# Patient Record
Sex: MALE | Race: WHITE | NOT HISPANIC OR LATINO | Employment: OTHER | URBAN - METROPOLITAN AREA
[De-identification: names, ages, dates, MRNs, and addresses within clinical notes are randomized per-mention and may not be internally consistent; named-entity substitution may affect disease eponyms.]

---

## 2021-04-14 DIAGNOSIS — Z23 ENCOUNTER FOR IMMUNIZATION: ICD-10-CM

## 2022-11-02 ENCOUNTER — APPOINTMENT (EMERGENCY)
Dept: CT IMAGING | Facility: HOSPITAL | Age: 77
End: 2022-11-02

## 2022-11-02 ENCOUNTER — HOSPITAL ENCOUNTER (EMERGENCY)
Facility: HOSPITAL | Age: 77
End: 2022-11-02
Attending: INTERNAL MEDICINE

## 2022-11-02 ENCOUNTER — APPOINTMENT (EMERGENCY)
Dept: RADIOLOGY | Facility: HOSPITAL | Age: 77
End: 2022-11-02

## 2022-11-02 ENCOUNTER — HOSPITAL ENCOUNTER (INPATIENT)
Facility: HOSPITAL | Age: 77
LOS: 7 days | End: 2022-11-09
Attending: SURGERY | Admitting: SURGERY

## 2022-11-02 VITALS
SYSTOLIC BLOOD PRESSURE: 103 MMHG | TEMPERATURE: 97.5 F | HEART RATE: 80 BPM | HEIGHT: 73 IN | WEIGHT: 288 LBS | BODY MASS INDEX: 38.17 KG/M2 | OXYGEN SATURATION: 98 % | DIASTOLIC BLOOD PRESSURE: 55 MMHG | RESPIRATION RATE: 18 BRPM

## 2022-11-02 DIAGNOSIS — S72.353A: ICD-10-CM
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
## 2022-11-02 DIAGNOSIS — M97.9XXA PERIPROSTHETIC FRACTURE OF KNEE: Primary | ICD-10-CM

## 2022-11-02 DIAGNOSIS — R21 RASH: ICD-10-CM

## 2022-11-02 LAB
ABO GROUP BLD: NORMAL
ANION GAP SERPL CALCULATED.3IONS-SCNC: 7 MMOL/L (ref 4–13)
APTT PPP: 25 SECONDS (ref 23–37)
BASOPHILS # BLD AUTO: 0.03 THOUSANDS/ÂΜL (ref 0–0.1)
BASOPHILS NFR BLD AUTO: 0 % (ref 0–1)
BLD GP AB SCN SERPL QL: NEGATIVE
BLD GP AB SCN SERPL QL: NEGATIVE
BUN SERPL-MCNC: 17 MG/DL (ref 5–25)
CALCIUM SERPL-MCNC: 8.9 MG/DL (ref 8.4–10.2)
CHLORIDE SERPL-SCNC: 106 MMOL/L (ref 96–108)
CO2 SERPL-SCNC: 29 MMOL/L (ref 21–32)
CREAT SERPL-MCNC: 1.05 MG/DL (ref 0.6–1.3)
EOSINOPHIL # BLD AUTO: 0.07 THOUSAND/ÂΜL (ref 0–0.61)
EOSINOPHIL NFR BLD AUTO: 1 % (ref 0–6)
ERYTHROCYTE [DISTWIDTH] IN BLOOD BY AUTOMATED COUNT: 13 % (ref 11.6–15.1)
GFR SERPL CREATININE-BSD FRML MDRD: 69 ML/MIN/1.73SQ M
GLUCOSE SERPL-MCNC: 104 MG/DL (ref 65–140)
HCT VFR BLD AUTO: 37.9 % (ref 36.5–49.3)
HGB BLD-MCNC: 12.3 G/DL (ref 12–17)
IMM GRANULOCYTES # BLD AUTO: 0.04 THOUSAND/UL (ref 0–0.2)
IMM GRANULOCYTES NFR BLD AUTO: 0 % (ref 0–2)
INR PPP: 1.01 (ref 0.84–1.19)
LYMPHOCYTES # BLD AUTO: 0.95 THOUSANDS/ÂΜL (ref 0.6–4.47)
LYMPHOCYTES NFR BLD AUTO: 7 % (ref 14–44)
MCH RBC QN AUTO: 30.2 PG (ref 26.8–34.3)
MCHC RBC AUTO-ENTMCNC: 32.5 G/DL (ref 31.4–37.4)
MCV RBC AUTO: 93 FL (ref 82–98)
MONOCYTES # BLD AUTO: 0.65 THOUSAND/ÂΜL (ref 0.17–1.22)
MONOCYTES NFR BLD AUTO: 5 % (ref 4–12)
NEUTROPHILS # BLD AUTO: 11.46 THOUSANDS/ÂΜL (ref 1.85–7.62)
NEUTS SEG NFR BLD AUTO: 87 % (ref 43–75)
NRBC BLD AUTO-RTO: 0 /100 WBCS
PLATELET # BLD AUTO: 215 THOUSANDS/UL (ref 149–390)
PLATELET # BLD AUTO: 225 THOUSANDS/UL (ref 149–390)
PMV BLD AUTO: 8.9 FL (ref 8.9–12.7)
PMV BLD AUTO: 9 FL (ref 8.9–12.7)
POTASSIUM SERPL-SCNC: 4.1 MMOL/L (ref 3.5–5.3)
PROTHROMBIN TIME: 13.3 SECONDS (ref 11.6–14.5)
RBC # BLD AUTO: 4.07 MILLION/UL (ref 3.88–5.62)
RH BLD: POSITIVE
SODIUM SERPL-SCNC: 142 MMOL/L (ref 135–147)
SPECIMEN EXPIRATION DATE: NORMAL
SPECIMEN EXPIRATION DATE: NORMAL
WBC # BLD AUTO: 13.2 THOUSAND/UL (ref 4.31–10.16)

## 2022-11-02 RX ORDER — ONDANSETRON 2 MG/ML
4 INJECTION INTRAMUSCULAR; INTRAVENOUS EVERY 6 HOURS PRN
Status: DISCONTINUED | OUTPATIENT
Start: 2022-11-02 | End: 2022-11-09 | Stop reason: HOSPADM

## 2022-11-02 RX ORDER — ENOXAPARIN SODIUM 100 MG/ML
30 INJECTION SUBCUTANEOUS EVERY 12 HOURS
Status: DISCONTINUED | OUTPATIENT
Start: 2022-11-02 | End: 2022-11-02

## 2022-11-02 RX ORDER — ATORVASTATIN CALCIUM 40 MG/1
40 TABLET, FILM COATED ORAL
Status: DISCONTINUED | OUTPATIENT
Start: 2022-11-03 | End: 2022-11-09 | Stop reason: HOSPADM

## 2022-11-02 RX ORDER — OXYCODONE HYDROCHLORIDE 5 MG/1
2.5 TABLET ORAL EVERY 4 HOURS PRN
Status: DISCONTINUED | OUTPATIENT
Start: 2022-11-02 | End: 2022-11-09 | Stop reason: HOSPADM

## 2022-11-02 RX ORDER — SODIUM CHLORIDE, SODIUM LACTATE, POTASSIUM CHLORIDE, CALCIUM CHLORIDE 600; 310; 30; 20 MG/100ML; MG/100ML; MG/100ML; MG/100ML
75 INJECTION, SOLUTION INTRAVENOUS CONTINUOUS
Status: DISCONTINUED | OUTPATIENT
Start: 2022-11-02 | End: 2022-11-07

## 2022-11-02 RX ORDER — HYDROMORPHONE HCL/PF 1 MG/ML
0.5 SYRINGE (ML) INJECTION ONCE
Status: COMPLETED | OUTPATIENT
Start: 2022-11-02 | End: 2022-11-02

## 2022-11-02 RX ORDER — FENTANYL CITRATE 50 UG/ML
50 INJECTION, SOLUTION INTRAMUSCULAR; INTRAVENOUS ONCE
Status: COMPLETED | OUTPATIENT
Start: 2022-11-02 | End: 2022-11-02

## 2022-11-02 RX ORDER — DOCUSATE SODIUM 100 MG/1
100 CAPSULE, LIQUID FILLED ORAL 2 TIMES DAILY
Status: DISCONTINUED | OUTPATIENT
Start: 2022-11-02 | End: 2022-11-09 | Stop reason: HOSPADM

## 2022-11-02 RX ORDER — ROPINIROLE 1 MG/1
1 TABLET, FILM COATED ORAL
Status: DISCONTINUED | OUTPATIENT
Start: 2022-11-02 | End: 2022-11-09 | Stop reason: HOSPADM

## 2022-11-02 RX ORDER — ACETAMINOPHEN 325 MG/1
975 TABLET ORAL EVERY 8 HOURS SCHEDULED
Status: DISCONTINUED | OUTPATIENT
Start: 2022-11-02 | End: 2022-11-09 | Stop reason: HOSPADM

## 2022-11-02 RX ORDER — TAMSULOSIN HYDROCHLORIDE 0.4 MG/1
0.4 CAPSULE ORAL
Status: DISCONTINUED | OUTPATIENT
Start: 2022-11-03 | End: 2022-11-09 | Stop reason: HOSPADM

## 2022-11-02 RX ORDER — OXYCODONE HYDROCHLORIDE 5 MG/1
5 TABLET ORAL EVERY 4 HOURS PRN
Status: DISCONTINUED | OUTPATIENT
Start: 2022-11-02 | End: 2022-11-09 | Stop reason: HOSPADM

## 2022-11-02 RX ORDER — ASPIRIN 81 MG/1
81 TABLET ORAL DAILY
Status: DISCONTINUED | OUTPATIENT
Start: 2022-11-03 | End: 2022-11-09 | Stop reason: HOSPADM

## 2022-11-02 RX ORDER — EZETIMIBE 10 MG/1
10 TABLET ORAL
Status: DISCONTINUED | OUTPATIENT
Start: 2022-11-02 | End: 2022-11-09 | Stop reason: HOSPADM

## 2022-11-02 RX ORDER — ENOXAPARIN SODIUM 100 MG/ML
30 INJECTION SUBCUTANEOUS EVERY 12 HOURS SCHEDULED
Status: DISCONTINUED | OUTPATIENT
Start: 2022-11-02 | End: 2022-11-09 | Stop reason: HOSPADM

## 2022-11-02 RX ADMIN — IOHEXOL 100 ML: 350 INJECTION, SOLUTION INTRAVENOUS at 15:19

## 2022-11-02 RX ADMIN — ENOXAPARIN SODIUM 30 MG: 30 INJECTION SUBCUTANEOUS at 23:04

## 2022-11-02 RX ADMIN — HYDROMORPHONE HYDROCHLORIDE 0.5 MG: 1 INJECTION, SOLUTION INTRAMUSCULAR; INTRAVENOUS; SUBCUTANEOUS at 20:12

## 2022-11-02 RX ADMIN — EZETIMIBE 10 MG: 10 TABLET ORAL at 23:05

## 2022-11-02 RX ADMIN — HYDROMORPHONE HYDROCHLORIDE 0.5 MG: 1 INJECTION, SOLUTION INTRAMUSCULAR; INTRAVENOUS; SUBCUTANEOUS at 18:10

## 2022-11-02 RX ADMIN — FENTANYL CITRATE 50 MCG: 50 INJECTION, SOLUTION INTRAMUSCULAR; INTRAVENOUS at 14:41

## 2022-11-02 RX ADMIN — ACETAMINOPHEN 975 MG: 325 TABLET ORAL at 23:05

## 2022-11-02 RX ADMIN — Medication 12.5 MG: at 23:05

## 2022-11-02 RX ADMIN — ROPINIROLE HYDROCHLORIDE 1 MG: 1 TABLET, FILM COATED ORAL at 23:05

## 2022-11-02 RX ADMIN — FENTANYL CITRATE 50 MCG: 50 INJECTION, SOLUTION INTRAMUSCULAR; INTRAVENOUS at 16:32

## 2022-11-02 RX ADMIN — SODIUM CHLORIDE, SODIUM LACTATE, POTASSIUM CHLORIDE, AND CALCIUM CHLORIDE 75 ML/HR: .6; .31; .03; .02 INJECTION, SOLUTION INTRAVENOUS at 22:59

## 2022-11-02 NOTE — ED NOTES
Pt transported to [facility]0 [facility] lucian Carcamo RN  11/02/22 5213

## 2022-11-02 NOTE — EMTALA/ACUTE CARE TRANSFER
[address] [place] [id number]  Dept: [phone]      EMTALA TRANSFER CONSENT    NAME Ronan Fallon                                         1945                              MRN [id number]    I have been informed of my rights regarding examination, treatment, and transfer   by Dr Azael Krause DO    Benefits: Specialized equipment and/or services available at the receiving facility (Include comment)________________________ Asiya Gallegos)    Risks: Potential for delay in receiving treatment, Potential deterioration of medical condition, Loss of IV, Increased discomfort during transfer, Possible worsening of condition or death during transfer      Consent for Transfer:  I acknowledge that my medical condition has been evaluated and explained to me by the emergency department physician or other qualified medical person and/or my attending physician, who has recommended that I be transferred to the service of  Accepting Physician: Dr Andie Gan at [facility] Name, [address] : [facility]  The above potential benefits of such transfer, the potential risks associated with such transfer, and the probable risks of not being transferred have been explained to me, and I fully understand them  The doctor has explained that, in my case, the benefits of transfer outweigh the risks  I agree to be transferred  I authorize the performance of emergency medical procedures and treatments upon me in both transit and upon arrival at the receiving facility  Additionally, I authorize the release of any and all medical records to the receiving facility and request they be transported with me, if possible  I understand that the safest mode of transportation during a medical emergency is an ambulance and that the Hospital advocates the use of this mode of transport   Risks of traveling to the receiving facility by car, including absence of medical control, life sustaining equipment, such as oxygen, and medical personnel has been explained to me and I fully understand them  (RANDI CORRECT BOX BELOW)  [  ]  I consent to the stated transfer and to be transported by ambulance/helicopter  [  ]  I consent to the stated transfer, but refuse transportation by ambulance and accept full responsibility for my transportation by car  I understand the risks of non-ambulance transfers and I exonerate the Hospital and its staff from any deterioration in my condition that results from this refusal     X___________________________________________    DATE  22  TIME________  Signature of patient or legally responsible individual signing on patient behalf           RELATIONSHIP TO PATIENT_________________________          Provider Certification    NAME Fede Ritchie                                         1945                              MRN [id number]    A medical screening exam was performed on the above named patient  Based on the examination:    Condition Necessitating Transfer The primary encounter diagnosis was Fall, initial encounter  A diagnosis of Comminuted fracture of shaft of femur ([address] ) was also pertinent to this visit      Patient Condition: The patient has been stabilized such that within reasonable medical probability, no material deterioration of the patient condition or the condition of the unborn child(nirali) is likely to result from the transfer    Reason for Transfer: Level of Care needed not available at this facility    Transfer Requirements: Facility L[facility] of hospitals in the [place]   · Space available and qualified personnel available for treatment as acknowledged by    · Agreed to accept transfer and to provide appropriate medical treatment as acknowledged by       Dr Levorn Cooks  · Appropriate medical records of the examination and treatment of the patient are provided at the time of transfer   [address] _______  · Transfer will be performed by qualified personnel from    and appropriate transfer equipment as required, including the use of necessary and appropriate life support measures  Provider Certification: I have examined the patient and explained the following risks and benefits of being transferred/refusing transfer to the patient/family:  General risk, such as traffic hazards, adverse weather conditions, rough terrain or turbulence, possible failure of equipment (including vehicle or aircraft), or consequences of actions of persons outside the control of the transport personnel      Based on these reasonable risks and benefits to the patient and/or the unborn child(nirali), and based upon the information available at the time of the patient’s examination, I certify that the medical benefits reasonably to be expected from the provision of appropriate medical treatments at another medical facility outweigh the increasing risks, if any, to the individual’s medical condition, and in the case of labor to the unborn child, from effecting the transfer      X____________________________________________ DATE 11/02/22        TIME_______      ORIGINAL - SEND TO MEDICAL RECORDS   COPY - SEND WITH PATIENT DURING TRANSFER

## 2022-11-02 NOTE — ED PROVIDER NOTES
Emergency Department Trauma Note  Mary Youngblood 77 y o  male MRN: [id number]  Unit/Bed#: ED 12/ED 12 Encounter: [id number]      Trauma Alert: Trauma Acuity: Trauma Evaluation  Model of Arrival: Mode of Arrival: ALS via    Trauma Team: Current Providers  Attending Provider: Kristine Taylor DO  Physician Assistant: David Gale PA-C  Registered Nurse: Timothy Seth RN  Consultants:     None      History of Present Illness     Chief Complaint:   Chief Complaint   Patient presents with   • Fall     Patient was fishing on an embankment and fell down it injuring his right leg        HPI:  aMry Youngblood is a 77 y o  male who presents with R leg pain and deformity after fall  Mechanism:Details of Incident: pt was fishing and fell down an embankment backwards  Injury Date: 11/02/22 Injury Time: 1330 Injury [address]: carbon    78-year-old male presents to the emergency department via EMS with concern for a fall down a [place]  Patient was reportedly a fishing when he slipped and fell  Patient is reported to have had an obvious deformity of the right lower extremity  Prior history of knee replacement  Patient states he feels grinding in the leg  Concern for fracture in the area of the patient's knee  No gross deformity with splinting applied by EMS  Patient also complains of neck pain chest pain and back pain  No specific abdominal pain  No gross evidence of trauma on the patient other than splinting on right leg  He is awake alert oriented  No reported loss consciousness  He does take daily baby aspirin  Patient states that it strike his head during the fall  Allergies reviewed        Review of Systems   HENT: Negative for dental problem, facial swelling, hearing loss and tinnitus  Eyes: Negative for pain and visual disturbance  Respiratory: Positive for chest tightness  Negative for shortness of breath  Cardiovascular: Negative for chest pain  Gastrointestinal: Negative for abdominal pain  Musculoskeletal: Positive for arthralgias, joint swelling, neck pain and neck stiffness  Negative for back pain  Neurological: Negative for dizziness and headaches  All other systems reviewed and are negative  Historical Information     Immunizations: There is no immunization history on file for this patient  No past medical history on file  No family history on file  No past surgical history on file  No existing history information found  No existing history information found  Family History: non-contributory    Meds/Allergies   None       Not on File    PHYSICAL EXAM    PE limited by: none    Objective   Vitals:   First set: Temperature: 97 5 °F (36 4 °C) (11/02/22 1420)  Pulse: 103 (11/02/22 1420)  Respirations: 18 (11/02/22 1420)  Blood Pressure: 98/56 (11/02/22 1420)  SpO2: 94 % (11/02/22 1420)    Primary Survey:   (A) Airway: patent  (B) Breathing: cta b/l  (C) Circulation: Pulses:   normal  (D) Disabliity:  GCS Total:  15  (E) Expose:  Completed    Secondary Survey: (Click on Physical Exam tab above)  Physical Exam  Vitals and nursing note reviewed  Constitutional:       General: He is not in acute distress  Appearance: He is well-developed  He is not ill-appearing  HENT:      Head: Normocephalic and atraumatic  Right Ear: External ear normal       Left Ear: External ear normal       Nose: Nose normal    Eyes:      Pupils: Pupils are equal, round, and reactive to light  Neck:      Comments: Unable to clear with nexus criteria  Cardiovascular:      Rate and Rhythm: Normal rate and regular rhythm  Heart sounds: Normal heart sounds  No murmur heard  No friction rub  No gallop  Pulmonary:      Effort: Pulmonary effort is normal  No respiratory distress  Breath sounds: Normal breath sounds  No stridor  No wheezing or rales  Chest:      Comments: Nonspecific generalized chest tenderness    Abdominal: General: Bowel sounds are normal  There is no distension  Palpations: Abdomen is soft  Tenderness: There is no abdominal tenderness  There is no guarding  Comments: Fast negative   Musculoskeletal:         General: No tenderness  Normal range of motion  Cervical back: Neck supple  Comments: Deformity and swelling to the area the patient's right knee  Tenderness to left knee  No deformity   Skin:     General: Skin is warm  Capillary Refill: Capillary refill takes less than 2 seconds  Neurological:      Mental Status: He is alert and oriented to person, place, and time  Psychiatric:         Behavior: Behavior is cooperative  Cervical spine cleared by clinical criteria?  No (imaging required)      Invasive Devices  Report    Peripheral Intravenous Line  Duration           Peripheral IV 11/02/22 Distal;Right;Upper;Ventral (anterior) Arm <1 day                Lab Results:   Results Reviewed     Procedure Component Value Units Date/Time    Basic metabolic panel [[id number]] Collected: 11/02/22 1444    Lab Status: Final result Specimen: Blood from Arm, Right Updated: 11/02/22 1510     Sodium 142 mmol/L      Potassium 4 1 mmol/L      Chloride 106 mmol/L      CO2 29 mmol/L      ANION GAP 7 mmol/L      BUN 17 mg/dL      Creatinine 1 05 mg/dL      Glucose 104 mg/dL      Calcium 8 9 mg/dL      eGFR 69 ml/min/1 73sq m     Narrative:      [place] guidelines for Chronic Kidney Disease (CKD):   •  Stage 1 with normal or high GFR (GFR > 90 mL/min/1 73 square meters)  •  Stage 2 Mild CKD (GFR = 60-89 mL/min/1 73 square meters)  •  Stage 3A Moderate CKD (GFR = 45-59 mL/min/1 73 square meters)  •  Stage 3B Moderate CKD (GFR = 30-44 mL/min/1 73 square meters)  •  Stage 4 Severe CKD (GFR = 15-29 mL/min/1 73 square meters)  •  Stage 5 End Stage CKD (GFR <15 mL/min/1 73 square meters)  Note: GFR calculation is accurate only with a steady state creatinine    Protime-INR [[id number]]  (Normal) Collected: 11/02/22 1444    Lab Status: Final result Specimen: Blood from Arm, Right Updated: 11/02/22 1501     Protime 13 3 seconds      INR 1 01    APTT [[id number]]  (Normal) Collected: 11/02/22 1444    Lab Status: Final result Specimen: Blood from Arm, Right Updated: 11/02/22 1501     PTT 25 seconds     CBC and differential [[id number]]  (Abnormal) Collected: 11/02/22 1444    Lab Status: Final result Specimen: Blood from Arm, Right Updated: 11/02/22 1449     WBC 13 20 Thousand/uL      RBC 4 07 Million/uL      Hemoglobin 12 3 g/dL      Hematocrit 37 9 %      MCV 93 fL      MCH 30 2 pg      MCHC 32 5 g/dL      RDW 13 0 %      MPV 9 0 fL      Platelets 601 Thousands/uL      nRBC 0 /100 WBCs      Neutrophils Relative 87 %      Immat GRANS % 0 %      Lymphocytes Relative 7 %      Monocytes Relative 5 %      Eosinophils Relative 1 %      Basophils Relative 0 %      Neutrophils Absolute 11 46 Thousands/µL      Immature Grans Absolute 0 04 Thousand/uL      Lymphocytes Absolute 0 95 Thousands/µL      Monocytes Absolute 0 65 Thousand/µL      Eosinophils Absolute 0 07 Thousand/µL      Basophils Absolute 0 03 Thousands/µL                  Imaging Studies:   Direct to CT: No  CT lower extremity wo contrast right   Final Result by Candelario Samuel MD (11/02 1630)      Acute comminuted distal femoral periprosthetic fracture, as described  Workstation performed: [id number]         XR knee 4+ vw right injury   Final Result by Candelario Samuel MD (11/02 1636)      Acute comminuted distal femoral periprosthetic fracture  No acute osseous abnormality in the proximal femur, tibia-fibula, or ankle  Workstation performed: [id number]         XR femur 2 views RIGHT   Final Result by Candelario Samuel MD (11/02 1636)      Acute comminuted distal femoral periprosthetic fracture  No acute osseous abnormality in the proximal femur, tibia-fibula, or ankle        Workstation performed: [id number] XR tibia fibula 2 views RIGHT   Final Result by Rey Covington MD (11/02 1636)      Acute comminuted distal femoral periprosthetic fracture  No acute osseous abnormality in the proximal femur, tibia-fibula, or ankle  Workstation performed: [id number]         XR ankle 3+ views RIGHT   Final Result by Rey Covington MD (11/02 1636)      Acute comminuted distal femoral periprosthetic fracture  No acute osseous abnormality in the proximal femur, tibia-fibula, or ankle  Workstation performed: [id number]         TRAUMA - CT chest abdomen pelvis w contrast   Final Result by Rey Covington MD (11/02 1556)      No acute traumatic injury in the chest, abdomen, or pelvis  The study was marked in [facility] for immediate notification  Workstation performed: [id number]         TRAUMA - CT spine cervical wo contrast   Final Result by Rey Covington MD (11/02 1538)      No cervical spine fracture or traumatic malalignment  The study was marked in [facility] for immediate notification  Workstation performed: [id number]         TRAUMA - CT head wo contrast   Final Result by Rey Covington MD (11/02 1528)      No acute intracranial abnormality  The study was marked in [facility] for immediate notification  Workstation performed: [id number]         XR Trauma chest portable   Final Result by London Amato MD (11/02 1651)      No acute cardiopulmonary disease  Workstation performed: [id number]         XR Trauma pelvis ap only 1 or 2 vw   Final Result by London Amato MD (11/02 1652)      No acute osseous abnormality  Degenerative changes as described  Workstation performed: [id number]               Procedures  Procedures         ED Course  ED Course as of 11/02/22 1929 Wed Nov 02, 2022   1629 Patient with complicated distal femur periprosthetic fracture  Requesting transfer to [place]     12 Pt willing to stay in network to fix fracture  1732 Patient to be transferred to [facility] for management complex femur fracture  MDM  Number of Diagnoses or Management Options  Comminuted fracture of shaft of femur ([facility] [address] ): new and requires workup  Fall, initial encounter: new and requires workup  Diagnosis management comments: Patient has a periprosthetic right femoral fracture adjacent to the patient's prior right knee arthroplasty  Patient to require transfer to higher level of care for further management  Patient is agreeable this plan  Amount and/or Complexity of Data Reviewed  Clinical lab tests: ordered and reviewed  Tests in the radiology section of CPT®: ordered and reviewed    Risk of Complications, Morbidity, and/or Mortality  Presenting problems: moderate  Diagnostic procedures: low  Management options: low    Patient Progress  Patient progress: stable          Disposition  [facility] Transfer: No  Final diagnoses:   Fall, initial encounter   Comminuted fracture of shaft of femur ([facility] [address] )     Time reflects when diagnosis was documented in both MDM as applicable and the Disposition within this note     Time User Action Codes Description Comment    11/2/2022  5:33 PM Elvie Ambriz Add [Q52  FUYL] Fall, initial encounter     11/2/2022  5:33 PM Elvie Ambriz Add [S72 353A] Comminuted fracture of shaft of femur [facility])       ED Disposition     ED Disposition   Transfer to Another Facility-In Network    Condition   --    Date/Time   Wed Nov 2, 2022  5:33 PM    Comment   Fede  should be transferred out to B             MD Documentation    Dennis Wharton Most Recent Value   Patient Condition The patient has been stabilized such that within reasonable medical probability, no material deterioration of the patient condition or the condition of the unborn child(nirali) is likely to result from the transfer   Reason for Transfer Level of Care needed not available at this facility   Benefits of Transfer Specialized equipment and/or services available at the receiving facility (Include comment)________________________  [Trauma/Ortho]   Risks of Transfer Potential for delay in receiving treatment, Potential deterioration of medical condition, Loss of IV, Increased discomfort during transfer, Possible worsening of condition or death during transfer   Accepting Physician Dr Yeni Ayala Name, Ashli Castillo PA-C   Provider Certification General risk, such as traffic hazards, adverse weather conditions, rough terrain or turbulence, possible failure of equipment (including vehicle or aircraft), or consequences of actions of persons outside the control of the transport personnel      RN Documentation    [address] Name, [address]  [facility]   Transport Mode Ambulance      Follow-up Information    None       Patient's Medications    No medications on file     No discharge procedures on file      PDMP Review     None          ED Provider  Electronically Signed by         Citlali Gonzales PA-C  11/02/22 7044

## 2022-11-03 ENCOUNTER — APPOINTMENT (INPATIENT)
Dept: RADIOLOGY | Facility: HOSPITAL | Age: 77
End: 2022-11-03

## 2022-11-03 ENCOUNTER — ANESTHESIA (INPATIENT)
Dept: PERIOP | Facility: HOSPITAL | Age: 77
End: 2022-11-03

## 2022-11-03 ENCOUNTER — ANESTHESIA EVENT (INPATIENT)
Dept: PERIOP | Facility: HOSPITAL | Age: 77
End: 2022-11-03

## 2022-11-03 LAB
ATRIAL RATE: 101 BPM
ATRIAL RATE: 80 BPM
BASOPHILS # BLD MANUAL: 0 THOUSAND/UL (ref 0–0.1)
BASOPHILS NFR MAR MANUAL: 0 % (ref 0–1)
EOSINOPHIL # BLD MANUAL: 0 THOUSAND/UL (ref 0–0.4)
EOSINOPHIL NFR BLD MANUAL: 0 % (ref 0–6)
ERYTHROCYTE [DISTWIDTH] IN BLOOD BY AUTOMATED COUNT: 13.2 % (ref 11.6–15.1)
GIANT PLATELETS BLD QL SMEAR: PRESENT
HCT VFR BLD AUTO: 28.4 % (ref 36.5–49.3)
HGB BLD-MCNC: 9.1 G/DL (ref 12–17)
LYMPHOCYTES # BLD AUTO: 0.08 THOUSAND/UL (ref 0.6–4.47)
LYMPHOCYTES # BLD AUTO: 1 % (ref 14–44)
MCH RBC QN AUTO: 30.1 PG (ref 26.8–34.3)
MCHC RBC AUTO-ENTMCNC: 32 G/DL (ref 31.4–37.4)
MCV RBC AUTO: 94 FL (ref 82–98)
MONOCYTES # BLD AUTO: 0.08 THOUSAND/UL (ref 0–1.22)
MONOCYTES NFR BLD: 1 % (ref 4–12)
NEUTROPHILS # BLD MANUAL: 8.26 THOUSAND/UL (ref 1.85–7.62)
NEUTS BAND NFR BLD MANUAL: 7 % (ref 0–8)
NEUTS SEG NFR BLD AUTO: 91 % (ref 43–75)
P AXIS: 37 DEGREES
P AXIS: 43 DEGREES
PLATELET # BLD AUTO: 164 THOUSANDS/UL (ref 149–390)
PLATELET BLD QL SMEAR: ADEQUATE
PMV BLD AUTO: 9 FL (ref 8.9–12.7)
POLYCHROMASIA BLD QL SMEAR: PRESENT
PR INTERVAL: 164 MS
PR INTERVAL: 166 MS
QRS AXIS: 45 DEGREES
QRS AXIS: 56 DEGREES
QRSD INTERVAL: 92 MS
QRSD INTERVAL: 92 MS
QT INTERVAL: 348 MS
QT INTERVAL: 386 MS
QTC INTERVAL: 445 MS
QTC INTERVAL: 451 MS
RBC # BLD AUTO: 3.02 MILLION/UL (ref 3.88–5.62)
RBC MORPH BLD: PRESENT
T WAVE AXIS: 73 DEGREES
T WAVE AXIS: 74 DEGREES
VENTRICULAR RATE: 101 BPM
VENTRICULAR RATE: 80 BPM
WBC # BLD AUTO: 8.43 THOUSAND/UL (ref 4.31–10.16)

## 2022-11-03 PROCEDURE — 0QSB04Z REPOSITION RIGHT LOWER FEMUR WITH INTERNAL FIXATION DEVICE, OPEN APPROACH: ICD-10-PCS | Performed by: ORTHOPAEDIC SURGERY

## 2022-11-03 DEVICE — 5.0MM CANNULATED CONICAL SCREW 95MM: Type: IMPLANTABLE DEVICE | Site: KNEE | Status: FUNCTIONAL

## 2022-11-03 DEVICE — 5.0MM CANNULATED LOCKING SCREW 35MM: Type: IMPLANTABLE DEVICE | Site: KNEE | Status: FUNCTIONAL

## 2022-11-03 DEVICE — 4.5MM CORTEX SCREW SELF-TAPPING 48MM: Type: IMPLANTABLE DEVICE | Site: KNEE | Status: FUNCTIONAL

## 2022-11-03 DEVICE — 4.5MM CORTEX SCREW SELF-TAPPING 60MM: Type: IMPLANTABLE DEVICE | Site: KNEE | Status: FUNCTIONAL

## 2022-11-03 DEVICE — 4.5MM VA-LCP CURVED CONDYLAR PLATE/10 HOLE/230MM/RIGHT
Type: IMPLANTABLE DEVICE | Site: KNEE | Status: FUNCTIONAL
Brand: VA-LCP

## 2022-11-03 DEVICE — 5.0MM CANNULATED LOCKING SCREW 95MM: Type: IMPLANTABLE DEVICE | Site: KNEE | Status: FUNCTIONAL

## 2022-11-03 DEVICE — 5.0MM CANNULATED LOCKING SCREW 80MM: Type: IMPLANTABLE DEVICE | Site: KNEE | Status: FUNCTIONAL

## 2022-11-03 DEVICE — 4.5MM CORTEX SCREW SELF-TAPPING 80MM: Type: IMPLANTABLE DEVICE | Site: KNEE | Status: FUNCTIONAL

## 2022-11-03 RX ORDER — PROPOFOL 10 MG/ML
INJECTION, EMULSION INTRAVENOUS AS NEEDED
Status: DISCONTINUED | OUTPATIENT
Start: 2022-11-03 | End: 2022-11-03

## 2022-11-03 RX ORDER — DEXAMETHASONE SODIUM PHOSPHATE 4 MG/ML
INJECTION, SOLUTION INTRA-ARTICULAR; INTRALESIONAL; INTRAMUSCULAR; INTRAVENOUS; SOFT TISSUE AS NEEDED
Status: DISCONTINUED | OUTPATIENT
Start: 2022-11-03 | End: 2022-11-03

## 2022-11-03 RX ORDER — EPHEDRINE SULFATE 50 MG/ML
INJECTION INTRAVENOUS AS NEEDED
Status: DISCONTINUED | OUTPATIENT
Start: 2022-11-03 | End: 2022-11-03

## 2022-11-03 RX ORDER — ONDANSETRON 2 MG/ML
INJECTION INTRAMUSCULAR; INTRAVENOUS AS NEEDED
Status: DISCONTINUED | OUTPATIENT
Start: 2022-11-03 | End: 2022-11-03

## 2022-11-03 RX ORDER — FENTANYL CITRATE 50 UG/ML
INJECTION, SOLUTION INTRAMUSCULAR; INTRAVENOUS AS NEEDED
Status: DISCONTINUED | OUTPATIENT
Start: 2022-11-03 | End: 2022-11-03

## 2022-11-03 RX ORDER — DEXAMETHASONE SODIUM PHOSPHATE 10 MG/ML
INJECTION, SOLUTION INTRAMUSCULAR; INTRAVENOUS AS NEEDED
Status: DISCONTINUED | OUTPATIENT
Start: 2022-11-03 | End: 2022-11-03

## 2022-11-03 RX ORDER — ROCURONIUM BROMIDE 10 MG/ML
INJECTION, SOLUTION INTRAVENOUS AS NEEDED
Status: DISCONTINUED | OUTPATIENT
Start: 2022-11-03 | End: 2022-11-03

## 2022-11-03 RX ORDER — ENOXAPARIN SODIUM 100 MG/ML
40 INJECTION SUBCUTANEOUS
Qty: 11.2 ML | Refills: 0 | Status: SHIPPED | OUTPATIENT
Start: 2022-11-03 | End: 2022-12-01

## 2022-11-03 RX ORDER — SODIUM CHLORIDE, SODIUM LACTATE, POTASSIUM CHLORIDE, CALCIUM CHLORIDE 600; 310; 30; 20 MG/100ML; MG/100ML; MG/100ML; MG/100ML
INJECTION, SOLUTION INTRAVENOUS CONTINUOUS PRN
Status: DISCONTINUED | OUTPATIENT
Start: 2022-11-03 | End: 2022-11-03

## 2022-11-03 RX ORDER — LIDOCAINE HYDROCHLORIDE 10 MG/ML
INJECTION, SOLUTION EPIDURAL; INFILTRATION; INTRACAUDAL; PERINEURAL AS NEEDED
Status: DISCONTINUED | OUTPATIENT
Start: 2022-11-03 | End: 2022-11-03

## 2022-11-03 RX ORDER — CEFAZOLIN SODIUM 2 G/50ML
2000 SOLUTION INTRAVENOUS
Status: COMPLETED | OUTPATIENT
Start: 2022-11-03 | End: 2022-11-03

## 2022-11-03 RX ORDER — BUPIVACAINE HYDROCHLORIDE 5 MG/ML
INJECTION, SOLUTION EPIDURAL; INTRACAUDAL AS NEEDED
Status: DISCONTINUED | OUTPATIENT
Start: 2022-11-03 | End: 2022-11-03

## 2022-11-03 RX ORDER — ONDANSETRON 2 MG/ML
4 INJECTION INTRAMUSCULAR; INTRAVENOUS ONCE AS NEEDED
Status: DISCONTINUED | OUTPATIENT
Start: 2022-11-03 | End: 2022-11-03 | Stop reason: HOSPADM

## 2022-11-03 RX ORDER — OXYCODONE HYDROCHLORIDE 5 MG/1
TABLET ORAL
Qty: 30 TABLET | Refills: 0 | Status: SHIPPED | OUTPATIENT
Start: 2022-11-03 | End: 2022-11-08

## 2022-11-03 RX ORDER — CEFAZOLIN SODIUM 2 G/50ML
2000 SOLUTION INTRAVENOUS EVERY 8 HOURS
Status: COMPLETED | OUTPATIENT
Start: 2022-11-03 | End: 2022-11-04

## 2022-11-03 RX ORDER — TRANEXAMIC ACID 100 MG/ML
INJECTION, SOLUTION INTRAVENOUS AS NEEDED
Status: DISCONTINUED | OUTPATIENT
Start: 2022-11-03 | End: 2022-11-03

## 2022-11-03 RX ORDER — SUCCINYLCHOLINE/SOD CL,ISO/PF 100 MG/5ML
SYRINGE (ML) INTRAVENOUS AS NEEDED
Status: DISCONTINUED | OUTPATIENT
Start: 2022-11-03 | End: 2022-11-03

## 2022-11-03 RX ORDER — MAGNESIUM HYDROXIDE 1200 MG/15ML
LIQUID ORAL AS NEEDED
Status: DISCONTINUED | OUTPATIENT
Start: 2022-11-03 | End: 2022-11-03 | Stop reason: HOSPADM

## 2022-11-03 RX ORDER — HYDROMORPHONE HCL/PF 1 MG/ML
0.5 SYRINGE (ML) INJECTION
Status: DISCONTINUED | OUTPATIENT
Start: 2022-11-03 | End: 2022-11-03 | Stop reason: HOSPADM

## 2022-11-03 RX ORDER — SODIUM CHLORIDE 9 MG/ML
INJECTION, SOLUTION INTRAVENOUS CONTINUOUS PRN
Status: DISCONTINUED | OUTPATIENT
Start: 2022-11-03 | End: 2022-11-03

## 2022-11-03 RX ADMIN — Medication 1 TABLET: at 08:54

## 2022-11-03 RX ADMIN — ENOXAPARIN SODIUM 30 MG: 30 INJECTION SUBCUTANEOUS at 21:43

## 2022-11-03 RX ADMIN — DEXAMETHASONE SODIUM PHOSPHATE 10 MG: 10 INJECTION, SOLUTION INTRAMUSCULAR; INTRAVENOUS at 13:25

## 2022-11-03 RX ADMIN — Medication 100 MG: at 12:04

## 2022-11-03 RX ADMIN — CEFAZOLIN SODIUM 2000 MG: 2 SOLUTION INTRAVENOUS at 12:01

## 2022-11-03 RX ADMIN — HYDROMORPHONE HYDROCHLORIDE 0.5 MG: 1 INJECTION, SOLUTION INTRAMUSCULAR; INTRAVENOUS; SUBCUTANEOUS at 15:09

## 2022-11-03 RX ADMIN — SODIUM CHLORIDE, SODIUM LACTATE, POTASSIUM CHLORIDE, AND CALCIUM CHLORIDE: .6; .31; .03; .02 INJECTION, SOLUTION INTRAVENOUS at 11:59

## 2022-11-03 RX ADMIN — ACETAMINOPHEN 975 MG: 325 TABLET ORAL at 21:44

## 2022-11-03 RX ADMIN — ONDANSETRON 4 MG: 2 INJECTION INTRAMUSCULAR; INTRAVENOUS at 13:25

## 2022-11-03 RX ADMIN — ROCURONIUM BROMIDE 40 MG: 50 INJECTION INTRAVENOUS at 12:14

## 2022-11-03 RX ADMIN — SODIUM CHLORIDE: 9 INJECTION, SOLUTION INTRAVENOUS at 12:13

## 2022-11-03 RX ADMIN — ATORVASTATIN CALCIUM 40 MG: 40 TABLET, FILM COATED ORAL at 17:09

## 2022-11-03 RX ADMIN — ROCURONIUM BROMIDE 10 MG: 50 INJECTION INTRAVENOUS at 12:04

## 2022-11-03 RX ADMIN — TAMSULOSIN HYDROCHLORIDE 0.4 MG: 0.4 CAPSULE ORAL at 17:09

## 2022-11-03 RX ADMIN — EZETIMIBE 10 MG: 10 TABLET ORAL at 21:44

## 2022-11-03 RX ADMIN — SODIUM CHLORIDE, SODIUM LACTATE, POTASSIUM CHLORIDE, AND CALCIUM CHLORIDE 75 ML/HR: .6; .31; .03; .02 INJECTION, SOLUTION INTRAVENOUS at 15:15

## 2022-11-03 RX ADMIN — DOCUSATE SODIUM 100 MG: 100 CAPSULE, LIQUID FILLED ORAL at 08:55

## 2022-11-03 RX ADMIN — LIDOCAINE HYDROCHLORIDE 50 MG: 10 INJECTION, SOLUTION EPIDURAL; INFILTRATION; INTRACAUDAL; PERINEURAL at 12:04

## 2022-11-03 RX ADMIN — ROPINIROLE HYDROCHLORIDE 1 MG: 1 TABLET, FILM COATED ORAL at 21:44

## 2022-11-03 RX ADMIN — PROPOFOL 200 MG: 10 INJECTION, EMULSION INTRAVENOUS at 12:04

## 2022-11-03 RX ADMIN — BUPIVACAINE HYDROCHLORIDE 30 ML: 5 INJECTION, SOLUTION EPIDURAL; INTRACAUDAL; PERINEURAL at 11:40

## 2022-11-03 RX ADMIN — DEXAMETHASONE SODIUM PHOSPHATE 4 MG: 4 INJECTION INTRA-ARTICULAR; INTRALESIONAL; INTRAMUSCULAR; INTRAVENOUS; SOFT TISSUE at 11:40

## 2022-11-03 RX ADMIN — EPHEDRINE SULFATE 10 MG: 50 INJECTION INTRAVENOUS at 12:12

## 2022-11-03 RX ADMIN — HYDROMORPHONE HYDROCHLORIDE 0.5 MG: 1 INJECTION, SOLUTION INTRAMUSCULAR; INTRAVENOUS; SUBCUTANEOUS at 14:34

## 2022-11-03 RX ADMIN — OXYCODONE HYDROCHLORIDE 5 MG: 5 TABLET ORAL at 09:04

## 2022-11-03 RX ADMIN — FENTANYL CITRATE 50 MCG: 50 INJECTION INTRAMUSCULAR; INTRAVENOUS at 11:54

## 2022-11-03 RX ADMIN — ACETAMINOPHEN 975 MG: 325 TABLET ORAL at 05:07

## 2022-11-03 RX ADMIN — HYDROMORPHONE HYDROCHLORIDE 0.5 MG: 1 INJECTION, SOLUTION INTRAMUSCULAR; INTRAVENOUS; SUBCUTANEOUS at 14:19

## 2022-11-03 RX ADMIN — PHENYLEPHRINE HYDROCHLORIDE 50 MCG/MIN: 10 INJECTION INTRAVENOUS at 12:36

## 2022-11-03 RX ADMIN — FENTANYL CITRATE 50 MCG: 50 INJECTION INTRAMUSCULAR; INTRAVENOUS at 12:04

## 2022-11-03 RX ADMIN — SUGAMMADEX 200 MG: 100 INJECTION, SOLUTION INTRAVENOUS at 13:13

## 2022-11-03 RX ADMIN — TRANEXAMIC ACID 1000 G: 100 INJECTION, SOLUTION INTRAVENOUS at 12:15

## 2022-11-03 RX ADMIN — EPHEDRINE SULFATE 10 MG: 50 INJECTION INTRAVENOUS at 12:24

## 2022-11-03 RX ADMIN — CEFAZOLIN SODIUM 2000 MG: 2 SOLUTION INTRAVENOUS at 21:43

## 2022-11-03 RX ADMIN — DOCUSATE SODIUM 100 MG: 100 CAPSULE, LIQUID FILLED ORAL at 17:09

## 2022-11-03 NOTE — OCCUPATIONAL THERAPY NOTE
Occupational Therapy         Patient Name: Hasmukh Barger  KSHHP'E Date: 11/3/2022         11/03/22 1100   OT Last Visit   OT Visit Date 11/03/22   Note Type   Note type Evaluation; Cancelled Session   Cancel Reasons Patient to operating room     [facility]

## 2022-11-03 NOTE — ANESTHESIA PREPROCEDURE EVALUATION
Procedure:  OPEN REDUCTION W/ INTERNAL FIXATION (ORIF) PERIPROSTHETIC KNEE (Right Knee)    Relevant Problems   No relevant active problems   CABGx2  Years ago, doing well per patient from cardiac standpoint   Obese    History of carotid endarterectomy; Physical Exam    Airway    Mallampati score: II  TM Distance: >3 FB  Neck ROM: full     Dental       Cardiovascular      Pulmonary      Other Findings        Anesthesia Plan  ASA Score- 2     Anesthesia Type- general with ASA Monitors  Additional Monitors:   Airway Plan: ETT  Comment: Femoral Block for post op pain per surgeon request          Plan Factors-    Chart reviewed  Induction- intravenous  Postoperative Plan-     Informed Consent- Anesthetic plan and risks discussed with patient  I personally reviewed this patient with the CRNA  Discussed and agreed on the Anesthesia Plan with the CRNA  Karo Leyva

## 2022-11-03 NOTE — ASSESSMENT & PLAN NOTE
- Consulted Orthopedics:   - Non weight bearing right lower extremity in knee immobilizer   - To OR for ORIF of right distal femur fracture vs revision arthroplasty  - Pain control PRN  - NPO for OR

## 2022-11-03 NOTE — TELEPHONE ENCOUNTER
Caller: Patient's wife Debbi     Doctor: Dagoberto Gibson    Reason for call: Patient's wife calling to speak to the Dr about the surgery (R distal femur periprosthetic fracture) , and the care for the patient after he is released  She is in the hospital now and is looking for a call back to review his surgery   Thank you    Call back#: [phone]

## 2022-11-03 NOTE — ASSESSMENT & PLAN NOTE
Consult orthopaedics - appreciate recs  Admission to trauma service  Pain control   NPO in case going to OR

## 2022-11-03 NOTE — H&P
[facility]  H&P- Dannie Rod 1945, 77 y o  male MRN: [id number]  Unit/Bed#: ED 32 Encounter: [id number]  Primary Care Provider: No primary care provider on file  Date and time admitted to hospital: 11/2/2022  8:59 PM    Periprosthetic fracture of knee  Assessment & Plan  Consult orthopaedics - appreciate recs  Pain control       Trauma Alert: Evaluation; trauma team arrived at 9:10 PM   Model of Arrival: Transfer from [address] [place] [address] Team: Attending Keerthi Ramsey and Residents Marquita Jaquez  Consultants:     Orthopedics: routine consult; Epic consult order placed; History of Present Illness     Chief Complaint: R leg pain  Mechanism:Fall     HPI:    Dannie Rod is a 77 y o  male who presents s/p slip and fall off embankment while fishing with R leg pain  Pain located to R distal femur and knee  +headstrike, -LOC  Trauma workup at [facility] [place] was negative aside from R knee periprosthetic fx  Pt is a community ambulator and baseline, takes ASA 81 mg QD  Is retired  B/l TKA performed by Aern To at [facility], R knee 2 yrs ago, L knee 3 yrs ago  Review of Systems   Constitutional: Negative for fatigue and fever  Respiratory: Negative for cough and shortness of breath  Cardiovascular: Negative for chest pain and palpitations  Gastrointestinal: Negative for abdominal pain  Musculoskeletal: Positive for back pain  Chronic back pain  R Leg pain   Skin: Positive for color change, rash and wound  Neurological: Negative for dizziness, syncope, weakness, light-headedness, numbness and headaches  Psychiatric/Behavioral: Negative for agitation and confusion  The patient is not nervous/anxious  12-point, complete review of systems was reviewed and negative except as stated above  Historical Information     History reviewed  No pertinent past medical history  History reviewed  No pertinent surgical history       Social History     Tobacco Use   • Smoking status: Former Smoker   • Smokeless tobacco: Former User   Substance Use Topics   • Alcohol use: Not Currently       There is no immunization history on file for this patient  Last Tetanus: Unknown  Family History: Non-contributory    1  Before the illness or injury that brought you to the Emergency, did you need someone to help you on a regular basis? 0=No   2  Since the illness or injury that brought you to the Emergency, have you needed more help than usual to take care of yourself? 0=No   3  Have you been hospitalized for one or more nights during the past 6 months (excluding a stay in the Emergency Department)? 0=No   4  In general, do you see well? 0=Yes   5  In general, do you have serious problems with your memory? 0=No   6  Do you take more than three different medications everyday?  1=Yes   TOTAL   1     Did you order a geriatric consult if the score was 2 or greater?: n/a     Meds/Allergies   all current active meds have been reviewed and current meds:   Current Facility-Administered Medications   Medication Dose Route Frequency   • acetaminophen (TYLENOL) tablet 975 mg  975 mg Oral Q8H [address]   • docusate sodium (COLACE) capsule 100 mg  100 mg Oral BID   • enoxaparin (LOVENOX) subcutaneous injection 30 mg  30 mg Subcutaneous Q12H [address]   • lactated ringers infusion  75 mL/hr Intravenous Continuous   • naloxone (NARCAN) 0 04 mg/mL syringe 0 04 mg  0 04 mg Intravenous Q1MIN PRN   • ondansetron (ZOFRAN) injection 4 mg  4 mg Intravenous Q6H PRN   • oxyCODONE (ROXICODONE) IR tablet 2 5 mg  2 5 mg Oral Q4H PRN   • oxyCODONE (ROXICODONE) IR tablet 5 mg  5 mg Oral Q4H PRN      No Known Allergies    Objective   Initial Vitals:   Temperature: 100 °F (37 8 °C) (11/02/22 2108)  Pulse: 95 (11/02/22 2108)  Respirations: 18 (11/02/22 2108)  Blood Pressure: 99/60 (11/02/22 2108)    Primary Survey:   Airway:        Status: patent;        Pre-hospital Interventions: none        Hospital Interventions: none  Breathing:        Pre-hospital Interventions: none       Effort: normal       Right breath sounds: normal       Left breath sounds: normal  Circulation:        Rhythm: regular       Rate: regular   Right Pulses Left Pulses    R radial: 2+                    Disability:        GCS: Eye: 4; Verbal: 5 Motor: 6 Total: 15       Right Pupil: round;  reactive         Left Pupil:  round;  reactive      R Motor Strength L Motor Strength             Sensory:  No sensory deficit  Exposure:       Completed: Yes      Secondary Survey:  Physical Exam  Constitutional:       Appearance: Normal appearance  Eyes:      Pupils: Pupils are equal, round, and reactive to light  Cardiovascular:      Rate and Rhythm: Normal rate and regular rhythm  Pulses: Normal pulses  Pulmonary:      Effort: Pulmonary effort is normal       Breath sounds: Normal breath sounds  Abdominal:      General: Abdomen is flat  Palpations: Abdomen is soft  Musculoskeletal:         General: Swelling and tenderness present  Cervical back: Normal range of motion  Comments: RLE TTP over distal femur and knee  Moderate swelling about R knee  Sensation intact  Able to flex EHL/FHL   Skin:     General: Skin is warm and dry  Neurological:      General: No focal deficit present  Mental Status: He is alert and oriented to person, place, and time  Psychiatric:         Mood and Affect: Mood normal          Behavior: Behavior normal          Invasive Devices  Report    Peripheral Intravenous Line  Duration           Peripheral IV 11/02/22 Distal;Right;Upper;Ventral (anterior) Arm <1 day              Lab Results:   BMP/CMP:   Lab Results   Component Value Date    SODIUM 142 11/02/2022    K 4 1 11/02/2022     11/02/2022    CO2 29 11/02/2022    BUN 17 11/02/2022    CREATININE 1 05 11/02/2022    CALCIUM 8 9 11/02/2022    EGFR 69 11/02/2022       Imaging Results: I have personally reviewed pertinent reports      Chest Xray(s): see below   FAST exam(s): see below   CT Scan(s): see below   Additional Xray(s): see below     Other Studies: n/a    R Knee XR: Displaced R knee periprosthetic fx    Code Status: No Order  Advance Directive and Living Will:      Power of :    POLST:    I have spent 30 minutes with Patient  today in which greater than 50% of this time was spent in counseling/coordination of care regarding Diagnostic results, Prognosis and Impressions

## 2022-11-03 NOTE — PROGRESS NOTES
[facility]  Progress Note - Le Mao 1945, 77 y o  male MRN: [id number]  Unit/Bed#: [facility] 628-01 Encounter: [id number]  Primary Care Provider: Olena Lennox, MD   Date and time admitted to hospital: 11/2/2022  8:59 PM    Periprosthetic fracture of knee  Assessment & Plan  - Consulted Orthopedics:   - Non weight bearing  right lower extremity in knee immobilizer   - To OR for ORIF of  right distal femur fracture vs revision arthroplasty  - Pain control PRN  - NPO for OR        TRAUMA TERTIARY SURVEY NOTE    VTE Prophylaxis:Sequential compression device (Venodyne)  and Enoxaparin (Lovenox)     Disposition: to OR with Ortho today, pending PT/OT evaluations post-op    Code status:  Level 1 - Full Code    Consultants: IP CONSULT TO ORTHOPEDIC SURGERY  IP CONSULT TO CASE MANAGEMENT    Subjective   Transfer from: [facility]    Mechanism of Injury:Fall     Chief Complaint: right knee/thigh pain    HPI/Last 24 hour events: Trauma transfer from [facility]  Per H&P, slipped and fell off of an embankment while fishing and had immediate onset of right leg pain  Positive for head strike, negative for loss of consciousness  Trauma workup at [address] [place] [address] was negative except for femur fracture of the right periprosthetic knee  He is a retired community ambulator at baseline, and has had bilateral total knee arthroplasty, right knee 2 years ago, left knee 3 years ago  He takes ASA 81 mg daily  This morning he reports throbbing in the right knee and thigh, inability to flex his right lower extremity  He does have a knee immobilizer in place  He has no other pain or complaints this morning  He does note chronic back pain, exacerbated by his fall, particularly in the lower back area  He also has chronic left hip pain to arthritis       Objective   Vitals:   Temp:  [97 5 °F (36 4 °C)-100 °F (37 8 °C)] 97 7 °F (36 5 °C)  HR:  [] 78  Resp:  [18-19] 19  BP: ()/(55-91) 98/61    I/O       11/01 0701 11/02 0700 11/02 0701 11/03 0700 11/03 0701 11/04 0700    Urine (mL/kg/hr)  250     Total Output  250     Net  -250                   Physical Exam:   GENERAL APPEARANCE:  Lying in bed appears comfortable, knee immobilizer in place  NEURO:  Alert and oriented x3, distal right lower extremity motor and sensation Intact   HEENT:  normocephalic, atraumatic  CV:  Regular rate and rhythm  LUNGS:  Clear to auscultation bilaterally, normal work of breathing on room air  GI:  Soft, nondistended  :  No Wade in place  MSK:  Right knee immobilizer in place, significant swelling circumferentially a of right thigh and knee, distal right lower extremity sensation, motor intact  Swelling of right lower leg and foot  Bilateral 2+ DP and PT pulses  SKIN:  Warm and dry    Invasive Devices  Report    Peripheral Intravenous Line  Duration           Peripheral IV 11/02/22 Distal;Right;Upper;Ventral (anterior) Arm <1 day                   1  Before the illness or injury that brought you to the Emergency, did you need someone to help you on a regular basis? 0=No   2  Since the illness or injury that brought you to the Emergency, have you needed more help than usual to take care of yourself? 1=Yes   3  Have you been hospitalized for one or more nights during the past 6 months (excluding a stay in the Emergency Department)? 0=No   4  In general, do you see well? 0=Yes   5  In general, do you have serious problems with your memory? 0=No   6  Do you take more than three different medications everyday? 0=No   TOTAL   1     Did you order a geriatric consult if the score was 2 or greater?: n/a         Lab Results: Results: I have personally reviewed all pertinent laboratory/tests results    Imaging Results: I have personally reviewed pertinent reports     and I have personally reviewed pertinent films in PACS  Chest Xray(s): negative for acute findings   FAST exam(s): negative for acute findings   CT Scan(s): CT CAP, Head, C-spine all negative for acute findings   Additional Xray(s): XR R Ankle, Tibia/Fibula, Femur, Knee  - Acute comminuted distal femoral periprosthetic fracture  - No acute osseous abnormality in the proximal femur, tibia-fibula, or ankle       Other Studies: N/A

## 2022-11-03 NOTE — ANESTHESIA POSTPROCEDURE EVALUATION
Post-Op Assessment Note    CV Status:  Stable  Pain Score: 0    Pain management: adequate     Mental Status:  Alert and awake   Hydration Status:  Euvolemic   PONV Controlled:  Controlled   Airway Patency:  Patent      Post Op Vitals Reviewed: Yes      Staff: CRNA         No complications documented      BP   91/53   Temp 97   Pulse 84   Resp 16   SpO2 100

## 2022-11-03 NOTE — PROGRESS NOTES
Progress Note - Orthopedics   Earl Molina 77 y o  male MRN: [id number]  Unit[facility]      Subjective:  77 y  o male w PMH s/f RUSSELL w R distal femur periprosthetic fracture s/p mech fall while fishing  No acute events, no new complaints  Patient doing well  Pain well controlled  Denies fevers, chills, CP, SOB, N/V, numbness or tingling  Patient reports no issues with urination or bowel movements  Patient states that he is ready to go to the OR today      Labs:  0   Lab Value Date/Time    HCT 37 9 11/02/2022 1444    HGB 12 3 11/02/2022 1444    INR 1 01 11/02/2022 1444    WBC 13 20 (H) 11/02/2022 1444       Meds:    Current Facility-Administered Medications:   •  acetaminophen (TYLENOL) tablet 975 mg, 975 mg, Oral, Q8H [facility] [place], Rena Hong MD, 975 mg at 11/03/22 0507  •  aspirin (ECOTRIN LOW STRENGTH) EC tablet 81 mg, 81 mg, Oral, Daily, Rena Hong MD  •  atorvastatin (LIPITOR) tablet 40 mg, 40 mg, Oral, Daily With Dinner, Rena Hong MD  •  docusate sodium (COLACE) capsule 100 mg, 100 mg, Oral, BID, Rena Hong MD, 100 mg at 11/03/22 0855  •  enoxaparin (LOVENOX) subcutaneous injection 30 mg, 30 mg, Subcutaneous, Q12H [facility] [place], Rena Hong MD, 30 mg at 11/02/22 2304  •  ezetimibe (ZETIA) tablet 10 mg, 10 mg, Oral, HS, Rena Hong MD, 10 mg at 11/02/22 2305  •  lactated ringers infusion, 75 mL/hr, Intravenous, Continuous, Rena Hong MD, Last Rate: 75 mL/hr at 11/02/22 2259, 75 mL/hr at 11/02/22 2259  •  metoprolol tartrate (LOPRESSOR) partial tablet 12 5 mg, 12 5 mg, Oral, Q12H [facility] [place], Rena Hong MD, 12 5 mg at 11/02/22 2305  •  multivitamin-minerals (CENTRUM) tablet 1 tablet, 1 tablet, Oral, Daily, Rena Hong MD, 1 tablet at 11/03/22 0854  •  naloxone (NARCAN) 0 04 mg/mL syringe 0 04 mg, 0 04 mg, Intravenous, Q1MIN PRN, Rena Hong MD  •  ondansetron [facility]) injection 4 mg, 4 mg, Intravenous, Q6H PRN, Rena Hong MD  •  oxyCODONE (ROXICODONE) IR tablet 2 5 mg, 2 5 mg, Oral, Q4H PRN, Shaji Dumont MD  •  oxyCODONE (ROXICODONE) IR tablet 5 mg, 5 mg, Oral, Q4H PRN, Shaji Dumont MD, 5 mg at 11/03/22 3893  •  povidone-iodine (BETADINE) 10 % 20 application in sodium chloride 0 9 % 500 mL irrigation bottle, , Irrigation, Once, Ebonie Billingsley MD  •  rOPINIRole (REQUIP) tablet 1 mg, 1 mg, Oral, HS, Shaji Dumont MD, 1 mg at 11/02/22 2305  •  tamsulosin (FLOMAX) capsule 0 4 mg, 0 4 mg, Oral, Daily With Cari Harris MD    Blood Culture:   No results found for: BLOODCX    Wound Culture:   No results found for: WOUNDCULT    Ins and Outs:  I/O last 24 hours: In: 757 5 [I V :757 5]  Out: 375 [Urine:375]          Physical:  Vitals:    11/03/22 1032   BP: 107/52   Pulse: 74   Resp: 18   Temp: 98 1 °F (36 7 °C)   SpO2: 98%     Musculoskeletal: right Lower Extremity  · Skin intact  No erythema or ecchymosis  Moderate swelling about R knee  In knee immobilizer  · TTP distal femur and knee  · Sensation intact to saphenous, sural, tibial, superficial peroneal nerve, and deep peroneal  · Motor intact to +FHL/EHL, +ankle dorsi/plantar flexion  · 2+ DP pulse, symmetric bilaterally  · Digits warm and well perfused  · Capillary refill < 2 seconds    Assessment:    76 y  o male w r periprosthetic distal femur fracture  Patient doing well       Plan:  · NWB RLE in KI  · To OR today for ORIF R femur  · Greater than 2 gram drop which qualifies for diagnosis of acute blood loss anemia, will monitor and administer IVF/prbc as indicated   · PT/OT  · Pain control  · DVT ppx  · Medical co-morbidities include RUSSELL, which are being managed per primary team  · Dispo: Ortho will follow    Daren Armendariz MD

## 2022-11-03 NOTE — CASE MANAGEMENT
Case Management Assessment & Discharge Planning Note    Patient name Juanjose Rosado  Location [facility] 628/[facility] 437-66 MRN [id number]  : 1945 Date 11/3/2022       Current Admission Date: 2022  Current Admission Diagnosis:Periprosthetic fracture of knee   Patient Active Problem List    Diagnosis Date Noted   • Periprosthetic fracture of knee 2022      LOS (days): 1  Geometric Mean LOS (GMLOS) (days): 2 70  Days to GMLOS:1 9     OBJECTIVE:    Risk of Unplanned Readmission Score: 9 59         Current admission status: Inpatient       Preferred Pharmacy:   [facility] [address] [place] [address] [facility] [address] [place] [address]  Phone: [phone] Fax: [phone]    Primary Care Provider: Rosaline Rahman MD    Primary Insurance: MEDICARE  Secondary Insurance: AET[employer]    ASSESSMENT:  Active Health Care Proxies    There are no active Health Care Proxies on file  Advance Directives  Does patient have a Health Care POA?: Yes  Does patient have Advance Directives?: Yes  Advance Directives: Power of  for health care  Primary Contact: Keyonna Plummer-Spouse         Readmission Root Cause  30 Day Readmission: No    Patient Information  Admitted from[de-identified] Facility ([facility] ED)  Mental Status: Alert  During Assessment patient was accompanied by: Spouse  Assessment information provided by[de-identified] Patient, Spouse  Primary Caregiver: Self  Support Systems: Spouse/significant other  [place] of Residence:  [facility] do you live in?: [address] entry access options  Select all that apply : Stairs (Enters home in the rear of house)  Number of steps to enter home  : 1  Do the steps have railings?: Yes  Type of Current Residence:  ([facility] Level)  In the last 12 months, was there a time when you were not able to pay the mortgage or rent on time?: No  In the last 12 months, how many places have you lived?: 1  In the last 12 months, was there a time when you did not have a steady place to sleep or slept in a shelter (including now)?: No  Homeless/housing insecurity resource given?: N/A  Living Arrangements: Lives w/ Spouse/significant other  Is patient a ?: No    Activities of Daily Living Prior to Admission  Functional Status: Independent  Completes ADLs independently?: Yes  Ambulates independently?: Yes  Does patient use assisted devices?: Yes  Assisted Devices (DME) used: [employer] [place] (when needed at times)  Does patient currently own DME?: Yes  What DME does the patient currently own?: Meera Adam  Does patient have a history of Outpatient Therapy (PT/OT)?: Yes ([address] [facility])  Does the patient have a history of Short-Term Rehab?: Yes ([facility])  Does patient have a history of HHC?: Yes ([facility])  Does patient currently have [address]?: No         Patient Information Continued  Income Source: Pension/longterm  Does patient have prescription coverage?: No  Within the past 12 months, you worried that your food would run out before you got the money to buy more : Never true  Within the past 12 months, the food you bought just didn't last and you didn't have money to get more : Never true  Food insecurity resource given?: N/A  Does patient receive dialysis treatments?: No  Does patient have a history of substance abuse?: No  Does patient have a history of Mental Health Diagnosis?: No         Means of Transportation  Means of Transport to Appts[de-identified] Drives Self  In the past 12 months, has lack of transportation kept you from medical appointments or from getting medications?: No  In the past 12 months, has lack of transportation kept you from meetings, work, or from getting things needed for daily living?: No  Was application for public transport provided?: N/A    DISCHARGE DETAILS:    Discharge planning discussed with[de-identified] patient and spouse at bedside  Freedom of Choice: Yes  Comments - Freedom of Choice: pending recs  CM contacted family/caregiver?: Yes  Were Treatment Team discharge recommendations reviewed with patient/caregiver?: Yes  Did patient/caregiver verbalize understanding of patient care needs?: Yes  Were patient/caregiver advised of the risks associated with not following Treatment Team discharge recommendations?: Yes    Contacts  Patient Contacts: Keyonna Plummer-Spouse  Relationship to Patient[de-identified] Family  Contact Method:  In Person  Phone Number: [phone] / [phone]  Reason/Outcome: Continuity of Care, Emergency Contact, Discharge Planning  Other Referral/Resources/Interventions Provided:  Referral Comments: pending recs    Would you like to participate in our [address] service program?  : Patient would like more information (if we can do a price check for any new medications needed)

## 2022-11-03 NOTE — PHYSICAL THERAPY NOTE
Physical Therapy Cancellation Note      PT orders received and chart reviewed  Per ortho, pt pending OR for ORIF of right distal femur fracture vs revision arthroplasty  Will defer PT eval at this time and continue to follow and evaluate as appropriate post op       Kate Morrison, PT, DPT

## 2022-11-03 NOTE — CONSULTS
Orthopedics   Henry Gomez 77 y o  male MRN: [id number]  Unit/Bed#: ED 26      Chief Complaint:   right knee pain    HPI:   77 y  o male community ambulator without assist status post fall [place] with twisting injury complaining of right knee pain and inability to bear weight  Positive Headstrike, No LOC, Takes ASA, last dose this AM  Pain is sharp in character, Located medially to knee, acute in onset, constant in duration, severe in intensity  Exacerbating factors motion and palpation, remitting factors rest  Nonradiating, no numbness, no tingling, no open wounds noted  No other complaints at this time  Patient has a hx of right TKA done by Dr Sixto Shaikh at [facility] in 2020  PMH significant for [employer]  Occupation retired  Review Of Systems:   · Skin: Normal, post surgical scar over anterior knee s/p TKA  · Neuro: See HPI  · Musculoskeletal: See HPI  · 14 point review of systems negative except as stated above     Past Medical History:   History reviewed  No pertinent past medical history  Past Surgical History:   History reviewed  No pertinent surgical history  Family History:  Family history reviewed and non-contributory  History reviewed  No pertinent family history      Social History:  Social History     Socioeconomic History   • Marital status: /Civil Union     Spouse name: None   • Number of children: None   • Years of education: None   • Highest education level: None   Occupational History   • None   Tobacco Use   • Smoking status: Former Smoker   • Smokeless tobacco: Former User   Substance and Sexual Activity   • Alcohol use: Not Currently   • Drug use: None   • Sexual activity: None   Other Topics Concern   • None   Social History Narrative   • None     Social Determinants of Health     Financial Resource Strain: Not on file   Food Insecurity: Not on file   Transportation Needs: Not on file   Physical Activity: Not on file   Stress: Not on file   Social Connections: Not on file   Intimate Partner Violence: Not on file   Housing Stability: Not on file       Allergies:   No Known Allergies        Labs:  0   Lab Value Date/Time    HCT 37 9 11/02/2022 1444    HGB 12 3 11/02/2022 1444    INR 1 01 11/02/2022 1444    WBC 13 20 (H) 11/02/2022 1444       Meds:    Current Facility-Administered Medications:   •  acetaminophen (TYLENOL) tablet 975 mg, 975 mg, Oral, Q8H [facility], Alec Burden MD  •  [START ON 11/3/2022] atorvastatin (LIPITOR) tablet 40 mg, 40 mg, Oral, Daily With Dinner, Alec Burden MD  •  docusate sodium (COLACE) capsule 100 mg, 100 mg, Oral, BID, Alec Burden MD  •  enoxaparin (LOVENOX) subcutaneous injection 30 mg, 30 mg, Subcutaneous, Q12H [facility], Alec Burden MD  •  ezetimibe (ZETIA) tablet 10 mg, 10 mg, Oral, HS, Alec Burden MD  •  lactated ringers infusion, 75 mL/hr, Intravenous, Continuous, Alec Burden MD  •  metoprolol tartrate (LOPRESSOR) partial tablet 12 5 mg, 12 5 mg, Oral, Q12H [facility], Alec Burden MD  •  [START ON 11/3/2022] multivitamin-minerals (CENTRUM) tablet 1 tablet, 1 tablet, Oral, Daily, Alec Burden MD  •  naloxone (NARCAN) 0 04 mg/mL syringe 0 04 mg, 0 04 mg, Intravenous, Q1MIN PRN, Alec Burden MD  •  ondansetron [facility]) injection 4 mg, 4 mg, Intravenous, Q6H PRN, Alec Burden MD  •  oxyCODONE (ROXICODONE) IR tablet 2 5 mg, 2 5 mg, Oral, Q4H PRN, Alce Burden MD  •  oxyCODONE (ROXICODONE) IR tablet 5 mg, 5 mg, Oral, Q4H PRN, Alec Burden MD  •  rOPINIRole (REQUIP) tablet 1 mg, 1 mg, Oral, HS, Alec Burden MD  •  [START ON 11/3/2022] tamsulosin (FLOMAX) capsule 0 4 mg, 0 4 mg, Oral, Daily With Lex Branch MD  No current outpatient medications on file  Blood Culture:   No results found for: BLOODCX    Wound Culture:   No results found for: WOUNDCULT    Ins and Outs:  No intake/output data recorded            Physical Exam:   /63   Pulse 92   Temp 100 °F (37 8 °C) (Oral)   Resp 18   SpO2 93% Gen: No acute distress, resting comfortably in bed  HEENT: Eyes clear, moist mucus membranes, hearing intact  Respiratory: No audible wheezing or stridor  Cardiovascular: Well Perfused peripherally, 2+ distal pulse  Abdomen: nondistended, no peritoneal signs  Musculoskeletal: right lower extremity  · Skin intact, well healed surgical incision over anterior aspect of knee  No lacerations, minor abrasions, no ecchymosis  · Tender to palpation over entire knee and proximal tibia  · ROM not assessed 2/2 known fracture  · SILT sural, saph, SPN, DPN, and tibial nerve  · Positive ankle dorsi/plantar flexion, EHL/FHL  · 1+ DP pulse, WWP, brisk cap refill  · Musculature is soft and compressible, no pain with passive stretch  · Leg lengths equal    Radiology:   I personally reviewed the films  X-rays AP/Lateral and 4 views right knee shows comminuted distal femur periprosthetic fracture  Assessment:  77 y  o male status post twisting fall with right distal femur periprosthetic fracture  Plan:   · Non weight bearing right lower extremity in knee immobilizer  · To OR for ORIF of right distal femur fracture vs revision arthroplasty  · Analgesics for pain  · Informed consent obtained    · Pre op labs in ED  · NPO at midnight  · Trauma team for clearance to OR  · Dispo: Ortho will follow    Na Waggoner MD

## 2022-11-03 NOTE — ANESTHESIA PROCEDURE NOTES
Peripheral Block    Patient location during procedure: holding area  Start time: 11/3/2022 11:40 AM  Reason for block: at surgeon's request and post-op pain management  Staffing  Performed: Anesthesiologist   Anesthesiologist: Missy Hammond MD  Preanesthetic Checklist  Completed: patient identified, IV checked, site marked, risks and benefits discussed, surgical consent, monitors and equipment checked, pre-op evaluation and timeout performed  Peripheral Block  Patient position: supine  Prep: ChloraPrep  Patient monitoring: continuous pulse ox and frequent blood pressure checks  Block type: femoral  Laterality: right  Injection technique: single-shot  Procedures: ultrasound guided, Ultrasound guidance required for the procedure to increase accuracy and safety of medication placement and decrease risk of complications    Ultrasound permanent image saved  Needle  Needle gauge: 22 G  Needle length: 10 cm  Needle localization: ultrasound guidance  Test dose: negative  Assessment  Injection assessment: incremental injection, local visualized surrounding nerve on ultrasound, negative aspiration for heme and no paresthesia on injection  Paresthesia pain: none  Heart rate change: no  Slow fractionated injection: yes  Post-procedure:  site cleaned  patient tolerated the procedure well with no immediate complications

## 2022-11-03 NOTE — QUICK NOTE
Attempted to call spouse, Elma Else, without answer  Message was unable to be left   Will try again later today or tomorrow

## 2022-11-03 NOTE — PLAN OF CARE
Problem: Potential for Falls  Goal: Patient will remain free of falls  Description: INTERVENTIONS:  - Educate patient/family on patient safety including physical limitations  - Instruct patient to call for assistance with activity   - Consult OT/PT to assist with strengthening/mobility   - Keep Call bell within reach  - Keep bed low and locked with side rails adjusted as appropriate  - Keep care items and personal belongings within reach  - Initiate and maintain comfort rounds  - Make Fall Risk Sign visible to staff  - Apply yellow socks and bracelet for high fall risk patients  - Consider moving patient to room near nurses station  Outcome: Progressing     Problem: PAIN - ADULT  Goal: Verbalizes/displays adequate comfort level or baseline comfort level  Description: Interventions:  - Encourage patient to monitor pain and request assistance  - Assess pain using appropriate pain scale  - Administer analgesics based on type and severity of pain and evaluate response  - Implement non-pharmacological measures as appropriate and evaluate response  - Consider cultural and social influences on pain and pain management  - Notify physician/advanced practitioner if interventions unsuccessful or patient reports new pain  Outcome: Progressing     Problem: SAFETY ADULT  Goal: Patient will remain free of falls  Description: INTERVENTIONS:  - Educate patient/family on patient safety including physical limitations  - Instruct patient to call for assistance with activity   - Consult OT/PT to assist with strengthening/mobility   - Keep Call bell within reach  - Keep bed low and locked with side rails adjusted as appropriate  - Keep care items and personal belongings within reach  - Initiate and maintain comfort rounds  - Make Fall Risk Sign visible to staff  - Apply yellow socks and bracelet for high fall risk patients  - Consider moving patient to room near nurses station  Outcome: Progressing  Goal: Maintain or return to baseline ADL function  Description: INTERVENTIONS:  -  Assess patient's ability to carry out ADLs; assess patient's baseline for ADL function and identify physical deficits which impact ability to perform ADLs (bathing, care of mouth/teeth, toileting, grooming, dressing, etc )  - Assess/evaluate cause of self-care deficits   - Assess range of motion  - Assess patient's mobility; develop plan if impaired  - Assess patient's need for assistive devices and provide as appropriate  - Encourage maximum independence but intervene and supervise when necessary  - Involve family in performance of ADLs  - Assess for home care needs following discharge   - Consider OT consult to assist with ADL evaluation and planning for discharge  - Provide patient education as appropriate  Outcome: Progressing  Goal: Maintains/Returns to pre admission functional level  Description: INTERVENTIONS:  - Perform BMAT or MOVE assessment daily    - Set and communicate daily mobility goal to care team and patient/family/caregiver     - Collaborate with rehabilitation services on mobility goals if consulted  - Out of bed for toileting  - Record patient progress and toleration of activity level   Outcome: Progressing     Problem: DISCHARGE PLANNING  Goal: Discharge to home or other facility with appropriate resources  Description: INTERVENTIONS:  - Identify barriers to discharge w/patient and caregiver  - Arrange for needed discharge resources and transportation as appropriate  - Identify discharge learning needs (meds, wound care, etc )  - Arrange for interpretive services to assist at discharge as needed  - Refer to Case Management Department for coordinating discharge planning if the patient needs post-hospital services based on physician/advanced practitioner order or complex needs related to functional status, cognitive ability, or social support system  Outcome: Progressing     Problem: Knowledge Deficit  Goal: Patient/family/caregiver demonstrates understanding of disease process, treatment plan, medications, and discharge instructions  Description: Complete learning assessment and assess knowledge base    Interventions:  - Provide teaching at level of understanding  - Provide teaching via preferred learning methods  Outcome: Progressing     Problem: Prexisting or High Potential for Compromised Skin Integrity  Goal: Skin integrity is maintained or improved  Description: INTERVENTIONS:  - Identify patients at risk for skin breakdown  - Assess and monitor skin integrity  - Assess and monitor nutrition and hydration status  - Monitor labs   - Assess for incontinence   - Turn and reposition patient  - Assist with mobility/ambulation  - Relieve pressure over bony prominences  - Avoid friction and shearing  - Provide appropriate hygiene as needed including keeping skin clean and dry  - Evaluate need for skin moisturizer/barrier cream  - Collaborate with interdisciplinary team   - Patient/family teaching  - Consider wound care consult   Outcome: Progressing     Problem: MOBILITY - ADULT  Goal: Maintain or return to baseline ADL function  Description: INTERVENTIONS:  -  Assess patient's ability to carry out ADLs; assess patient's baseline for ADL function and identify physical deficits which impact ability to perform ADLs (bathing, care of mouth/teeth, toileting, grooming, dressing, etc )  - Assess/evaluate cause of self-care deficits   - Assess range of motion  - Assess patient's mobility; develop plan if impaired  - Assess patient's need for assistive devices and provide as appropriate  - Encourage maximum independence but intervene and supervise when necessary  - Involve family in performance of ADLs  - Assess for home care needs following discharge   - Consider OT consult to assist with ADL evaluation and planning for discharge  - Provide patient education as appropriate  Outcome: Progressing  Goal: Maintains/Returns to pre admission functional level  Description: INTERVENTIONS:  - Perform BMAT or MOVE assessment daily    - Set and communicate daily mobility goal to care team and patient/family/caregiver     - Collaborate with rehabilitation services on mobility goals if consulted  - Record patient progress and toleration of activity level   Outcome: Progressing     Problem: MUSCULOSKELETAL - ADULT  Goal: Maintain or return mobility to safest level of function  Description: INTERVENTIONS:  - Assess patient's ability to carry out ADLs; assess patient's baseline for ADL function and identify physical deficits which impact ability to perform ADLs (bathing, care of mouth/teeth, toileting, grooming, dressing, etc )  - Assess/evaluate cause of self-care deficits   - Assess range of motion  - Assess patient's mobility  - Assess patient's need for assistive devices and provide as appropriate  - Encourage maximum independence but intervene and supervise when necessary  - Involve family in performance of ADLs  - Assess for home care needs following discharge   - Consider OT consult to assist with ADL evaluation and planning for discharge  - Provide patient education as appropriate  Outcome: Progressing  Goal: Maintain proper alignment of affected body part  Description: INTERVENTIONS:  - Support, maintain and protect limb and body alignment  - Provide patient/ family with appropriate education  Outcome: Progressing

## 2022-11-03 NOTE — PLAN OF CARE
Problem: Potential for Falls  Goal: Patient will remain free of falls  Description: INTERVENTIONS:  - Educate patient/family on patient safety including physical limitations  - Instruct patient to call for assistance with activity   - Consult OT/PT to assist with strengthening/mobility   - Keep Call bell within reach  - Keep bed low and locked with side rails adjusted as appropriate  - Keep care items and personal belongings within reach  - Initiate and maintain comfort rounds  - Make Fall Risk Sign visible to staff  - Offer Toileting every 2 Hours, in advance of need  - Initiate/Maintain bedalarm  - Obtain necessary fall risk management equipment:   - Apply yellow socks and bracelet for high fall risk patients  - Consider moving patient to room near nurses station  Outcome: Progressing     Problem: PAIN - ADULT  Goal: Verbalizes/displays adequate comfort level or baseline comfort level  Description: Interventions:  - Encourage patient to monitor pain and request assistance  - Assess pain using appropriate pain scale  - Administer analgesics based on type and severity of pain and evaluate response  - Implement non-pharmacological measures as appropriate and evaluate response  - Consider cultural and social influences on pain and pain management  - Notify physician/advanced practitioner if interventions unsuccessful or patient reports new pain  Outcome: Progressing     Problem: SAFETY ADULT  Goal: Patient will remain free of falls  Description: INTERVENTIONS:  - Educate patient/family on patient safety including physical limitations  - Instruct patient to call for assistance with activity   - Consult OT/PT to assist with strengthening/mobility   - Keep Call bell within reach  - Keep bed low and locked with side rails adjusted as appropriate  - Keep care items and personal belongings within reach  - Initiate and maintain comfort rounds  - Make Fall Risk Sign visible to staff  - Offer Toileting every 2 Hours, in advance of need  - Initiate/Maintain bedalarm  - Obtain necessary fall risk management equipment:   - Apply yellow socks and bracelet for high fall risk patients  - Consider moving patient to room near nurses station  Outcome: Progressing  Goal: Maintain or return to baseline ADL function  Description: INTERVENTIONS:  -  Assess patient's ability to carry out ADLs; assess patient's baseline for ADL function and identify physical deficits which impact ability to perform ADLs (bathing, care of mouth/teeth, toileting, grooming, dressing, etc )  - Assess/evaluate cause of self-care deficits   - Assess range of motion  - Assess patient's mobility; develop plan if impaired  - Assess patient's need for assistive devices and provide as appropriate  - Encourage maximum independence but intervene and supervise when necessary  - Involve family in performance of ADLs  - Assess for home care needs following discharge   - Consider OT consult to assist with ADL evaluation and planning for discharge  - Provide patient education as appropriate  Outcome: Progressing  Goal: Maintains/Returns to pre admission functional level  Description: INTERVENTIONS:  - Perform BMAT or MOVE assessment daily    - Set and communicate daily mobility goal to care team and patient/family/caregiver  - Collaborate with rehabilitation services on mobility goals if consulted  - Perform Range of Motion 3 times a day  - Reposition patient every 2 hours    - Dangle patient 3 times a day  - Stand patient 3 times a day  - Ambulate patient 3 times a day  - Out of bed to chair 3 times a day   - Out of bed for meals 3 times a day  - Out of bed for toileting  - Record patient progress and toleration of activity level   Outcome: Progressing     Problem: DISCHARGE PLANNING  Goal: Discharge to home or other facility with appropriate resources  Description: INTERVENTIONS:  - Identify barriers to discharge w/patient and caregiver  - Arrange for needed discharge resources and transportation as appropriate  - Identify discharge learning needs (meds, wound care, etc )  - Arrange for interpretive services to assist at discharge as needed  - Refer to Case Management Department for coordinating discharge planning if the patient needs post-hospital services based on physician/advanced practitioner order or complex needs related to functional status, cognitive ability, or social support system  Outcome: Progressing     Problem: Knowledge Deficit  Goal: Patient/family/caregiver demonstrates understanding of disease process, treatment plan, medications, and discharge instructions  Description: Complete learning assessment and assess knowledge base    Interventions:  - Provide teaching at level of understanding  - Provide teaching via preferred learning methods  Outcome: Progressing

## 2022-11-03 NOTE — OP NOTE
OPERATIVE REPORT  PATIENT NAME: Earl Molina    :  1945  MRN: [id number]  Pt Location: [facility] OR ROOM 18    SURGERY DATE: 11/3/2022    Surgeon(s) and Role:     * Zoraida Allen MD - Primary     * Hermes Ballard PA-C - Assisting  Callum Weiss MD-resident assist  Preop Diagnosis:  Periprosthetic fracture of knee [D13  9XXA]  Periprosthetic fracture right distal femur  Post-Op Diagnosis Codes:     * Periprosthetic fracture of knee [H16  9XXA]  Periprosthetic fracture right distal femur  Procedure(s) (LRB):  OPEN REDUCTION W/ INTERNAL FIXATION (ORIF) PERIPROSTHETIC KNEE (Right)    Specimen(s):  * No specimens in log *    Estimated Blood Loss:   200 mL    Drains:  Closed/Suction Drain Anterior;Right Knee Accordion 10 Fr  (Active)   Site Description Unable to view 22 1515   Dressing Status Clean;Dry; Intact 22 1515   Drainage Appearance Bloody 22 1515   Status Accordion suction 22 1515   Number of days: 0       Urethral Catheter Latex;Straight-tip 16 Fr  (Active)   Reasons to continue Urinary Catheter  Post-operative urological requirements 22 1352   Site Assessment Clean;Skin intact 22 1352   Collection Container Standard drainage bag 22 1352   Output (mL) 100 mL 22 1515   Number of days: 0       Anesthesia Type:   Choice    Operative Indications:  Periprosthetic fracture of knee [Q58  9XXA]  Periprosthetic fracture right distal femur    Operative Findings:  ORIF utilizing 10 hole lateral locking plate  X-ray at end of case negative for foreign body    Complications:   None    Procedure and Technique: Following induction of adequate level of general anesthesia, Wade catheter sterilely introduced patient's bladder  Antibiotics administered  A bump was placed under right buttock in order to internally rotate the leg  The right lower extremity then underwent sterile prep and drape  His pre-existing midline knee scar was opened up and extended proximally  Full-thickness flaps raised get the extensor mechanism  A lateral arthrotomy open up the knee joint and allowed exposure of the fracture  The knee was flexed, the fracture was identified, it was noted to have medial and lateral comminution more medially than laterally  The fracture then brought out to length, rotation was corrected, and at this point time a 10 hole lateral locking plate was applied in standard fashion  Multiple screws which were locked cannulated and fully threaded were placed in the distal fracture fragment, multiple bicortical screws were placed within the proximal fracture fragment  Final fluoroscopic films document a well-aligned fracture, good hardware position  Satisfied with the extent of surgery, the wounds then flushed with saline closed  A drain was placed deep brought out via separate anteromedial stab incision  The arthrotomy was closed number Vicryl suture  The subcu tissue closed 2 Vicryl suture  Skin was closed staples  Sterile dressings were applied  A full-length x-ray of the right thigh was taken in the operating room which was negative for foreign body  Dressings were applied  He was then awakened from general anesthesia taken recovery room stable condition plans to include physical therapy nonweightbearing    He will require DVT prophylaxis with Lovenox   I was present for the entire procedure    Patient Disposition:  PACU         SIGNATURE: Vicenta Conteh MD  DATE: November 3, 2022  TIME: 4:57 PM

## 2022-11-04 LAB
ANION GAP SERPL CALCULATED.3IONS-SCNC: 3 MMOL/L (ref 4–13)
BUN SERPL-MCNC: 20 MG/DL (ref 5–25)
CALCIUM SERPL-MCNC: 8.1 MG/DL (ref 8.3–10.1)
CHLORIDE SERPL-SCNC: 107 MMOL/L (ref 96–108)
CO2 SERPL-SCNC: 30 MMOL/L (ref 21–32)
CREAT SERPL-MCNC: 0.86 MG/DL (ref 0.6–1.3)
ERYTHROCYTE [DISTWIDTH] IN BLOOD BY AUTOMATED COUNT: 13.1 % (ref 11.6–15.1)
GFR SERPL CREATININE-BSD FRML MDRD: 84 ML/MIN/1.73SQ M
GLUCOSE SERPL-MCNC: 188 MG/DL (ref 65–140)
HCT VFR BLD AUTO: 25.6 % (ref 36.5–49.3)
HGB BLD-MCNC: 8.2 G/DL (ref 12–17)
MCH RBC QN AUTO: 29.8 PG (ref 26.8–34.3)
MCHC RBC AUTO-ENTMCNC: 32 G/DL (ref 31.4–37.4)
MCV RBC AUTO: 93 FL (ref 82–98)
PLATELET # BLD AUTO: 176 THOUSANDS/UL (ref 149–390)
PMV BLD AUTO: 9.4 FL (ref 8.9–12.7)
POTASSIUM SERPL-SCNC: 4.7 MMOL/L (ref 3.5–5.3)
RBC # BLD AUTO: 2.75 MILLION/UL (ref 3.88–5.62)
SODIUM SERPL-SCNC: 140 MMOL/L (ref 135–147)
WBC # BLD AUTO: 11.79 THOUSAND/UL (ref 4.31–10.16)

## 2022-11-04 RX ORDER — METHOCARBAMOL 500 MG/1
500 TABLET, FILM COATED ORAL EVERY 8 HOURS SCHEDULED
Status: DISCONTINUED | OUTPATIENT
Start: 2022-11-04 | End: 2022-11-09 | Stop reason: HOSPADM

## 2022-11-04 RX ADMIN — DOCUSATE SODIUM 100 MG: 100 CAPSULE, LIQUID FILLED ORAL at 10:14

## 2022-11-04 RX ADMIN — ENOXAPARIN SODIUM 30 MG: 30 INJECTION SUBCUTANEOUS at 21:15

## 2022-11-04 RX ADMIN — ACETAMINOPHEN 975 MG: 325 TABLET ORAL at 12:46

## 2022-11-04 RX ADMIN — SODIUM CHLORIDE, SODIUM LACTATE, POTASSIUM CHLORIDE, AND CALCIUM CHLORIDE 75 ML/HR: .6; .31; .03; .02 INJECTION, SOLUTION INTRAVENOUS at 06:28

## 2022-11-04 RX ADMIN — METHOCARBAMOL 500 MG: 500 TABLET ORAL at 12:46

## 2022-11-04 RX ADMIN — METHOCARBAMOL 500 MG: 500 TABLET ORAL at 21:13

## 2022-11-04 RX ADMIN — ENOXAPARIN SODIUM 30 MG: 30 INJECTION SUBCUTANEOUS at 10:14

## 2022-11-04 RX ADMIN — OXYCODONE HYDROCHLORIDE 5 MG: 5 TABLET ORAL at 19:34

## 2022-11-04 RX ADMIN — Medication 1 TABLET: at 10:14

## 2022-11-04 RX ADMIN — Medication 12.5 MG: at 21:13

## 2022-11-04 RX ADMIN — EZETIMIBE 10 MG: 10 TABLET ORAL at 21:13

## 2022-11-04 RX ADMIN — ATORVASTATIN CALCIUM 40 MG: 40 TABLET, FILM COATED ORAL at 17:06

## 2022-11-04 RX ADMIN — TAMSULOSIN HYDROCHLORIDE 0.4 MG: 0.4 CAPSULE ORAL at 17:06

## 2022-11-04 RX ADMIN — ACETAMINOPHEN 975 MG: 325 TABLET ORAL at 21:14

## 2022-11-04 RX ADMIN — ROPINIROLE HYDROCHLORIDE 1 MG: 1 TABLET, FILM COATED ORAL at 21:13

## 2022-11-04 RX ADMIN — ASPIRIN 81 MG: 81 TABLET, COATED ORAL at 10:13

## 2022-11-04 RX ADMIN — DOCUSATE SODIUM 100 MG: 100 CAPSULE, LIQUID FILLED ORAL at 17:06

## 2022-11-04 RX ADMIN — OXYCODONE HYDROCHLORIDE 2.5 MG: 5 TABLET ORAL at 10:16

## 2022-11-04 RX ADMIN — CEFAZOLIN SODIUM 2000 MG: 2 SOLUTION INTRAVENOUS at 04:12

## 2022-11-04 RX ADMIN — ACETAMINOPHEN 975 MG: 325 TABLET ORAL at 06:01

## 2022-11-04 NOTE — ASSESSMENT & PLAN NOTE
- orthopedic recommendations s/p Right ORIF:   - Non weight bearing right lower extremity   - clear for discharge, will follow up in 2 weeks with Ortho  - APS recommendations:   - Right-sided single shot femoral nerve block with local anesthetic is functioning appropriately, patient does report some mild sensory deficit the medial aspect of his right knee as well as some mild motor deficit which has been resolving  Pain continues to be well controlled     - Tylenol 975 mg every 8 hours scheduled   - Robaxin 500 mg every 8 hours, for muscle spasms   - Oxycodone 2 5 mg every 4 hours as needed, for moderate pain   - Oxycodone 5 mg every 4 hours as needed for severe pain    - Colace 100 mg twice daily  - PT recommended Acute Rehab, placement pending  - medically appropriate for discharge once accepted

## 2022-11-04 NOTE — PLAN OF CARE
Problem: Potential for Falls  Goal: Patient will remain free of falls  Description: INTERVENTIONS:  - Educate patient/family on patient safety including physical limitations  - Instruct patient to call for assistance with activity   - Consult OT/PT to assist with strengthening/mobility   - Keep Call bell within reach  - Keep bed low and locked with side rails adjusted as appropriate  - Keep care items and personal belongings within reach  - Initiate and maintain comfort rounds  - Make Fall Risk Sign visible to staff  - Offer Toileting every 2 Hours, in advance of need  - Initiate/Maintain bedalarm  - Obtain necessary fall risk management equipment:   - Apply yellow socks and bracelet for high fall risk patients  - Consider moving patient to room near nurses station  Outcome: Progressing     Problem: PAIN - ADULT  Goal: Verbalizes/displays adequate comfort level or baseline comfort level  Description: Interventions:  - Encourage patient to monitor pain and request assistance  - Assess pain using appropriate pain scale  - Administer analgesics based on type and severity of pain and evaluate response  - Implement non-pharmacological measures as appropriate and evaluate response  - Consider cultural and social influences on pain and pain management  - Notify physician/advanced practitioner if interventions unsuccessful or patient reports new pain  Outcome: Progressing     Problem: SAFETY ADULT  Goal: Patient will remain free of falls  Description: INTERVENTIONS:  - Educate patient/family on patient safety including physical limitations  - Instruct patient to call for assistance with activity   - Consult OT/PT to assist with strengthening/mobility   - Keep Call bell within reach  - Keep bed low and locked with side rails adjusted as appropriate  - Keep care items and personal belongings within reach  - Initiate and maintain comfort rounds  - Make Fall Risk Sign visible to staff  - Offer Toileting every 2 Hours, in advance of need  - Initiate/Maintain bedalarm  - Obtain necessary fall risk management equipment:   - Apply yellow socks and bracelet for high fall risk patients  - Consider moving patient to room near nurses station  Outcome: Progressing  Goal: Maintain or return to baseline ADL function  Description: INTERVENTIONS:  -  Assess patient's ability to carry out ADLs; assess patient's baseline for ADL function and identify physical deficits which impact ability to perform ADLs (bathing, care of mouth/teeth, toileting, grooming, dressing, etc )  - Assess/evaluate cause of self-care deficits   - Assess range of motion  - Assess patient's mobility; develop plan if impaired  - Assess patient's need for assistive devices and provide as appropriate  - Encourage maximum independence but intervene and supervise when necessary  - Involve family in performance of ADLs  - Assess for home care needs following discharge   - Consider OT consult to assist with ADL evaluation and planning for discharge  - Provide patient education as appropriate  Outcome: Progressing  Goal: Maintains/Returns to pre admission functional level  Description: INTERVENTIONS:  - Perform BMAT or MOVE assessment daily    - Set and communicate daily mobility goal to care team and patient/family/caregiver  - Collaborate with rehabilitation services on mobility goals if consulted  - Perform Range of Motion 3 times a day  - Reposition patient every 2 hours    - Dangle patient 3 times a day  - Stand patient 3 times a day  - Ambulate patient 3 times a day  - Out of bed to chair 3 times a day   - Out of bed for meals 3 times a day  - Out of bed for toileting  - Record patient progress and toleration of activity level   Outcome: Progressing     Problem: DISCHARGE PLANNING  Goal: Discharge to home or other facility with appropriate resources  Description: INTERVENTIONS:  - Identify barriers to discharge w/patient and caregiver  - Arrange for needed discharge resources and transportation as appropriate  - Identify discharge learning needs (meds, wound care, etc )  - Arrange for interpretive services to assist at discharge as needed  - Refer to Case Management Department for coordinating discharge planning if the patient needs post-hospital services based on physician/advanced practitioner order or complex needs related to functional status, cognitive ability, or social support system  Outcome: Progressing     Problem: Knowledge Deficit  Goal: Patient/family/caregiver demonstrates understanding of disease process, treatment plan, medications, and discharge instructions  Description: Complete learning assessment and assess knowledge base    Interventions:  - Provide teaching at level of understanding  - Provide teaching via preferred learning methods  Outcome: Progressing     Problem: Prexisting or High Potential for Compromised Skin Integrity  Goal: Skin integrity is maintained or improved  Description: INTERVENTIONS:  - Identify patients at risk for skin breakdown  - Assess and monitor skin integrity  - Assess and monitor nutrition and hydration status  - Monitor labs   - Assess for incontinence   - Turn and reposition patient  - Assist with mobility/ambulation  - Relieve pressure over bony prominences  - Avoid friction and shearing  - Provide appropriate hygiene as needed including keeping skin clean and dry  - Evaluate need for skin moisturizer/barrier cream  - Collaborate with interdisciplinary team   - Patient/family teaching  - Consider wound care consult   Outcome: Progressing     Problem: MOBILITY - ADULT  Goal: Maintain or return to baseline ADL function  Description: INTERVENTIONS:  -  Assess patient's ability to carry out ADLs; assess patient's baseline for ADL function and identify physical deficits which impact ability to perform ADLs (bathing, care of mouth/teeth, toileting, grooming, dressing, etc )  - Assess/evaluate cause of self-care deficits   - Assess range of motion  - Assess patient's mobility; develop plan if impaired  - Assess patient's need for assistive devices and provide as appropriate  - Encourage maximum independence but intervene and supervise when necessary  - Involve family in performance of ADLs  - Assess for home care needs following discharge   - Consider OT consult to assist with ADL evaluation and planning for discharge  - Provide patient education as appropriate  Outcome: Progressing  Goal: Maintains/Returns to pre admission functional level  Description: INTERVENTIONS:  - Perform BMAT or MOVE assessment daily    - Set and communicate daily mobility goal to care team and patient/family/caregiver  - Collaborate with rehabilitation services on mobility goals if consulted  - Perform Range of Motion 3 times a day  - Reposition patient every 2 hours    - Dangle patient 3 times a day  - Stand patient 3 times a day  - Ambulate patient 3 times a day  - Out of bed to chair 3 times a day   - Out of bed for meals 3 times a day  - Out of bed for toileting  - Record patient progress and toleration of activity level   Outcome: Progressing     Problem: MUSCULOSKELETAL - ADULT  Goal: Maintain or return mobility to safest level of function  Description: INTERVENTIONS:  - Assess patient's ability to carry out ADLs; assess patient's baseline for ADL function and identify physical deficits which impact ability to perform ADLs (bathing, care of mouth/teeth, toileting, grooming, dressing, etc )  - Assess/evaluate cause of self-care deficits   - Assess range of motion  - Assess patient's mobility  - Assess patient's need for assistive devices and provide as appropriate  - Encourage maximum independence but intervene and supervise when necessary  - Involve family in performance of ADLs  - Assess for home care needs following discharge   - Consider OT consult to assist with ADL evaluation and planning for discharge  - Provide patient education as appropriate  Outcome: Progressing  Goal: Maintain proper alignment of affected body part  Description: INTERVENTIONS:  - Support, maintain and protect limb and body alignment  - Provide patient/ family with appropriate education  Outcome: Progressing

## 2022-11-04 NOTE — PLAN OF CARE
Problem: Potential for Falls  Goal: Patient will remain free of falls  Description: INTERVENTIONS:  - Educate patient/family on patient safety including physical limitations  - Instruct patient to call for assistance with activity   - Consult OT/PT to assist with strengthening/mobility   - Keep Call bell within reach  - Keep bed low and locked with side rails adjusted as appropriate  - Keep care items and personal belongings within reach  - Initiate and maintain comfort rounds  - Make Fall Risk Sign visible to staff  - Offer Toileting every 2 Hours, in advance of need  - Initiate/Maintain alarm  - Obtain necessary fall risk management equipment:   - Apply yellow socks and bracelet for high fall risk patients  - Consider moving patient to room near nurses station  Outcome: Progressing     Problem: PAIN - ADULT  Goal: Verbalizes/displays adequate comfort level or baseline comfort level  Description: Interventions:  - Encourage patient to monitor pain and request assistance  - Assess pain using appropriate pain scale  - Administer analgesics based on type and severity of pain and evaluate response  - Implement non-pharmacological measures as appropriate and evaluate response  - Consider cultural and social influences on pain and pain management  - Notify physician/advanced practitioner if interventions unsuccessful or patient reports new pain  Outcome: Progressing     Problem: SAFETY ADULT  Goal: Patient will remain free of falls  Description: INTERVENTIONS:  - Educate patient/family on patient safety including physical limitations  - Instruct patient to call for assistance with activity   - Consult OT/PT to assist with strengthening/mobility   - Keep Call bell within reach  - Keep bed low and locked with side rails adjusted as appropriate  - Keep care items and personal belongings within reach  - Initiate and maintain comfort rounds  - Make Fall Risk Sign visible to staff  - Offer Toileting every 2 Hours, in advance of need  - Initiate/Maintain alarm  - Obtain necessary fall risk management equipment:   - Apply yellow socks and bracelet for high fall risk patients  - Consider moving patient to room near nurses station  Outcome: Progressing  Goal: Maintain or return to baseline ADL function  Description: INTERVENTIONS:  - Educate patient/family on patient safety including physical limitations  - Instruct patient to call for assistance with activity   - Consult OT/PT to assist with strengthening/mobility   - Keep Call bell within reach  - Keep bed low and locked with side rails adjusted as appropriate  - Keep care items and personal belongings within reach  - Initiate and maintain comfort rounds  - Make Fall Risk Sign visible to staff  - Offer Toileting every 2 Hours, in advance of need  - Initiate/Maintain alarm  - Obtain necessary fall risk management equipment:   - Apply yellow socks and bracelet for high fall risk patients  - Consider moving patient to room near nurses station  Outcome: Progressing  Goal: Maintains/Returns to pre admission functional level  Description: INTERVENTIONS:  -  Assess patient's ability to carry out ADLs; assess patient's baseline for ADL function and identify physical deficits which impact ability to perform ADLs (bathing, care of mouth/teeth, toileting, grooming, dressing, etc )  - Assess/evaluate cause of self-care deficits   - Assess range of motion  - Assess patient's mobility; develop plan if impaired  - Assess patient's need for assistive devices and provide as appropriate  - Encourage maximum independence but intervene and supervise when necessary  - Involve family in performance of ADLs  - Assess for home care needs following discharge   - Consider OT consult to assist with ADL evaluation and planning for discharge  - Provide patient education as appropriate  Outcome: Progressing     Problem: DISCHARGE PLANNING  Goal: Discharge to home or other facility with appropriate resources  Description: INTERVENTIONS:  - Identify barriers to discharge w/patient and caregiver  - Arrange for needed discharge resources and transportation as appropriate  - Identify discharge learning needs (meds, wound care, etc )  - Arrange for interpretive services to assist at discharge as needed  - Refer to Case Management Department for coordinating discharge planning if the patient needs post-hospital services based on physician/advanced practitioner order or complex needs related to functional status, cognitive ability, or social support system  Outcome: Progressing     Problem: Knowledge Deficit  Goal: Patient/family/caregiver demonstrates understanding of disease process, treatment plan, medications, and discharge instructions  Description: Complete learning assessment and assess knowledge base    Interventions:  - Provide teaching at level of understanding  - Provide teaching via preferred learning methods  Outcome: Progressing     Problem: MOBILITY - ADULT  Goal: Maintain or return to baseline ADL function  Description: INTERVENTIONS:  - Educate patient/family on patient safety including physical limitations  - Instruct patient to call for assistance with activity   - Consult OT/PT to assist with strengthening/mobility   - Keep Call bell within reach  - Keep bed low and locked with side rails adjusted as appropriate  - Keep care items and personal belongings within reach  - Initiate and maintain comfort rounds  - Make Fall Risk Sign visible to staff  - Offer Toileting every 2 Hours, in advance of need  - Initiate/Maintain alarm  - Obtain necessary fall risk management equipment:   - Apply yellow socks and bracelet for high fall risk patients  - Consider moving patient to room near nurses station  Outcome: Progressing  Goal: Maintains/Returns to pre admission functional level  Description: INTERVENTIONS:  -  Assess patient's ability to carry out ADLs; assess patient's baseline for ADL function and identify physical deficits which impact ability to perform ADLs (bathing, care of mouth/teeth, toileting, grooming, dressing, etc )  - Assess/evaluate cause of self-care deficits   - Assess range of motion  - Assess patient's mobility; develop plan if impaired  - Assess patient's need for assistive devices and provide as appropriate  - Encourage maximum independence but intervene and supervise when necessary  - Involve family in performance of ADLs  - Assess for home care needs following discharge   - Consider OT consult to assist with ADL evaluation and planning for discharge  - Provide patient education as appropriate  Outcome: Progressing     Problem: MUSCULOSKELETAL - ADULT  Goal: Maintain or return mobility to safest level of function  Description: INTERVENTIONS:  - Assess patient's ability to carry out ADLs; assess patient's baseline for ADL function and identify physical deficits which impact ability to perform ADLs (bathing, care of mouth/teeth, toileting, grooming, dressing, etc )  - Assess/evaluate cause of self-care deficits   - Assess range of motion  - Assess patient's mobility  - Assess patient's need for assistive devices and provide as appropriate  - Encourage maximum independence but intervene and supervise when necessary  - Involve family in performance of ADLs  - Assess for home care needs following discharge   - Consider OT consult to assist with ADL evaluation and planning for discharge  - Provide patient education as appropriate  Outcome: Progressing  Goal: Maintain proper alignment of affected body part  Description: INTERVENTIONS:  - Support, maintain and protect limb and body alignment  - Provide patient/ family with appropriate education  Outcome: Progressing

## 2022-11-04 NOTE — OCCUPATIONAL THERAPY NOTE
Occupational Therapy Evaluation     Patient Name: Ewa Winchester  FSYDZ'Y Date: 11/4/2022  Problem List  Principal Problem:    Periprosthetic fracture of knee    Past Medical History  History reviewed  No pertinent past medical history  Past Surgical History  Past Surgical History:   Procedure Laterality Date    ORIF PERIPROSTHETIC KNEE Right 11/3/2022    Procedure: OPEN REDUCTION W/ INTERNAL FIXATION (ORIF) PERIPROSTHETIC KNEE;  Surgeon: Suzette Martinez MD;  Location: [facility];  Service: Orthopedics           11/04/22 0922   OT Last Visit   OT Visit Date 11/04/22   Note Type   Note type Evaluation   Pain Assessment   Pain Assessment Tool 0-10   Pain Score 5   Pain Location/Orientation Orientation: Right;Location: Leg   Hospital Pain Intervention(s) Repositioned; Ambulation/increased activity   Restrictions/Precautions   Weight Bearing Precautions Per Order Yes   RLE Weight Bearing Per Order (S)  NWB   Other Precautions WBS; Fall Risk;Pain  (hemovac drain)   Home Living   Type of [address] [place] [address] Multi-level;Bed/bath upstairs; Able to live on main level with bedroom/bathroom  (split level; 1STE)   Bathroom Shower/Tub Walk-in shower   Bathroom Toilet Standard   Bathroom Equipment Shower chair   [address] Walker;Cane   Additional Comments pt reports PTA he slept in recliner downstairs and plans to sleep there once discharged home   Prior Function   Level of [place] Independent with ADLs; Independent with functional mobility; Independent with IADLS   Lives With Spouse  (pt reports wife would not be able to assist pt much due to wifes physical condition)   Receives Help From Family   IADLs Independent with driving; Independent with meal prep; Independent with medication management   Falls in the last 6 months 1 to 4   Vocational Retired   Comments pt reports being independent in ADLs/IADLs and functional mobility with occassional use of SPC for long distances; +driving   Lifestyle   Autonomy pt reports being independent in ADLs/IADLs and functional mobility with occassional use of SPC for long distances; +driving   Reciprocal Relationships lives with supportive spouse who is retired and able to be home with pt   Service to Others retired: construction   Intrinsic Gratification enjoys fishing   ADL   Eating Assistance 7  3 [facility] 7  [address] [facility] 5  [address] [place] [address] Dr 3  Moderate Assistance   [address] 5  Supervision/Setup    [facility] [facility] [address] 3  Moderate Assistance   [address]  3  [address] [facility] 3  Moderate Assistance   Bed Mobility   Supine to Sit 3  Moderate assistance   Additional items Assist x 1; Increased time required;LE management   Additional Comments pt was left in bedside chair with all needs within reach   Transfers   Sit to Stand 3  Moderate assistance   Additional items Assist x 1; Increased time required   Stand to Sit 3  Moderate assistance   Additional items Assist x 1; Increased time required   Functional Mobility   Functional Mobility 3  Moderate assistance   Additional Comments x1   Additional items Rolling walker   Balance   Static Sitting Fair -   Static Standing Poor   Ambulatory Poor -   Activity Tolerance   Activity Tolerance Patient limited by pain   Medical Staff Made Aware PT co treatment due to medical complexity and overall limited activity tolerance   Nurse Made Aware per nursing pt was appropriate to be seen   RUE Assessment   RUE Assessment WFL   LUE Assessment   LUE Assessment WFL   Psychosocial   Psychosocial (WDL) WDL   Cognition   Overall Cognitive Status WFL   Arousal/Participation Alert; Responsive; Cooperative   Attention Within functional limits   Orientation Level Oriented X4   Memory Within functional limits   Following Commands Follows all commands and directions without difficulty   Comments pt was overall pleasant and cooperative   Assessment   Limitation Decreased ADL status; Decreased Safe judgement during ADL;Decreased endurance;Decreased self-care trans;Decreased high-level ADLs   Prognosis Good   Assessment Pt is a 77 y o  male being seen for an initial Occupational Therapy Evaluation following admission to [facility] after slipping and falling off embankment while fishing presenting with R leg pain  Pt dx includes periprosthetic fracture of knee  Pt s/p OPEN REDUCTION W/ INTERNAL FIXATION (ORIF) PERIPROSTHETIC KNEE (Right) on 11/03/2022  Per orthopedics [facility] in RLE  Pt  has no past medical history on file  Pt lives in a split level home with his supportive wife  PTA pt reports being independent in ADLs/IADLs and functional mobility with occasional use of SPC for long distances  Pt requires overall Mod A for bed mobility, transfers and functional mobility with RW  Pt also requires supervision for UB dressing/bathing and Mod A for LB dressing/bathing  Pt is presenting with the following limitations pain, fall risk, decreased endurance/activity tolerance, increased assistance with ADLs at this time, WBS in RLE and decreased balance  Pt was educated on current WBS and compensatory strategies for LB dressing for carryover once discharged  At the end of the session pt was left in bedside chair with all needs within reach and denied any further questions or concerns at this time  The patient's raw score on the AM-PAC Daily Activity inpatient short form is 17, standardized score is 37 26, less than 39 4  Patients at this level are likely to benefit from discharge to post-acute rehabilitation services  Please refer to the recommendation of the Occupational Therapist for safe discharge planning  From an OT perspective pt would benefit from additional OT services in an inpatient rehab setting  Will continue to follow and treat pt while in the hospital to achieve goals     Goals   Patient Goals to get better   LTG Time Frame 10-14   Long Term Goal see goals below   Plan   Treatment Interventions ADL retraining;Functional transfer training; Endurance training;Patient/family training;Equipment evaluation/education; Compensatory technique education;Continued evaluation; Energy conservation; Activityengagement   Goal Expiration Date 11/18/22   OT Frequency 2-3x/wk   Recommendation   OT Discharge Recommendation Post acute rehabilitation services   AM-PAC Daily Activity Inpatient   Lower Body Dressing 2   Bathing 2   Toileting 2   Upper Body Dressing 3   Grooming 4   Eating 4   Daily Activity Raw Score 17   Daily Activity Standardized Score (Calc for Raw Score >=11) 37 26   AM-PAC Applied Cognition Inpatient   Following a Speech/Presentation 4   Understanding Ordinary Conversation 4   Taking Medications 4   Remembering Where Things Are Placed or Put Away 4   Remembering List of 4-5 Errands 4   Taking Care of Complicated Tasks 4   Applied Cognition Raw Score 24   Applied Cognition Standardized Score 62 21     Pt will complete bed mobility with Mod I  Pt will complete functional transfers including toilet transfer with Mod I using DME as needed within 2 weeks  Pt will be Mod I with functional mobility for household distances using DME as needed within 2 weeks  Pt will complete UB and LB bathing with Mod I using compensatory/adaptive strategies within 2 weeks  Pt will complete UB and LB dressing with Mod I using compensatory/adaptive strategies within 2 weeks  Pt will complete an IADL task with Mod I while standing for 10 min to improve activity tolerance using DME as needed within 2 weeks  Pt will recall and demonstrate good carryover of WBS to assist with transfer once discharged home       Electa Due, OTS

## 2022-11-04 NOTE — QUICK NOTE
S: Tony Hutchison is a 77 y o  male who returns to the floor s/p ORIF periprostehtic knee  EBL 200cc  Offers no complaints  States that pain is well controlled  Wants Silvia to win the game       O:    Vitals:    11/03/22 1912   BP:    Pulse: 97   Resp:    Temp: 98 9 °F (37 2 °C)   SpO2: 92%       General: Resting comfortably, nad, non-toxic appearing  HEENT: normocephalic, atraumatic, perrla, neck supple, no tracheal deviation, mmm  Cardiac: rrr, no m/r/g, 2+ radial and DP pulses b/l  Pulm: lungs cta, no w/r/r  Abd: soft, nontender, nondistended  : Deferred  MSK: Extremities well perfused  Neuro: gcs 15   Skin: lines c/d/i    A:  Periprostehtic fracture of knee and distal femur s/p ORIF    P:  Pain control  PT/OT  Ortho consulted, appreciate recs  See progress note for further discussion

## 2022-11-04 NOTE — PROGRESS NOTES
Progress Note - Orthopedics   Kathleen Flattery 77 y o  male MRN: [id number]  Unit/Bed#: [facility] 628-01      Subjective:    77 y  o male  No acute events, no complaints  Pt doing well  Pain controlled   Denies fevers chills, CP, SOB    Labs:  0   Lab Value Date/Time    HCT 28 4 (L) 11/03/2022 2130    HCT 37 9 11/02/2022 1444    HGB 9 1 (L) 11/03/2022 2130    HGB 12 3 11/02/2022 1444    INR 1 01 11/02/2022 1444    WBC 8 43 11/03/2022 2130    WBC 13 20 (H) 11/02/2022 1444       Meds:    Current Facility-Administered Medications:   •  acetaminophen (TYLENOL) tablet 975 mg, 975 mg, Oral, Q8H [address], Lavell Davis MD, 975 mg at 11/04/22 0601  •  aspirin (ECOTRIN LOW STRENGTH) EC tablet 81 mg, 81 mg, Oral, Daily, Lavell Davis MD  •  atorvastatin (LIPITOR) tablet 40 mg, 40 mg, Oral, Daily With Viky Cisneros MD, 40 mg at 11/03/22 1709  •  docusate sodium (COLACE) capsule 100 mg, 100 mg, Oral, BID, Lavell Davis MD, 100 mg at 11/03/22 1709  •  enoxaparin (LOVENOX) subcutaneous injection 30 mg, 30 mg, Subcutaneous, Q12H [address], Lavell Davis MD, 30 mg at 11/03/22 2143  •  ezetimibe (ZETIA) tablet 10 mg, 10 mg, Oral, HS, Lavell Davis MD, 10 mg at 11/03/22 2144  •  lactated ringers bolus 1,000 mL, 1,000 mL, Intravenous, Once PRN **AND** lactated ringers bolus 1,000 mL, 1,000 mL, Intravenous, Once PRN, Lavell Davis MD  •  lactated ringers infusion, 75 mL/hr, Intravenous, Continuous, Lavell Davis MD, Last Rate: 75 mL/hr at 11/04/22 0628, 75 mL/hr at 11/04/22 8390  •  metoprolol tartrate (LOPRESSOR) partial tablet 12 5 mg, 12 5 mg, Oral, Q12H [address], Lavell Davis MD, 12 5 mg at 11/02/22 2305  •  multivitamin-minerals (CENTRUM) tablet 1 tablet, 1 tablet, Oral, Daily, Lavell Davis MD, 1 tablet at 11/03/22 0854  •  naloxone (NARCAN) 0 04 mg/mL syringe 0 04 mg, 0 04 mg, Intravenous, Q1MIN PRN, Lavell Davis MD  •  ondansetron [facility]) injection 4 mg, 4 mg, Intravenous, Q6H PRN, Derick Cannon MD  •  oxyCODONE (ROXICODONE) IR tablet 2 5 mg, 2 5 mg, Oral, Q4H PRN, Derick Cannon MD  •  oxyCODONE (ROXICODONE) IR tablet 5 mg, 5 mg, Oral, Q4H PRN, Derick Cannon MD, 5 mg at 11/03/22 0463  •  rOPINIRole (REQUIP) tablet 1 mg, 1 mg, Oral, HS, Derick Cannon MD, 1 mg at 11/03/22 2144  •  sodium chloride 0 9 % bolus 1,000 mL, 1,000 mL, Intravenous, Once PRN **AND** sodium chloride 0 9 % bolus 1,000 mL, 1,000 mL, Intravenous, Once PRN, Derick Cannon MD  •  tamsulosin [facility]) capsule 0 4 mg, 0 4 mg, Oral, Daily With Mike Ann MD, 0 4 mg at 11/03/22 1709    Blood Culture:   No results found for: BLOODCX    Wound Culture:   No results found for: WOUNDCULT    Ins and Outs:  I/O last 24 hours: In: 2432 [P O :480; I V :2040; IV Piggyback:50]  Out: 2001 [Urine:1765; Drains:36; Blood:200]          Physical:  Vitals:    11/04/22 0715   BP: 103/63   Pulse: 93   Resp: 17   Temp: 97 9 °F (36 6 °C)   SpO2: 95%     Musculoskeletal: right Lower Extremity  · Skin intact around dressing   · Dressing clean dry and intact   · TTP mildly about knee  · Drain in place   · SILT s/s/sp/dp/t  +fhl/ehl, +ankle dorsi/plantar flexion  · 2+ DP pulse    Assessment:    77 y  o male POD 1 ORIF of right distal femur periprosthetic fracture doing well      Plan:  · NWB RLE     · Knee range of motion approved and encouraged   · Greater than 2 gram drop which qualifies for diagnosis of acute blood loss anemia, will monitor and administer IVF/prbc as indicated   · PT/OT  · Pain control  · DVT ppx 28 days post op per primary   · Dispo: Ortho will follow     Derick Cannon MD

## 2022-11-04 NOTE — PHYSICAL THERAPY NOTE
PHYSICAL THERAPY EVALUATION  NAME:  Sebastian Feng  DATE: 11/04/22    AGE:   77 y o  Mrn:   [id number]  ADMIT DX:  Unspecified multiple injuries, initial encounter [T07  XXXA]  Periprosthetic fracture of knee [id number]  [id number]]    History reviewed  No pertinent past medical history  History reviewed  No pertinent surgical history  Length Of Stay: 2    PHYSICAL THERAPY EVALUATION:        11/04/22 5266   Note Type   Note type Evaluation   Pain Assessment   Pain Assessment Tool 0-10   Pain Score 5   Pain Location/Orientation Orientation: Right;Location: Leg   Pain Onset/Description Onset: Ongoing;Frequency: Constant/Continuous; Descriptor: Aching   Effect of Pain on Daily Activities increased pain with activity   Patient's Stated Pain Goal No pain   Hospital Pain Intervention(s) Ambulation/increased activity;Repositioned   Restrictions/Precautions   Weight Bearing Precautions Per Order Yes   RLE Weight Bearing Per Order (S)  NWB   Other Precautions WBS; Multiple lines; Fall Risk;Pain   Home Living   Type of Home House  (split level home)   Home Layout Multi-level; Able to live on main level with bedroom/bathroom;Stairs to enter with rails  ([address])   Home Equipment Walker;Cane   Additional Comments Patient reports living with spouse who is able to provide some assistance to him if needed   Pt states she would not be able to physically assist him much   Prior Function   Level of [place] Independent with functional mobility   Lives With Spouse   Receives Help From [facility] in the last 6 months 1 to 4  (1- reason for this admission)   Comments Patient denies use of an assistive device for ambulation prior to admission   General   Family/Caregiver Present No   Cognition   Overall Cognitive Status WFL   Arousal/Participation Alert   Orientation Level Oriented X4   Memory Within functional limits   Following Commands Follows all commands and directions without difficulty   RUE Assessment   RUE Assessment WFL   LUE Assessment   LUE Assessment WFL   RLE Assessment   RLE Assessment X  (AROM limited by pain and weakness)   Strength RLE   RLE Overall Strength 2+/5   LLE Assessment   LLE Assessment WFL   Strength LLE   LLE Overall Strength 4/5   Bed Mobility   Supine to Sit 3  Moderate assistance   Additional items Assist x 1; Increased time required;Verbal cues   Transfers   Sit to Stand 3  Moderate assistance   Additional items Assist x 1; Increased time required;Verbal cues   Stand to Sit 3  Moderate assistance   Additional items Assist x 1; Increased time required;Verbal cues   Additional Comments VC and TC needed for hand placement during transfers   Ambulation/Elevation   Gait pattern Short stride; Foward flexed  (hop to gait pattern)   Gait Assistance 3  Moderate assist   Additional items Assist x 1   Assistive Device Bariatric Rolling walker   Distance 3ft   Balance   Static Sitting Fair -   Static Standing Poor +   Ambulatory Poor   Endurance Deficit   Endurance Deficit Yes   Endurance Deficit Description fatigue, pain   Activity Tolerance   Activity Tolerance Patient limited by fatigue;Patient limited by pain   Medical Staff Made Aware Phuong OT; Genna Mandel OT student; OT present for co evaluation due to pts current medical presentation   Nurse Made Aware Pt appropriate to be seen and mobilize per nsg   Assessment   Prognosis Good   Problem List Decreased strength;Decreased range of motion;Decreased endurance; Impaired balance;Decreased mobility;Pain;Orthopedic restrictions   Assessment Pt is 77 y o  male seen for PT evaluation s/p admit to [facility] on 11/2/2022  Two pt identifiers were used to confirm  Pt presented s/p slip and fall down and embankment resulting in RLE pain  Patient originally presented at [facility] and was transferred to [facility] for further medical management/ evaluation  Pt was admitted with a primary dx of:  Right distal femur periprosthetic fracture    Patient underwent OPEN REDUCTION W/ INTERNAL FIXATION (ORIF) PERIPROSTHETIC KNEE (Right) which was performed on 11/3/2022  PT now consulted for assessment of mobility and d/c needs  Pt with Up as tolerated orders  Pts current co morbidities affecting treatment include:  No past medical history on file, and personal factors including steps to manage at home  Pts current clinical presentation is Unstable/ Unpredictable (high complexity) due to Ongoing medical management for primary dx, Increased reliance on more restrictive AD compared to baseline, Decreased activity tolerance compared to baseline, Fall risk, Increased assistance needed from caregiver at current time, Current WBS, Continuous pulse oximetry monitoring , s/p surgical intervention    Upon evaluation, pt currently is requiring Mod Ax1 for bed mobility; Mod Ax1 for transfers and Mod Ax1 for ambulation w/ RW  Pt presents at PT eval functioning below baseline and currently w/ overall mobility deficits 2* to: BLE weakness, decreased ROM, impaired balance, decreased endurance, gait deviations, pain, decreased activity tolerance compared to baseline, fall risk, orthopedic restrictions  Pt currently at a fall risk 2* to impairments listed above  Based on the aforementioned PT evaluation, pt will continue to benefit from skilled Acute PT interventions to address stated impairments; to maximize functional mobility; for ongoing pt/ family training; and DME needs  At conclusion of PT session pt returned back in chair with phone and call bell within reach  Pt denies any further questions at this time  PT is currently recommending Rehab  PT will continue to follow during hospital stay  Goals   Patient Goals " to get better"   STG Expiration Date 11/14/22   Short Term Goal #1 In 10 days pt will complete: 1) Bed mobility skills with S to increase safety and independence as well as decrease caregiver burden  2) Functional transfers with S to promote increased independence, safety, and QOL   3) Ambulate 22' using least restrictive AD with S without LOB and stable vitals so that pt can negotiate previous living environment safely and promote independence with functional mobility and return to PLOF  4) Improve balance grades by 1/2 grade to increase safety with all mobility and decrease fall risk  5) Improve BLE strength by 1/2 grade to help increase overall functional mobility and decrease fall risk  Plan   Treatment/Interventions Functional transfer training;LE strengthening/ROM; Therapeutic exercise; Endurance training;Patient/family training;Equipment eval/education; Bed mobility;Gait training;Spoke to nursing;OT   PT Frequency 3-5x/wk   Recommendation   PT Discharge Recommendation Post acute rehabilitation services   Equipment Recommended Walker; Wheelchair   Wheelchair Package Recommended Heavy Duty (pt wt 250 lbs+)   Walker Package Recommended HD Bariatric wheeled walker   [address] Mobility Inpatient   Turning in Bed Without Bedrails 2   Lying on Back to Sitting on Edge of Flat Bed 2   Moving Bed to Chair 2   Standing Up From Chair 2   Walk in Room 1   Climb 3-5 Stairs 1   Basic Mobility Inpatient Raw Score 10   Turning Head Towards Sound 4   Follow Simple Instructions 4   Low Function Basic Mobility Raw Score 18   Low Function Basic Mobility Standardized Score 29 25   Highest Level Of Mobility   -HLM Goal 4: Move to chair/commode   -HLM Achieved 4: Move to chair/commode   Modified Madeline Scale   Modified Mohan Scale 4   Barthel Index   Feeding 10   Bathing 0   Grooming Score 0   Dressing Score 5   Bladder Score 10   Bowels Score 10   Toilet Use Score 5   Transfers (Bed/Chair) Score 5   Mobility (Level Surface) Score 0   Stairs Score 0   Barthel Index Score 45   Portions of the documentation may have been created using voice recognition software  Occasional wrong word or sound alike substitutions may have occurred due to the inherent limitations of the voice recognition software   Read the chart carefully and recognize, using context, where substitutions have occurred      Timmy Paz DPT

## 2022-11-04 NOTE — PLAN OF CARE
Problem: PHYSICAL THERAPY ADULT  Goal: Performs mobility at highest level of function for planned discharge setting  See evaluation for individualized goals  Description: Treatment/Interventions: Functional transfer training, LE strengthening/ROM, Therapeutic exercise, Endurance training, Patient/family training, Equipment eval/education, Bed mobility, Gait training, Spoke to nursing, OT  Equipment Recommended: Divine Jimenez, Wheelchair       See flowsheet documentation for full assessment, interventions and recommendations  Note: Prognosis: Good  Problem List: Decreased strength, Decreased range of motion, Decreased endurance, Impaired balance, Decreased mobility, Pain, Orthopedic restrictions  Assessment: Pt is 77 y o  male seen for PT evaluation s/p admit to [facility] on 11/2/2022  Two pt identifiers were used to confirm  Pt presented s/p slip and fall down and embankment resulting in RLE pain  Patient originally presented at [facility] and was transferred to [facility] for further medical management/ evaluation  Pt was admitted with a primary dx of:  Right distal femur periprosthetic fracture  Patient underwent OPEN REDUCTION W/ INTERNAL FIXATION (ORIF) PERIPROSTHETIC KNEE (Right) which was performed on 11/3/2022  PT now consulted for assessment of mobility and d/c needs  Pt with Up as tolerated orders  Pts current co morbidities affecting treatment include:  No past medical history on file, and personal factors including steps to manage at home  Pts current clinical presentation is Unstable/ Unpredictable (high complexity) due to Ongoing medical management for primary dx, Increased reliance on more restrictive AD compared to baseline, Decreased activity tolerance compared to baseline, Fall risk, Increased assistance needed from caregiver at current time, Current WBS, Continuous pulse oximetry monitoring , s/p surgical intervention      Upon evaluation, pt currently is requiring Mod Ax1 for bed mobility; Mod Ax1 for transfers and Mod Ax1 for ambulation w/ RW  Pt presents at PT eval functioning below baseline and currently w/ overall mobility deficits 2* to: BLE weakness, decreased ROM, impaired balance, decreased endurance, gait deviations, pain, decreased activity tolerance compared to baseline, fall risk, orthopedic restrictions  Pt currently at a fall risk 2* to impairments listed above  Based on the aforementioned PT evaluation, pt will continue to benefit from skilled Acute PT interventions to address stated impairments; to maximize functional mobility; for ongoing pt/ family training; and DME needs  At conclusion of PT session pt returned back in chair with phone and call bell within reach  Pt denies any further questions at this time  PT is currently recommending Rehab  PT will continue to follow during hospital stay  PT Discharge Recommendation: Post acute rehabilitation services    See flowsheet documentation for full assessment

## 2022-11-04 NOTE — PROGRESS NOTES
Progress Note - Acute Pain Service    Ewa Winchester 77 y o  male MRN: [id number]  Unit/Bed#: [facility] 628-01 Encounter: [id number]      Assessment:   Principal Problem:    Periprosthetic fracture of knee    Ewa Winchester is a 77 y o  male who originally presented as a transfer from [facility] on 11/2 if there is slip and fall off an embankment while fishing  Patient was found to have right knee periprosthetic fracture  He is now s/p ORIF of right-sided knee periprosthetic fracture with Orthopedics on 11/03  Preoperatively he received right-sided single shot femoral nerve block with local anesthetic for postoperative pain  Acute pain service was consulted for postoperative block follow up  Patient seen and examined this morning, sitting up in chair after working with physical therapy  Pain has been well controlled to his RLE since surgery yesterday, however he does report some increased pain since working with physical therapy this morning  Opioid medications have been used sparing, patient has just received his first dose of Oxycodone 2 5 mg  He does also report some an increase in his chronic low back pain since his fall  He dose report some weakness to his RLE secondary to his block however it has been resolving  Patient is cleared for discharge from a pain management standpoint  Plan:   Right-sided single shot femoral nerve block with local anesthetic is functioning appropriately, patient does report some mild sensory deficit the medial aspect of his right knee as well as some mild motor deficit which has been resolving  Pain continues to be well controlled      · Oxycodone 2 5 mg every 4 hours as needed, for moderate  · Oxycodone 5 mg every 4 hours as needed for severe pain    Multimodal analgesia:  · Tylenol 975 mg every 8 hours scheduled  · Recommend Robaxin 500 mg every 8 hours, for muscle spasms    Bowel Regimen:  Colace 100 mg twice daily    Treatment plan discussed with bedside nursing as well as primary care team   APS will sign off at this time  Thank you for the consult  All opioids and other analgesics to be written at discretion of primary team  Please contact Acute Pain Service - [facility] via commerc[employer] from [phone] with additional questions or concerns  See Livia or Mary for additional contacts and after hours information  Pain History  Current pain location(s):  Right knee  Pain Scale:   5  Quality:  Aching  24 hour history:  Acute events overnight  Patient continues to report adequate pain control to right lower extremity, however does report some increased pain levels after working with physical therapy this morning  Has used minimal opioid pain medication  Tolerating current medication regimen no complaints of shortness of breath, nausea/vomiting, constipation/diarrhea  Patient remains hemodynamically stable      Opioid requirement previous 24 hours:   Oxycodone 2 5 mg  1 5 mg IV Dilaudid  100 mcg IV Fentanyl    Meds/Allergies   all current active meds have been reviewed, current meds:   Current Facility-Administered Medications   Medication Dose Route Frequency   • acetaminophen (TYLENOL) tablet 975 mg  975 mg Oral Q8H [facility]   • aspirin (ECOTRIN LOW STRENGTH) EC tablet 81 mg  81 mg Oral Daily   • atorvastatin (LIPITOR) tablet 40 mg  40 mg Oral Daily With Dinner   • docusate sodium (COLACE) capsule 100 mg  100 mg Oral BID   • enoxaparin (LOVENOX) subcutaneous injection 30 mg  30 mg Subcutaneous Q12H [facility]   • ezetimibe (ZETIA) tablet 10 mg  10 mg Oral HS   • lactated ringers bolus 1,000 mL  1,000 mL Intravenous Once PRN    And   • lactated ringers bolus 1,000 mL  1,000 mL Intravenous Once PRN   • lactated ringers infusion  75 mL/hr Intravenous Continuous   • metoprolol tartrate (LOPRESSOR) partial tablet 12 5 mg  12 5 mg Oral Q12H [facility]   • multivitamin-minerals (CENTRUM) tablet 1 tablet  1 tablet Oral Daily   • naloxone (NARCAN) 0 04 mg/mL syringe 0 04 mg  0 04 mg Intravenous Q1MIN PRN • ondansetron (ZOFRAN) injection 4 mg  4 mg Intravenous Q6H PRN   • oxyCODONE (ROXICODONE) IR tablet 2 5 mg  2 5 mg Oral Q4H PRN   • oxyCODONE (ROXICODONE) IR tablet 5 mg  5 mg Oral Q4H PRN   • rOPINIRole (REQUIP) tablet 1 mg  1 mg Oral HS   • sodium chloride 0 9 % bolus 1,000 mL  1,000 mL Intravenous Once PRN    And   • sodium chloride 0 9 % bolus 1,000 mL  1,000 mL Intravenous Once PRN   • tamsulosin (FLOMAX) capsule 0 4 mg  0 4 mg Oral Daily With Dinner    and PTA meds:   None       No Known Allergies    Objective     Temp:  [97 3 °F (36 3 °C)-98 9 °F (37 2 °C)] 97 9 °F (36 6 °C)  HR:  [] 93  Resp:  [17-22] 17  BP: ()/() 103/63    Physical Exam  Vitals reviewed  Constitutional:       General: He is not in acute distress  Appearance: He is not ill-appearing or toxic-appearing  HENT:      Head: Normocephalic and atraumatic  Nose: Nose normal  No congestion or rhinorrhea  Eyes:      Extraocular Movements: Extraocular movements intact  Cardiovascular:      Rate and Rhythm: Normal rate  Pulmonary:      Effort: Pulmonary effort is normal  No tachypnea, bradypnea or respiratory distress  Abdominal:      General: Abdomen is flat  There is no distension  Palpations: Abdomen is soft  Tenderness: There is no abdominal tenderness  Musculoskeletal:         General: Swelling (R knee) and tenderness (R knee) present  Normal range of motion  Cervical back: Normal range of motion  Right lower leg: No edema  Left lower leg: No edema  Comments: Ace wrap to RLE   Skin:     General: Skin is warm and dry  Coloration: Skin is not pale  Findings: No rash  Neurological:      Mental Status: He is alert and oriented to person, place, and time  Mental status is at baseline  Sensory: No sensory deficit  Motor: Weakness (RLE; resolving) present     Psychiatric:         Attention and Perception: Attention normal          Mood and Affect: Mood normal  Speech: Speech normal          Behavior: Behavior normal  Behavior is cooperative  Lab Results:   Results from last 7 days   Lab Units 11/04/22  0559   WBC Thousand/uL 11 79*   HEMOGLOBIN g/dL 8 2*   HEMATOCRIT % 25 6*   PLATELETS Thousands/uL 176      Results from last 7 days   Lab Units 11/04/22  0528   POTASSIUM mmol/L 4 7   CHLORIDE mmol/L 107   CO2 mmol/L 30   BUN mg/dL 20   CREATININE mg/dL 0 86   CALCIUM mg/dL 8 1*       Imaging Studies: I have personally reviewed pertinent reports  Please note that the APS provides consultative services regarding pain management only  With the exception of ketamine and epidural infusions and except when indicated, final decisions regarding starting or changing doses of analgesic medications are at the discretion of the consulting service  Off hours consultation and/or medication management is generally not available      Bina Acosta  Acute Pain Service

## 2022-11-04 NOTE — PLAN OF CARE
Problem: OCCUPATIONAL THERAPY ADULT  Goal: Performs self-care activities at highest level of function for planned discharge setting  See evaluation for individualized goals  Description: Treatment Interventions: ADL retraining, Functional transfer training, Endurance training, Patient/family training, Equipment evaluation/education, Compensatory technique education, Continued evaluation, Energy conservation, Activityengagement          See flowsheet documentation for full assessment, interventions and recommendations  Note: Limitation: Decreased ADL status, Decreased Safe judgement during ADL, Decreased endurance, Decreased self-care trans, Decreased high-level ADLs  Prognosis: Good  Assessment: Pt is a 77 y o  male being seen for an initial Occupational Therapy Evaluation following admission to [facility] after slipping and falling off embankment while fishing presenting with R leg pain  Pt dx includes periprosthetic fracture of knee  Pt s/p OPEN REDUCTION W/ INTERNAL FIXATION (ORIF) PERIPROSTHETIC KNEE (Right) on 11/03/2022  Per orthopedics NWB in RLE  Pt  has no past medical history on file  Pt lives in a split level home with his supportive wife  PTA pt reports being independent in ADLs/IADLs and functional mobility with occasional use of SPC for long distances  Pt requires overall Mod A for bed mobility, transfers and functional mobility with RW  Pt also requires supervision for UB dressing/bathing and Mod A for LB dressing/bathing  Pt is presenting with the following limitations pain, fall risk, decreased endurance/activity tolerance, increased assistance with ADLs at this time, WBS in RLE and decreased balance  Pt was educated on current WBS and compensatory strategies for LB dressing for carryover once discharged  At the end of the session pt was left in bedside chair with all needs within reach and denied any further questions or concerns at this time   The patient's raw score on the AM-PAC Daily Activity inpatient short form is 17, standardized score is 37 26, less than 39 4  Patients at this level are likely to benefit from discharge to post-acute rehabilitation services  Please refer to the recommendation of the Occupational Therapist for safe discharge planning  From an OT perspective pt would benefit from additional OT services in an inpatient rehab setting  Will continue to follow and treat pt while in the hospital to achieve goals       OT Discharge Recommendation: Post acute rehabilitation services     Marlene Gallegos OTS

## 2022-11-04 NOTE — PROGRESS NOTES
[facility]  Progress Note - Tony Hutchison 1945, 77 y o  male MRN: [id number]  Unit/Bed#: [facility] 628-01 Encounter: [id number]  Primary Care Provider: Melissa Ngo MD   Date and time admitted to hospital: 11/2/2022  8:59 PM    * Periprosthetic fracture of knee  Assessment & Plan  - orthopedic recommendations s/p Right ORIF:   - Non weight bearing  right lower extremity   - clear for discharge, will follow up in 2 weeks with Ortho  - APS recommendations:   - Right-sided single shot femoral nerve block with local anesthetic is functioning appropriately, patient does report some mild sensory deficit the medial aspect of his right knee as well as some mild motor deficit which has been resolving  Pain continues to be well controlled  - Tylenol 975 mg every 8 hours scheduled   - Robaxin 500 mg every 8 hours, for muscle spasms   - Oxycodone 2 5 mg every 4 hours as needed, for moderate pain   - Oxycodone 5 mg every 4 hours as needed for severe pain    - Colace 100 mg twice daily  - PT recommended Acute Rehab, placement pending  - medically appropriate for discharge once accepted        Bowel Regimen: Yes  VTE Prophylaxis:Sequential compression device (Venodyne)  and Enoxaparin (Lovenox)     Disposition: acute rehab, placement pending    Subjective   Chief Complaint: right knee pain and swelling    Subjective: NAEO  Tolerating regular diet  No new complaints this morning  Objective   Vitals:   Temp:  [97 6 °F (36 4 °C)-98 9 °F (37 2 °C)] 97 6 °F (36 4 °C)  HR:  [] 87  Resp:  [17-22] 17  BP: ()/() 109/62    I/O       11/02 0701  11/03 0700 11/03 0701  11/04 0700 11/04 0701  11/05 0700    P  O   480     I V  (mL/kg)  2040 (15 6) 1138 8 (8 7)    IV Piggyback  50     Total Intake(mL/kg)  2570 (19 6) 1138 8 (8 7)    Urine (mL/kg/hr) 250 1765 (0 6) 900 (1)    Drains  36     Blood  200     Total Output 250 2001 900    Net -250 +569 +238 8                  Physical Exam:   GENERAL APPEARANCE:  Sitting up in bed, appears comfortable, no acute distress  NEURO:  Alert and orient x3  HEENT:  Normocephalic, atraumatic  CV:  Regular rate and rhythm  LUNGS:  Breathing comfortably on room air, clear to auscultation bilaterally  GI:  Soft, nondistended  :  Wade in place, clear yellow urine  MSK:  Right lower extremity wrapped, drain in place with 36 cc serosanguineous output, 2+ DP pulses bilaterally, right lower extremity distal sensation and motor intact  SKIN:  Warm and dry    Invasive Devices  Report    Peripheral Intravenous Line  Duration           Peripheral IV 11/02/22 Distal;Right;Upper;Ventral (anterior) Arm 1 day    Peripheral IV 11/03/22 Left Hand 1 day                      Lab Results:   BMP/CMP:   Lab Results   Component Value Date    SODIUM 140 11/04/2022    K 4 7 11/04/2022     11/04/2022    CO2 30 11/04/2022    BUN 20 11/04/2022    CREATININE 0 86 11/04/2022    CALCIUM 8 1 (L) 11/04/2022    EGFR 84 11/04/2022    and CBC:   Lab Results   Component Value Date    WBC 11 79 (H) 11/04/2022    HGB 8 2 (L) 11/04/2022    HCT 25 6 (L) 11/04/2022    MCV 93 11/04/2022     11/04/2022    MCH 29 8 11/04/2022    MCHC 32 0 11/04/2022    RDW 13 1 11/04/2022    MPV 9 4 11/04/2022     Imaging: I have personally reviewed pertinent reports     and I have personally reviewed pertinent films in PACS   Other Studies: N/A

## 2022-11-04 NOTE — CASE MANAGEMENT
Case Management Discharge Planning Note    Patient name Ewa Winchester  Location [facility] 6[facility] 540-15 MRN [id number]  : 1945 Date 2022       Current Admission Date: 2022  Current Admission Diagnosis:Periprosthetic fracture of knee   Patient Active Problem List    Diagnosis Date Noted   • Periprosthetic fracture of knee 2022      LOS (days): 2  Geometric Mean LOS (GMLOS) (days): 2 70  Days to GMLOS:1     OBJECTIVE:  Risk of Unplanned Readmission Score: 12 42         Current admission status: Inpatient   Preferred Pharmacy:   [facility] [address] [address], [address] [place] [id number] [address]  Phone: [phone] Fax: [phone]    Primary Care Provider: Memo Conteh MD    Primary Insurance: MEDICARE  Secondary Insurance: [facility]    DISCHARGE DETAILS:    Discharge planning discussed with[de-identified] Patient and spouse at bedside  Freedom of Choice: Yes   Comments - Freedom of Choice: Therapy recommendations for STR discussed with patient and spouse at bedside  Pt and spouse spoke interest in blanket referral's to local SNF's within pts zipcode area  CM contacted family/caregiver?: Yes  Were Treatment Team discharge recommendations reviewed with patient/caregiver?: Yes  Did patient/caregiver verbalize understanding of patient care needs?: Yes  Were patient/caregiver advised of the risks associated with not following Treatment Team discharge recommendations?: Yes    Contacts  Patient Contacts: Suzanna Plummer-Spouse  Relationship to Patient[de-identified] Family  Contact Method: In Person    Other Referral/Resources/Interventions Provided:  Interventions: Short Term Rehab  Referral Comments: [facility] referral's placed via [facility] for STR per pt/spouse request  CM team will follow for final determination      Treatment Team Recommendation: Short Term Rehab  Discharge Destination Plan[de-identified] Short Term Rehab  Transport at Discharge : [facility]LS Ambulance

## 2022-11-05 LAB
ANION GAP SERPL CALCULATED.3IONS-SCNC: 2 MMOL/L (ref 4–13)
BASOPHILS # BLD AUTO: 0.01 THOUSANDS/ÂΜL (ref 0–0.1)
BASOPHILS NFR BLD AUTO: 0 % (ref 0–1)
BUN SERPL-MCNC: 14 MG/DL (ref 5–25)
CALCIUM SERPL-MCNC: 7.9 MG/DL (ref 8.3–10.1)
CHLORIDE SERPL-SCNC: 108 MMOL/L (ref 96–108)
CO2 SERPL-SCNC: 32 MMOL/L (ref 21–32)
CREAT SERPL-MCNC: 0.66 MG/DL (ref 0.6–1.3)
EOSINOPHIL # BLD AUTO: 0.04 THOUSAND/ÂΜL (ref 0–0.61)
EOSINOPHIL NFR BLD AUTO: 1 % (ref 0–6)
ERYTHROCYTE [DISTWIDTH] IN BLOOD BY AUTOMATED COUNT: 13.2 % (ref 11.6–15.1)
GFR SERPL CREATININE-BSD FRML MDRD: 94 ML/MIN/1.73SQ M
GLUCOSE SERPL-MCNC: 109 MG/DL (ref 65–140)
HCT VFR BLD AUTO: 23.4 % (ref 36.5–49.3)
HGB BLD-MCNC: 7.2 G/DL (ref 12–17)
IMM GRANULOCYTES # BLD AUTO: 0.03 THOUSAND/UL (ref 0–0.2)
IMM GRANULOCYTES NFR BLD AUTO: 0 % (ref 0–2)
LYMPHOCYTES # BLD AUTO: 1.54 THOUSANDS/ÂΜL (ref 0.6–4.47)
LYMPHOCYTES NFR BLD AUTO: 18 % (ref 14–44)
MCH RBC QN AUTO: 29 PG (ref 26.8–34.3)
MCHC RBC AUTO-ENTMCNC: 30.8 G/DL (ref 31.4–37.4)
MCV RBC AUTO: 94 FL (ref 82–98)
MONOCYTES # BLD AUTO: 0.74 THOUSAND/ÂΜL (ref 0.17–1.22)
MONOCYTES NFR BLD AUTO: 9 % (ref 4–12)
NEUTROPHILS # BLD AUTO: 6.34 THOUSANDS/ÂΜL (ref 1.85–7.62)
NEUTS SEG NFR BLD AUTO: 72 % (ref 43–75)
NRBC BLD AUTO-RTO: 0 /100 WBCS
PLATELET # BLD AUTO: 165 THOUSANDS/UL (ref 149–390)
PMV BLD AUTO: 9.3 FL (ref 8.9–12.7)
POTASSIUM SERPL-SCNC: 4.1 MMOL/L (ref 3.5–5.3)
RBC # BLD AUTO: 2.48 MILLION/UL (ref 3.88–5.62)
SODIUM SERPL-SCNC: 142 MMOL/L (ref 135–147)
WBC # BLD AUTO: 8.7 THOUSAND/UL (ref 4.31–10.16)

## 2022-11-05 RX ORDER — ROPINIROLE 1 MG/1
1 TABLET, FILM COATED ORAL
COMMUNITY
End: 2022-11-09

## 2022-11-05 RX ORDER — FLUTICASONE PROPIONATE 50 MCG
2 SPRAY, SUSPENSION (ML) NASAL DAILY
COMMUNITY

## 2022-11-05 RX ORDER — FLUTICASONE PROPIONATE 50 MCG
1 SPRAY, SUSPENSION (ML) NASAL DAILY
Status: DISCONTINUED | OUTPATIENT
Start: 2022-11-05 | End: 2022-11-09 | Stop reason: HOSPADM

## 2022-11-05 RX ORDER — METOPROLOL SUCCINATE 25 MG/1
25 TABLET, EXTENDED RELEASE ORAL
COMMUNITY

## 2022-11-05 RX ORDER — ASPIRIN 81 MG/1
81 TABLET, CHEWABLE ORAL DAILY
COMMUNITY

## 2022-11-05 RX ORDER — ATORVASTATIN CALCIUM 40 MG/1
40 TABLET, FILM COATED ORAL DAILY
COMMUNITY

## 2022-11-05 RX ORDER — POLYVINYL ALCOHOL 14 MG/ML
1 SOLUTION/ DROPS OPHTHALMIC AS NEEDED
COMMUNITY

## 2022-11-05 RX ORDER — EZETIMIBE 10 MG/1
10 TABLET ORAL DAILY
COMMUNITY

## 2022-11-05 RX ORDER — MULTIVITAMIN
1 TABLET ORAL DAILY
COMMUNITY

## 2022-11-05 RX ADMIN — METHOCARBAMOL 500 MG: 500 TABLET ORAL at 21:31

## 2022-11-05 RX ADMIN — TAMSULOSIN HYDROCHLORIDE 0.4 MG: 0.4 CAPSULE ORAL at 17:51

## 2022-11-05 RX ADMIN — ENOXAPARIN SODIUM 30 MG: 30 INJECTION SUBCUTANEOUS at 09:01

## 2022-11-05 RX ADMIN — Medication 1 TABLET: at 09:01

## 2022-11-05 RX ADMIN — DOCUSATE SODIUM 100 MG: 100 CAPSULE, LIQUID FILLED ORAL at 09:01

## 2022-11-05 RX ADMIN — ACETAMINOPHEN 975 MG: 325 TABLET ORAL at 21:32

## 2022-11-05 RX ADMIN — METHOCARBAMOL 500 MG: 500 TABLET ORAL at 13:29

## 2022-11-05 RX ADMIN — GLYCERIN 2 DROP: .002; .002; .01 SOLUTION/ DROPS OPHTHALMIC at 13:33

## 2022-11-05 RX ADMIN — Medication 12.5 MG: at 21:31

## 2022-11-05 RX ADMIN — FLUTICASONE PROPIONATE 1 SPRAY: 50 SPRAY, METERED NASAL at 13:33

## 2022-11-05 RX ADMIN — METHOCARBAMOL 500 MG: 500 TABLET ORAL at 05:50

## 2022-11-05 RX ADMIN — ASPIRIN 81 MG: 81 TABLET, COATED ORAL at 09:01

## 2022-11-05 RX ADMIN — ACETAMINOPHEN 975 MG: 325 TABLET ORAL at 05:50

## 2022-11-05 RX ADMIN — OXYCODONE HYDROCHLORIDE 2.5 MG: 5 TABLET ORAL at 09:03

## 2022-11-05 RX ADMIN — EZETIMIBE 10 MG: 10 TABLET ORAL at 21:31

## 2022-11-05 RX ADMIN — DOCUSATE SODIUM 100 MG: 100 CAPSULE, LIQUID FILLED ORAL at 17:51

## 2022-11-05 RX ADMIN — ATORVASTATIN CALCIUM 40 MG: 40 TABLET, FILM COATED ORAL at 17:51

## 2022-11-05 RX ADMIN — OXYCODONE HYDROCHLORIDE 5 MG: 5 TABLET ORAL at 17:51

## 2022-11-05 RX ADMIN — ENOXAPARIN SODIUM 30 MG: 30 INJECTION SUBCUTANEOUS at 21:31

## 2022-11-05 RX ADMIN — ACETAMINOPHEN 975 MG: 325 TABLET ORAL at 13:29

## 2022-11-05 RX ADMIN — ROPINIROLE HYDROCHLORIDE 1 MG: 1 TABLET, FILM COATED ORAL at 21:31

## 2022-11-05 NOTE — PROGRESS NOTES
Progress Note - Orthopedics   Darcy Clements 77 y o  male MRN: [id number]      Subjective:  No acute events overnight  No acute distress  Denies fevers, chills, SOB  Objective:  A 10 point ROS was performed; negative except as noted above  Labs:  Lab Results   Component Value Date/Time    WBC 8 70 11/05/2022 05:10 AM    HGB 7 2 (L) 11/05/2022 05:10 AM       Physical:  Vitals:    11/05/22 0241   BP: 110/58   Pulse:    Resp:    Temp:    SpO2:      Musculoskeletal: right Lower Extremity  · Dressing changed, incision C/D/I  · TTP around knee  · SILT edgardo/saph/sp/dp/tib nerve distributions   · +FHL/EHL, +ankle DF/PF      · Toes WWP w BCR    Assessment:    77 y o  male post op day #2 from Right distal femur periprosthetic fracture ORIF    Plan:  · NWB RLE  · PT/OT for knee ROM  · Pain control  · DVT ppx: Sequential compression device (Venodyne)  and Enoxaparin (Lovenox)  · Patient noted to have acute blood loss anemia due to a drop in Hbg of > 2 0g from preop levels, will monitor vital signs and resuscitate with IV fluids as needed  · Dispo: [id number] Darcie Mcgarry for discharge from [address] [place] [address] MD Katie

## 2022-11-05 NOTE — PROGRESS NOTES
[facility]  Progress Note - Murali Anaya 1945, 77 y o  male MRN: [id number]  Unit/Bed#: [facility] 628-01 Encounter: [id number]  Primary Care Provider: Guillermo Worthy MD   Date and time admitted to hospital: 11/2/2022  8:59 PM    * Periprosthetic fracture of knee  Assessment & Plan  - orthopedic recommendations s/p Right ORIF:   - Non weight bearing  right lower extremity   - clear for discharge, will follow up in 2 weeks with Ortho  - APS recommendations:   - Right-sided single shot femoral nerve block with local anesthetic is functioning appropriately, patient does report some mild sensory deficit the medial aspect of his right knee as well as some mild motor deficit which has been resolving  Pain continues to be well controlled  - Tylenol 975 mg every 8 hours scheduled   - Robaxin 500 mg every 8 hours, for muscle spasms   - Oxycodone 2 5 mg every 4 hours as needed, for moderate pain   - Oxycodone 5 mg every 4 hours as needed for severe pain    - Colace 100 mg twice daily  - PT recommended Acute Rehab, placement pending  - medically appropriate for discharge once accepted        Bowel Regimen:  Yes  VTE Prophylaxis:Sequential compression device (Venodyne)  and Enoxaparin (Lovenox)     Disposition:  Pending placement to acute rehab    Subjective   Chief Complaint:  Right leg pain, low back pain    Subjective:  No acute events overnight  Feeling well this morning  Still has some residual right leg pain  Notes exacerbation of his chronic lower back pain  Tolerating a diet  Voiding and stooling  Objective   Vitals:   Temp:  [97 7 °F (36 5 °C)-98 4 °F (36 9 °C)] 97 7 °F (36 5 °C)  HR:  [67-96] 67  Resp:  [18] 18  BP: ()/(53-94) 113/65    I/O       11/03 0701  11/04 0700 11/04 0701  11/05 0700 11/05 0701  11/06 0700    P  O  480  480    I V  (mL/kg) 2040 (15 6) 1138 8 (8 7)     IV Piggyback 50      Total Intake(mL/kg) 2570 (19 6) 1138 8 (8 7) 480 (3 7)    Urine (mL/kg/hr) 1765 (0 6) 3225 (1) 550 (0 5)    Drains 36      Blood 200      Total Output 2001 3225 550    Net +814 -9625  3 -70                  Physical Exam:   GENERAL APPEARANCE:  Sitting up comfortably in bed, no acute distress  NEURO:  A&O x3, no focal deficits  HEENT:  Normocephalic, atraumatic  CV:  Regular rate and rhythm  LUNGS:  Breathing comfortably on room air  GI:  Soft, nondistended, nontender  MSK:  Right leg wrapped, swollen  Moving all other extremities  Sensation intact for all extremities    2+  DP pulses bilaterally  SKIN:  Warm and dry    Invasive Devices  Report    Peripheral Intravenous Line  Duration           Peripheral IV 11/02/22 Distal;Right;Upper;Ventral (anterior) Arm 2 days    Peripheral IV 11/03/22 Left Hand 2 days                      Lab Results: Results: I have personally reviewed all pertinent laboratory/tests results  Imaging:  no new imaging  Other Studies:  N/a

## 2022-11-05 NOTE — PLAN OF CARE
Problem: Potential for Falls  Goal: Patient will remain free of falls  Description: INTERVENTIONS:  - Educate patient/family on patient safety including physical limitations  - Instruct patient to call for assistance with activity   - Consult OT/PT to assist with strengthening/mobility   - Keep Call bell within reach  - Keep bed low and locked with side rails adjusted as appropriate  - Keep care items and personal belongings within reach  - Initiate and maintain comfort rounds  - Make Fall Risk Sign visible to staff  - Offer Toileting every 2 Hours, in advance of need  - Initiate/Maintain alarm  - Obtain necessary fall risk management equipment:   - Apply yellow socks and bracelet for high fall risk patients  - Consider moving patient to room near nurses station  Outcome: Progressing     Problem: PAIN - ADULT  Goal: Verbalizes/displays adequate comfort level or baseline comfort level  Description: Interventions:  - Encourage patient to monitor pain and request assistance  - Assess pain using appropriate pain scale  - Administer analgesics based on type and severity of pain and evaluate response  - Implement non-pharmacological measures as appropriate and evaluate response  - Consider cultural and social influences on pain and pain management  - Notify physician/advanced practitioner if interventions unsuccessful or patient reports new pain  Outcome: Progressing     Problem: SAFETY ADULT  Goal: Patient will remain free of falls  Description: INTERVENTIONS:  - Educate patient/family on patient safety including physical limitations  - Instruct patient to call for assistance with activity   - Consult OT/PT to assist with strengthening/mobility   - Keep Call bell within reach  - Keep bed low and locked with side rails adjusted as appropriate  - Keep care items and personal belongings within reach  - Initiate and maintain comfort rounds  - Make Fall Risk Sign visible to staff  - Offer Toileting every 2 Hours, in advance of need  - Initiate/Maintain alarm  - Obtain necessary fall risk management equipment:   - Apply yellow socks and bracelet for high fall risk patients  - Consider moving patient to room near nurses station  Outcome: Progressing  Goal: Maintain or return to baseline ADL function  Description: INTERVENTIONS:  -  Assess patient's ability to carry out ADLs; assess patient's baseline for ADL function and identify physical deficits which impact ability to perform ADLs (bathing, care of mouth/teeth, toileting, grooming, dressing, etc )  - Assess/evaluate cause of self-care deficits   - Assess range of motion  - Assess patient's mobility; develop plan if impaired  - Assess patient's need for assistive devices and provide as appropriate  - Encourage maximum independence but intervene and supervise when necessary  - Involve family in performance of ADLs  - Assess for home care needs following discharge   - Consider OT consult to assist with ADL evaluation and planning for discharge  - Provide patient education as appropriate  Outcome: Progressing  Goal: Maintains/Returns to pre admission functional level  Description: INTERVENTIONS:  - Perform BMAT or MOVE assessment daily    - Set and communicate daily mobility goal to care team and patient/family/caregiver  - Collaborate with rehabilitation services on mobility goals if consulted  - Perform Range of Motion 3 times a day  - Reposition patient every 2 hours    - Dangle patient 3 times a day  - Stand patient 3 times a day  - Ambulate patient 3 times a day  - Out of bed to chair 3 times a day   - Out of bed for meals 3 times a day  - Out of bed for toileting  - Record patient progress and toleration of activity level   Outcome: Progressing     Problem: DISCHARGE PLANNING  Goal: Discharge to home or other facility with appropriate resources  Description: INTERVENTIONS:  - Identify barriers to discharge w/patient and caregiver  - Arrange for needed discharge resources and transportation as appropriate  - Identify discharge learning needs (meds, wound care, etc )  - Arrange for interpretive services to assist at discharge as needed  - Refer to Case Management Department for coordinating discharge planning if the patient needs post-hospital services based on physician/advanced practitioner order or complex needs related to functional status, cognitive ability, or social support system  Outcome: Progressing     Problem: Knowledge Deficit  Goal: Patient/family/caregiver demonstrates understanding of disease process, treatment plan, medications, and discharge instructions  Description: Complete learning assessment and assess knowledge base    Interventions:  - Provide teaching at level of understanding  - Provide teaching via preferred learning methods  Outcome: Progressing     Problem: Prexisting or High Potential for Compromised Skin Integrity  Goal: Skin integrity is maintained or improved  Description: INTERVENTIONS:  - Identify patients at risk for skin breakdown  - Assess and monitor skin integrity  - Assess and monitor nutrition and hydration status  - Monitor labs   - Assess for incontinence   - Turn and reposition patient  - Assist with mobility/ambulation  - Relieve pressure over bony prominences  - Avoid friction and shearing  - Provide appropriate hygiene as needed including keeping skin clean and dry  - Evaluate need for skin moisturizer/barrier cream  - Collaborate with interdisciplinary team   - Patient/family teaching  - Consider wound care consult   Outcome: Progressing     Problem: MOBILITY - ADULT  Goal: Maintain or return to baseline ADL function  Description: INTERVENTIONS:  -  Assess patient's ability to carry out ADLs; assess patient's baseline for ADL function and identify physical deficits which impact ability to perform ADLs (bathing, care of mouth/teeth, toileting, grooming, dressing, etc )  - Assess/evaluate cause of self-care deficits   - Assess range of motion  - Assess patient's mobility; develop plan if impaired  - Assess patient's need for assistive devices and provide as appropriate  - Encourage maximum independence but intervene and supervise when necessary  - Involve family in performance of ADLs  - Assess for home care needs following discharge   - Consider OT consult to assist with ADL evaluation and planning for discharge  - Provide patient education as appropriate  Outcome: Progressing  Goal: Maintains/Returns to pre admission functional level  Description: INTERVENTIONS:  - Perform BMAT or MOVE assessment daily    - Set and communicate daily mobility goal to care team and patient/family/caregiver  - Collaborate with rehabilitation services on mobility goals if consulted  - Perform Range of Motion 3 times a day  - Reposition patient every 2 hours    - Dangle patient 3 times a day  - Stand patient 3 times a day  - Ambulate patient 3 times a day  - Out of bed to chair 3 times a day   - Out of bed for meals 3 times a day  - Out of bed for toileting  - Record patient progress and toleration of activity level   Outcome: Progressing     Problem: MUSCULOSKELETAL - ADULT  Goal: Maintain or return mobility to safest level of function  Description: INTERVENTIONS:  - Assess patient's ability to carry out ADLs; assess patient's baseline for ADL function and identify physical deficits which impact ability to perform ADLs (bathing, care of mouth/teeth, toileting, grooming, dressing, etc )  - Assess/evaluate cause of self-care deficits   - Assess range of motion  - Assess patient's mobility  - Assess patient's need for assistive devices and provide as appropriate  - Encourage maximum independence but intervene and supervise when necessary  - Involve family in performance of ADLs  - Assess for home care needs following discharge   - Consider OT consult to assist with ADL evaluation and planning for discharge  - Provide patient education as appropriate  Outcome: Progressing  Goal: Maintain proper alignment of affected body part  Description: INTERVENTIONS:  - Support, maintain and protect limb and body alignment  - Provide patient/ family with appropriate education  Outcome: Progressing

## 2022-11-06 LAB
ANION GAP SERPL CALCULATED.3IONS-SCNC: 0 MMOL/L (ref 4–13)
BUN SERPL-MCNC: 13 MG/DL (ref 5–25)
CALCIUM SERPL-MCNC: 8.1 MG/DL (ref 8.3–10.1)
CHLORIDE SERPL-SCNC: 107 MMOL/L (ref 96–108)
CO2 SERPL-SCNC: 34 MMOL/L (ref 21–32)
CREAT SERPL-MCNC: 0.61 MG/DL (ref 0.6–1.3)
GFR SERPL CREATININE-BSD FRML MDRD: 97 ML/MIN/1.73SQ M
GLUCOSE SERPL-MCNC: 95 MG/DL (ref 65–140)
POTASSIUM SERPL-SCNC: 4 MMOL/L (ref 3.5–5.3)
SODIUM SERPL-SCNC: 141 MMOL/L (ref 135–147)

## 2022-11-06 RX ADMIN — TAMSULOSIN HYDROCHLORIDE 0.4 MG: 0.4 CAPSULE ORAL at 17:30

## 2022-11-06 RX ADMIN — ATORVASTATIN CALCIUM 40 MG: 40 TABLET, FILM COATED ORAL at 17:30

## 2022-11-06 RX ADMIN — ENOXAPARIN SODIUM 30 MG: 30 INJECTION SUBCUTANEOUS at 21:16

## 2022-11-06 RX ADMIN — Medication 1 TABLET: at 08:06

## 2022-11-06 RX ADMIN — ROPINIROLE HYDROCHLORIDE 1 MG: 1 TABLET, FILM COATED ORAL at 21:16

## 2022-11-06 RX ADMIN — OXYCODONE HYDROCHLORIDE 5 MG: 5 TABLET ORAL at 15:22

## 2022-11-06 RX ADMIN — GLYCERIN 2 DROP: .002; .002; .01 SOLUTION/ DROPS OPHTHALMIC at 08:06

## 2022-11-06 RX ADMIN — OXYCODONE HYDROCHLORIDE 5 MG: 5 TABLET ORAL at 11:08

## 2022-11-06 RX ADMIN — OXYCODONE HYDROCHLORIDE 5 MG: 5 TABLET ORAL at 23:06

## 2022-11-06 RX ADMIN — ENOXAPARIN SODIUM 30 MG: 30 INJECTION SUBCUTANEOUS at 08:07

## 2022-11-06 RX ADMIN — Medication 12.5 MG: at 21:16

## 2022-11-06 RX ADMIN — FLUTICASONE PROPIONATE 1 SPRAY: 50 SPRAY, METERED NASAL at 08:06

## 2022-11-06 RX ADMIN — ASPIRIN 81 MG: 81 TABLET, COATED ORAL at 08:06

## 2022-11-06 RX ADMIN — EZETIMIBE 10 MG: 10 TABLET ORAL at 21:16

## 2022-11-06 RX ADMIN — METHOCARBAMOL 500 MG: 500 TABLET ORAL at 21:16

## 2022-11-06 RX ADMIN — DOCUSATE SODIUM 100 MG: 100 CAPSULE, LIQUID FILLED ORAL at 17:30

## 2022-11-06 RX ADMIN — METHOCARBAMOL 500 MG: 500 TABLET ORAL at 05:09

## 2022-11-06 RX ADMIN — ACETAMINOPHEN 975 MG: 325 TABLET ORAL at 21:16

## 2022-11-06 RX ADMIN — ACETAMINOPHEN 975 MG: 325 TABLET ORAL at 05:09

## 2022-11-06 RX ADMIN — Medication 12.5 MG: at 08:06

## 2022-11-06 RX ADMIN — OXYCODONE HYDROCHLORIDE 5 MG: 5 TABLET ORAL at 05:09

## 2022-11-06 RX ADMIN — DOCUSATE SODIUM 100 MG: 100 CAPSULE, LIQUID FILLED ORAL at 08:06

## 2022-11-06 RX ADMIN — METHOCARBAMOL 500 MG: 500 TABLET ORAL at 14:04

## 2022-11-06 RX ADMIN — ACETAMINOPHEN 975 MG: 325 TABLET ORAL at 14:04

## 2022-11-06 NOTE — PLAN OF CARE
Problem: Potential for Falls  Goal: Patient will remain free of falls  Description: INTERVENTIONS:  - Educate patient/family on patient safety including physical limitations  - Instruct patient to call for assistance with activity   - Consult OT/PT to assist with strengthening/mobility   - Keep Call bell within reach  - Keep bed low and locked with side rails adjusted as appropriate  - Keep care items and personal belongings within reach  - Initiate and maintain comfort rounds  - Make Fall Risk Sign visible to staff  - Offer Toileting every 2 Hours, in advance of need  - Initiate/Maintain alarm  - Obtain necessary fall risk management equipment:   - Apply yellow socks and bracelet for high fall risk patients  - Consider moving patient to room near nurses station  Outcome: Progressing     Problem: PAIN - ADULT  Goal: Verbalizes/displays adequate comfort level or baseline comfort level  Description: Interventions:  - Encourage patient to monitor pain and request assistance  - Assess pain using appropriate pain scale  - Administer analgesics based on type and severity of pain and evaluate response  - Implement non-pharmacological measures as appropriate and evaluate response  - Consider cultural and social influences on pain and pain management  - Notify physician/advanced practitioner if interventions unsuccessful or patient reports new pain  Outcome: Progressing     Problem: SAFETY ADULT  Goal: Patient will remain free of falls  Description: INTERVENTIONS:  - Educate patient/family on patient safety including physical limitations  - Instruct patient to call for assistance with activity   - Consult OT/PT to assist with strengthening/mobility   - Keep Call bell within reach  - Keep bed low and locked with side rails adjusted as appropriate  - Keep care items and personal belongings within reach  - Initiate and maintain comfort rounds  - Make Fall Risk Sign visible to staff  - Offer Toileting every 2 Hours, in advance of need  - Initiate/Maintain alarm  - Obtain necessary fall risk management equipment:   - Apply yellow socks and bracelet for high fall risk patients  - Consider moving patient to room near nurses station  Outcome: Progressing  Goal: Maintain or return to baseline ADL function  Description: INTERVENTIONS:  -  Assess patient's ability to carry out ADLs; assess patient's baseline for ADL function and identify physical deficits which impact ability to perform ADLs (bathing, care of mouth/teeth, toileting, grooming, dressing, etc )  - Assess/evaluate cause of self-care deficits   - Assess range of motion  - Assess patient's mobility; develop plan if impaired  - Assess patient's need for assistive devices and provide as appropriate  - Encourage maximum independence but intervene and supervise when necessary  - Involve family in performance of ADLs  - Assess for home care needs following discharge   - Consider OT consult to assist with ADL evaluation and planning for discharge  - Provide patient education as appropriate  Outcome: Progressing  Goal: Maintains/Returns to pre admission functional level  Description: INTERVENTIONS:  - Perform BMAT or MOVE assessment daily    - Set and communicate daily mobility goal to care team and patient/family/caregiver  - Collaborate with rehabilitation services on mobility goals if consulted  - Perform Range of Motion 3 times a day  - Reposition patient every 2 hours    - Dangle patient 3 times a day  - Stand patient 3 times a day  - Ambulate patient 3 times a day  - Out of bed to chair 3 times a day   - Out of bed for meals 3 times a day  - Out of bed for toileting  - Record patient progress and toleration of activity level   Outcome: Progressing     Problem: DISCHARGE PLANNING  Goal: Discharge to home or other facility with appropriate resources  Description: INTERVENTIONS:  - Identify barriers to discharge w/patient and caregiver  - Arrange for needed discharge resources and transportation as appropriate  - Identify discharge learning needs (meds, wound care, etc )  - Arrange for interpretive services to assist at discharge as needed  - Refer to Case Management Department for coordinating discharge planning if the patient needs post-hospital services based on physician/advanced practitioner order or complex needs related to functional status, cognitive ability, or social support system  Outcome: Progressing     Problem: Knowledge Deficit  Goal: Patient/family/caregiver demonstrates understanding of disease process, treatment plan, medications, and discharge instructions  Description: Complete learning assessment and assess knowledge base    Interventions:  - Provide teaching at level of understanding  - Provide teaching via preferred learning methods  Outcome: Progressing     Problem: Prexisting or High Potential for Compromised Skin Integrity  Goal: Skin integrity is maintained or improved  Description: INTERVENTIONS:  - Identify patients at risk for skin breakdown  - Assess and monitor skin integrity  - Assess and monitor nutrition and hydration status  - Monitor labs   - Assess for incontinence   - Turn and reposition patient  - Assist with mobility/ambulation  - Relieve pressure over bony prominences  - Avoid friction and shearing  - Provide appropriate hygiene as needed including keeping skin clean and dry  - Evaluate need for skin moisturizer/barrier cream  - Collaborate with interdisciplinary team   - Patient/family teaching  - Consider wound care consult   Outcome: Progressing     Problem: MOBILITY - ADULT  Goal: Maintain or return to baseline ADL function  Description: INTERVENTIONS:  -  Assess patient's ability to carry out ADLs; assess patient's baseline for ADL function and identify physical deficits which impact ability to perform ADLs (bathing, care of mouth/teeth, toileting, grooming, dressing, etc )  - Assess/evaluate cause of self-care deficits   - Assess range of motion  - Assess patient's mobility; develop plan if impaired  - Assess patient's need for assistive devices and provide as appropriate  - Encourage maximum independence but intervene and supervise when necessary  - Involve family in performance of ADLs  - Assess for home care needs following discharge   - Consider OT consult to assist with ADL evaluation and planning for discharge  - Provide patient education as appropriate  Outcome: Progressing  Goal: Maintains/Returns to pre admission functional level  Description: INTERVENTIONS:  - Perform BMAT or MOVE assessment daily    - Set and communicate daily mobility goal to care team and patient/family/caregiver  - Collaborate with rehabilitation services on mobility goals if consulted  - Perform Range of Motion 3 times a day  - Reposition patient every 2 hours    - Dangle patient 3 times a day  - Stand patient 3 times a day  - Ambulate patient 3 times a day  - Out of bed to chair 3 times a day   - Out of bed for meals 3 times a day  - Out of bed for toileting  - Record patient progress and toleration of activity level   Outcome: Progressing     Problem: MUSCULOSKELETAL - ADULT  Goal: Maintain or return mobility to safest level of function  Description: INTERVENTIONS:  - Assess patient's ability to carry out ADLs; assess patient's baseline for ADL function and identify physical deficits which impact ability to perform ADLs (bathing, care of mouth/teeth, toileting, grooming, dressing, etc )  - Assess/evaluate cause of self-care deficits   - Assess range of motion  - Assess patient's mobility  - Assess patient's need for assistive devices and provide as appropriate  - Encourage maximum independence but intervene and supervise when necessary  - Involve family in performance of ADLs  - Assess for home care needs following discharge   - Consider OT consult to assist with ADL evaluation and planning for discharge  - Provide patient education as appropriate  Outcome: Progressing  Goal: Maintain proper alignment of affected body part  Description: INTERVENTIONS:  - Support, maintain and protect limb and body alignment  - Provide patient/ family with appropriate education  Outcome: Progressing Spine appears normal, range of motion is not limited, no muscle or joint tenderness

## 2022-11-06 NOTE — ASSESSMENT & PLAN NOTE
- s/p ORIF R knee ORIF:   - Non weight bearing right lower extremity   - clear for discharge, will follow up in 2 weeks with Ortho  - APS recommendations:   - Right-sided single shot femoral nerve block with local anesthetic is functioning appropriately, patient does report some mild sensory deficit the medial aspect of his right knee as well as some mild motor deficit which has been resolving  Pain continues to be well controlled     - Tylenol 975 mg every 8 hours scheduled   - Robaxin 500 mg every 8 hours, for muscle spasms   - Oxycodone 2 5 mg every 4 hours as needed, for moderate pain   - Oxycodone 5 mg every 4 hours as needed for severe pain    - Colace 100 mg twice daily  - PT recommended Acute Rehab, placement pending  - medically appropriate for discharge once accepted

## 2022-11-06 NOTE — PROGRESS NOTES
[facility]  Progress Note - Girtha Cords 1945, 77 y o  male MRN: [id number]  Unit/Bed#: [facility] 628-01 Encounter: [id number]  Primary Care Provider: Aurelia Osler, MD   Date and time admitted to hospital: 11/2/2022  8:59 PM    * Periprosthetic fracture of knee  Assessment & Plan  - s/p ORIF R knee ORIF:   - Non weight bearing  right lower extremity   - clear for discharge, will follow up in 2 weeks with Ortho  - APS recommendations:   - Right-sided single shot femoral nerve block with local anesthetic is functioning appropriately, patient does report some mild sensory deficit the medial aspect of his right knee as well as some mild motor deficit which has been resolving  Pain continues to be well controlled  - Tylenol 975 mg every 8 hours scheduled   - Robaxin 500 mg every 8 hours, for muscle spasms   - Oxycodone 2 5 mg every 4 hours as needed, for moderate pain   - Oxycodone 5 mg every 4 hours as needed for severe pain    - Colace 100 mg twice daily  - PT recommended Acute Rehab, placement pending  - medically appropriate for discharge once accepted      Bowel Regimen: Yes  VTE Prophylaxis:Sequential compression device (Venodyne)  and Enoxaparin (Lovenox)     Disposition: Pending placement to acute rehab    Subjective   Chief Complaint: R leg pain    Subjective: No acute events overnight  Feels well overnight, excited to leave the hospital and go to rehab to start his recovery  Notes R leg soreness  Objective   Vitals:   Temp:  [97 7 °F (36 5 °C)-98 5 °F (36 9 °C)] 97 7 °F (36 5 °C)  HR:  [70-87] 70  Resp:  [14-18] 18  BP: (110-121)/(52-59) 118/59    I/O       11/04 0701  11/05 0700 11/05 0701  11/06 0700    P  O   1400    I V  (mL/kg) 1138 8 (8 7)     Total Intake(mL/kg) 1138 8 (8 7) 1400 (10 7)    Urine (mL/kg/hr) 3225 (1) 3150 (1)    Total Output 3225 3150    Net -2086 3 -1750                 Physical Exam:   GENERAL APPEARANCE:  Sitting up comfortably in bed, no acute distress  NEURO:  A&O x3, no focal deficits  HEENT:  Normocephalic, atraumatic  CV:  Regular rate and rhythm  LUNGS:  Breathing comfortably on room air  GI:  Soft, nondistended, nontender  MSK:  Right leg wrapped, swollen  Moving all other extremities  Sensation intact for all extremities  2+  DP pulses bilaterally  SKIN:  Warm and dry    Invasive Devices  Report    Peripheral Intravenous Line  Duration           Peripheral IV 11/02/22 Distal;Right;Upper;Ventral (anterior) Arm 3 days    Peripheral IV 11/03/22 Left Hand 2 days                      Lab Results: Results: I have personally reviewed all pertinent laboratory/tests results  Imaging: I have personally reviewed pertinent reports       Other Studies: n/a

## 2022-11-07 ENCOUNTER — EPISODE CHANGES (OUTPATIENT)
Dept: CASE MANAGEMENT | Facility: OTHER | Age: 77
End: 2022-11-07

## 2022-11-07 LAB
BASOPHILS # BLD AUTO: 0.03 THOUSANDS/ÂΜL (ref 0–0.1)
BASOPHILS NFR BLD AUTO: 0 % (ref 0–1)
EOSINOPHIL # BLD AUTO: 0.17 THOUSAND/ÂΜL (ref 0–0.61)
EOSINOPHIL NFR BLD AUTO: 2 % (ref 0–6)
ERYTHROCYTE [DISTWIDTH] IN BLOOD BY AUTOMATED COUNT: 13.2 % (ref 11.6–15.1)
HCT VFR BLD AUTO: 25.7 % (ref 36.5–49.3)
HGB BLD-MCNC: 8.1 G/DL (ref 12–17)
IMM GRANULOCYTES # BLD AUTO: 0.04 THOUSAND/UL (ref 0–0.2)
IMM GRANULOCYTES NFR BLD AUTO: 1 % (ref 0–2)
LYMPHOCYTES # BLD AUTO: 1.31 THOUSANDS/ÂΜL (ref 0.6–4.47)
LYMPHOCYTES NFR BLD AUTO: 19 % (ref 14–44)
MCH RBC QN AUTO: 29.3 PG (ref 26.8–34.3)
MCHC RBC AUTO-ENTMCNC: 31.5 G/DL (ref 31.4–37.4)
MCV RBC AUTO: 93 FL (ref 82–98)
MONOCYTES # BLD AUTO: 0.37 THOUSAND/ÂΜL (ref 0.17–1.22)
MONOCYTES NFR BLD AUTO: 5 % (ref 4–12)
NEUTROPHILS # BLD AUTO: 5.15 THOUSANDS/ÂΜL (ref 1.85–7.62)
NEUTS SEG NFR BLD AUTO: 73 % (ref 43–75)
NRBC BLD AUTO-RTO: 0 /100 WBCS
PLATELET # BLD AUTO: 216 THOUSANDS/UL (ref 149–390)
PMV BLD AUTO: 9.1 FL (ref 8.9–12.7)
RBC # BLD AUTO: 2.76 MILLION/UL (ref 3.88–5.62)
WBC # BLD AUTO: 7.07 THOUSAND/UL (ref 4.31–10.16)

## 2022-11-07 RX ORDER — POLYETHYLENE GLYCOL 3350 17 G/17G
17 POWDER, FOR SOLUTION ORAL DAILY PRN
Status: DISCONTINUED | OUTPATIENT
Start: 2022-11-07 | End: 2022-11-09 | Stop reason: HOSPADM

## 2022-11-07 RX ORDER — DIPHENHYDRAMINE HYDROCHLORIDE 50 MG/ML
25 INJECTION INTRAMUSCULAR; INTRAVENOUS ONCE
Status: COMPLETED | OUTPATIENT
Start: 2022-11-07 | End: 2022-11-07

## 2022-11-07 RX ORDER — SENNOSIDES 8.6 MG
1 TABLET ORAL DAILY
Status: DISCONTINUED | OUTPATIENT
Start: 2022-11-07 | End: 2022-11-09 | Stop reason: HOSPADM

## 2022-11-07 RX ADMIN — EZETIMIBE 10 MG: 10 TABLET ORAL at 21:16

## 2022-11-07 RX ADMIN — ACETAMINOPHEN 975 MG: 325 TABLET ORAL at 12:50

## 2022-11-07 RX ADMIN — GLYCERIN 2 DROP: .002; .002; .01 SOLUTION/ DROPS OPHTHALMIC at 09:08

## 2022-11-07 RX ADMIN — ROPINIROLE HYDROCHLORIDE 1 MG: 1 TABLET, FILM COATED ORAL at 21:16

## 2022-11-07 RX ADMIN — DOCUSATE SODIUM 100 MG: 100 CAPSULE, LIQUID FILLED ORAL at 17:12

## 2022-11-07 RX ADMIN — METHOCARBAMOL 500 MG: 500 TABLET ORAL at 05:40

## 2022-11-07 RX ADMIN — OXYCODONE HYDROCHLORIDE 5 MG: 5 TABLET ORAL at 05:39

## 2022-11-07 RX ADMIN — ENOXAPARIN SODIUM 30 MG: 30 INJECTION SUBCUTANEOUS at 09:08

## 2022-11-07 RX ADMIN — ACETAMINOPHEN 975 MG: 325 TABLET ORAL at 21:16

## 2022-11-07 RX ADMIN — SENNOSIDES 8.6 MG: 8.6 TABLET, FILM COATED ORAL at 10:12

## 2022-11-07 RX ADMIN — Medication 12.5 MG: at 21:16

## 2022-11-07 RX ADMIN — OXYCODONE HYDROCHLORIDE 2.5 MG: 5 TABLET ORAL at 17:12

## 2022-11-07 RX ADMIN — METHOCARBAMOL 500 MG: 500 TABLET ORAL at 21:16

## 2022-11-07 RX ADMIN — DIPHENHYDRAMINE HYDROCHLORIDE 25 MG: 50 INJECTION, SOLUTION INTRAMUSCULAR; INTRAVENOUS at 10:12

## 2022-11-07 RX ADMIN — METHOCARBAMOL 500 MG: 500 TABLET ORAL at 12:50

## 2022-11-07 RX ADMIN — ENOXAPARIN SODIUM 30 MG: 30 INJECTION SUBCUTANEOUS at 21:16

## 2022-11-07 RX ADMIN — TAMSULOSIN HYDROCHLORIDE 0.4 MG: 0.4 CAPSULE ORAL at 17:12

## 2022-11-07 RX ADMIN — FLUTICASONE PROPIONATE 1 SPRAY: 50 SPRAY, METERED NASAL at 09:08

## 2022-11-07 RX ADMIN — ATORVASTATIN CALCIUM 40 MG: 40 TABLET, FILM COATED ORAL at 17:12

## 2022-11-07 RX ADMIN — ACETAMINOPHEN 975 MG: 325 TABLET ORAL at 05:40

## 2022-11-07 RX ADMIN — ASPIRIN 81 MG: 81 TABLET, COATED ORAL at 09:08

## 2022-11-07 RX ADMIN — POLYETHYLENE GLYCOL 3350 17 G: 17 POWDER, FOR SOLUTION ORAL at 19:15

## 2022-11-07 RX ADMIN — OXYCODONE HYDROCHLORIDE 5 MG: 5 TABLET ORAL at 10:20

## 2022-11-07 RX ADMIN — DOCUSATE SODIUM 100 MG: 100 CAPSULE, LIQUID FILLED ORAL at 09:08

## 2022-11-07 RX ADMIN — Medication 1 TABLET: at 09:08

## 2022-11-07 NOTE — OCCUPATIONAL THERAPY NOTE
Occupational Therapy Treatment Note:      11/07/22 1100   OT Last Visit   OT Visit Date 11/07/22   Note Type   Note Type Treatment   Pain Assessment   Pain Assessment Tool 0-10   Pain Score 7   Pain Location/Orientation   (r le pain)   Restrictions/Precautions   RLE Weight Bearing Per Order NWB   Other Precautions Cognitive;WBS;Fall Risk   ADL   Where Assessed Chair   Grooming Assistance 5  Supervision/Setup   Grooming Comments using hand held mirror and electric razor   LB Dressing Assistance 2  Maximal Assistance   LB Dressing Comments unable to reach r le secondary to stretching / pain sensation   Transfers   Sit to Stand 3  Moderate assistance   Additional items   (asst x 1-2 , pt with difficulty transitioning ue's to walker secondary to tall statue)   Functional Mobility   Functional Mobility 3  Moderate assistance  (pt only able to take few small hops c azalia rw)   Additional items Rolling walker   Cognition   Overall Cognitive Status WFL   Activity Tolerance   Activity Tolerance Patient tolerated treatment well   Assessment   Assessment pt participated in am ot session and was seeen focusing on shaving face/ adl tasks lb dressing attempts and sit to from stand x 3 for transfers and chair adjustment  pt was cooperative and very talaktive this session  will follow focusing on goals from ie   Plan   Treatment Interventions Functional transfer training; Endurance training;Patient/family training;Equipment evaluation/education; Activityengagement   Goal Expiration Date 11/18/22   OT Treatment Day 1   OT Frequency 2-3x/wk   Recommendation   OT Discharge Recommendation Post acute rehabilitation services   AM-PAC Daily Activity Inpatient   Lower Body Dressing 2   Bathing 2   Toileting 2   Upper Body Dressing 3   Grooming 4   Eating 4   Daily Activity Raw Score 17   Daily Activity Standardized Score (Calc for Raw Score >=11) 37 26   AM-PAC Applied Cognition Inpatient   Following a Speech/Presentation 4   Understanding Ordinary Conversation 4   Taking Medications 4   Remembering Where Things Are Placed or Put Away 4   Remembering List of 4-5 Errands 4   Taking Care of Complicated Tasks 4   Applied Cognition Raw Score 24   Applied Cognition Standardized Score 62 21   April A Storm

## 2022-11-07 NOTE — CASE MANAGEMENT
Case Management Discharge Planning Note    Patient name Juanjose Rosado  Location [facility]16 MRN [id number]  : 1945 Date 2022       Current Admission Date: 2022  Current Admission Diagnosis:Periprosthetic fracture of knee   Patient Active Problem List    Diagnosis Date Noted   • Periprosthetic fracture of knee 2022      LOS (days): 5  Geometric Mean LOS (GMLOS) (days): 4 40  Days to GMLOS:-0 3     OBJECTIVE:  Risk of Unplanned Readmission Score: 14 02         Current admission status: Inpatient   Preferred Pharmacy:   [facility] [address] [place] [address], [address] [place] [id number] [address]  Phone: [phone] Fax: [phone]    Primary Care Provider: Rosaline Rahman MD    Primary Insurance: MEDICARE  Secondary Insurance: Myranda Leach    DISCHARGE DETAILS:    Discharge planning discussed with[de-identified] Patient  Freedom of Choice: Yes  Comments - Freedom of Choice: Discussed FOC  CM contacted family/caregiver?: Yes  Were Treatment Team discharge recommendations reviewed with patient/caregiver?: Yes  Did patient/caregiver verbalize understanding of patient care needs?: Yes  Were patient/caregiver advised of the risks associated with not following Treatment Team discharge recommendations?: Yes    Contacts  Patient Contacts: [place]own Bolaños  Relationship to Patient[de-identified] Family  Contact Method:  In Person  Reason/Outcome: Continuity of Care, Emergency Contact, Discharge Planning    Other Referral/Resources/Interventions Provided:  Interventions: Short Term Rehab  Referral Comments: Patient and wife requesting blanket referral to acute rehabs in his zip code, entered in [address]

## 2022-11-07 NOTE — PLAN OF CARE
Problem: OCCUPATIONAL THERAPY ADULT  Goal: Performs self-care activities at highest level of function for planned discharge setting  See evaluation for individualized goals  Description: Treatment Interventions: ADL retraining, Functional transfer training, Endurance training, Patient/family training, Equipment evaluation/education, Compensatory technique education, Continued evaluation, Energy conservation, Activityengagement          See flowsheet documentation for full assessment, interventions and recommendations  Outcome: Progressing  Note: Limitation: Decreased ADL status, Decreased Safe judgement during ADL, Decreased endurance, Decreased self-care trans, Decreased high-level ADLs  Prognosis: Good  Assessment: pt participated in am ot session and was seeen focusing on shaving face/ adl tasks lb dressing attempts and sit to from stand x 3 for transfers and chair adjustment  pt was cooperative and very talaktive this session   will follow focusing on goals from ie     OT Discharge Recommendation: Post acute rehabilitation services     Wandy Nava

## 2022-11-07 NOTE — PROGRESS NOTES
[facility]  Progress Note - Darcy Clements 1945, 77 y o  male MRN: [id number]  Unit/Bed#: [facility] 628-01 Encounter: [id number]  Primary Care Provider: Farheen Erickson MD   Date and time admitted to hospital: 11/2/2022  8:59 PM    * Periprosthetic fracture of knee  Assessment & Plan  - s/p ORIF R knee ORIF:   - Non weight bearing  right lower extremity   - clear for discharge, will follow up in 2 weeks with Ortho  - APS recommendations:   - Right-sided single shot femoral nerve block with local anesthetic is functioning appropriately, patient does report some mild sensory deficit the medial aspect of his right knee as well as some mild motor deficit which has been resolving  Pain continues to be well controlled  - Tylenol 975 mg every 8 hours scheduled   - Robaxin 500 mg every 8 hours, for muscle spasms   - Oxycodone 2 5 mg every 4 hours as needed, for moderate pain   - Oxycodone 5 mg every 4 hours as needed for severe pain    - Colace 100 mg twice daily  - PT recommended Acute Rehab, placement pending  - medically appropriate for discharge once accepted        Bowel Regimen: colace, senna   VTE Prophylaxis:Enoxaparin (Lovenox)     Disposition: rehab, pending     Subjective   Chief Complaint: new rash on back this AM     Subjective: "Its itchy"     Objective   Vitals:   Temp:  [97 5 °F (36 4 °C)-98 9 °F (37 2 °C)] 97 9 °F (36 6 °C)  HR:  [75-92] 79  Resp:  [18] 18  BP: (101-127)/(53-65) 101/57    I/O       11/05 0701  11/06 0700 11/06 0701  11/07 0700 11/07 0701  11/08 0700    P  O  1400 1480 240    I V  (mL/kg)       Total Intake(mL/kg) 1400 (10 7) 1480 (11 3) 240 (1 8)    Urine (mL/kg/hr) 3150 (1) 2825 (0 9) 450 (1 2)    Total Output 3150 2825 450    Net -1750 -1345 -210           Unmeasured Urine Occurrence  1 x            Physical Exam:   GENERAL APPEARANCE: NAD  NEURO: AAOx3  HEENT: normocephalic, atrauamtic  CV: RRR  LUNGS: No distress, room air   GI: soft nontender  : normal  MSK: RLE dressed c/d/i  SKIN: warm, dry, morbilliform eruption on back     Invasive Devices  Report    None                       Lab Results: Results: n/a and BMP/CMP: No results found for: SODIUM, K, CL, CO2, ANIONGAP, BUN, CREATININE, GLUCOSE, CALCIUM, AST, ALT, ALKPHOS, PROT, BILITOT, EGFR  Imaging: n/a   Other Studies: n/a

## 2022-11-07 NOTE — PHYSICAL THERAPY NOTE
Physical Therapy Progress Note     11/07/22 1114   Note Type   Note Type Treatment   Pain Assessment   Pain Assessment Tool FLACC   Pain Rating: FLACC (Rest) - Face 1   Pain Rating: FLACC (Rest) - Legs 0   Pain Rating: FLACC (Rest) - Activity 0   Pain Rating: FLACC (Rest) - Cry 1   Pain Rating: FLACC (Rest) - Consolability 0   Score: FLACC (Rest) 2   Pain Rating: FLACC (Activity) - Face 2   Pain Rating: FLACC (Activity) - Legs 1   Pain Rating: FLACC (Activity) - Activity 1   Pain Rating: FLACC (Activity) - Cry 2   Pain Rating: FLACC (Activity) - Consolability 0   Score: FLACC (Activity) 6   Restrictions/Precautions   RLE Weight Bearing Per Order NWB   Other Precautions WBS; Fall Risk;Pain   Subjective   Subjective Pt enocuntered seated in recliner, pleasant and agreeable to treatment  Reports controlled pain overall, but it is increased with activity  Pt motivated to participate in PT & is thankful for all assist he has recieved so far  Transfers   Sit to Stand 3  Moderate assistance   Additional items Assist x 1; Armrests; Increased time required  (+ standby for trunk support)   Stand to Sit 3  Moderate assistance   Additional items Assist x 1   Ambulation/Elevation   Gait pattern   (3 point gait pattern)   Gait Assistance 3  Moderate assist   Additional items Assist x 1  (+ skip follow)   Assistive Device Bariatric Rolling walker   Distance 5'   Balance   Static Sitting Fair +   Static Standing Poor +   Ambulatory Poor   Activity Tolerance   Activity Tolerance Patient tolerated treatment well;Patient limited by fatigue;Patient limited by pain   Nurse [address] [place] [address]  , RN   Assessment   Prognosis Good   Problem List Decreased strength;Decreased range of motion;Decreased endurance; Impaired balance;Decreased mobility;Pain;Orthopedic restrictions   Assessment pt demosntrated similar proviceincy with mobiltiy tasks this session, and was able to maintain WBS throughotu session while standing & ambulatory    Pt demonstrated safe foot clearance & RW management while hopping, but fatigues quickly due to strength & endurance deficits  Pt also has difficulty transitioning between arm rests & RW while performing sit to stand transfers due to the same  He is unable to maintain full upright standing posture due to chronic back pain that is likely exacerbated by acute injuries, which alos contributes to these same deficits  PT will continue to follwo and treat during hospital stay, focuing on surface to surface transfers & maximizing independence with use of RW & w/c to reduce burden of care until pt is cleared for increased activity  encourgaged pt to attempt RLE AROM to tolerace to improve mobility & reduce need for LE management as pain resides  PT POC & d/c recommendations remains appropriate at this time  Goals   Patient Goals to get better   STG Expiration Date 11/14/22   PT Treatment Day 1   Plan   Treatment/Interventions Functional transfer training;LE strengthening/ROM; Therapeutic exercise; Endurance training;Patient/family training;Equipment eval/education; Bed mobility;Gait training   Progress Progressing toward goals   PT Frequency 3-5x/wk   Recommendation   PT Discharge Recommendation Post acute rehabilitation services   Equipment Recommended Walker; Wheelchair   Wheelchair Package Recommended Heavy Duty (pt wt 250 lbs+)   Walker Package Recommended HD Bariatric wheeled walker   Lane Carpenter 435   Turning in Bed Without Bedrails 2   Lying on Back to Sitting on Edge of Flat Bed 2   Moving Bed to Chair 2   Standing Up From Chair 2   Walk in Room 1   Climb 3-5 Stairs 1   Basic Mobility Inpatient Raw Score 10   Turning Head Towards Sound 4   Follow Simple Instructions 4   Low Function Basic Mobility Raw Score 18   Low Function Basic Mobility Standardized Score 29 25   Highest Level Of Mobility   JH-HLM Goal 4: Move to chair/commode   JH-HLM Achieved 5: Stand (1 or more minutes)     Fernanda Signs    An [facility] Basic Mobility Standardized Score of 40 78 suggests pt would benefit from post acute rehab

## 2022-11-07 NOTE — PLAN OF CARE
Problem: PHYSICAL THERAPY ADULT  Goal: Performs mobility at highest level of function for planned discharge setting  See evaluation for individualized goals  Description: Treatment/Interventions: Functional transfer training, LE strengthening/ROM, Therapeutic exercise, Endurance training, Patient/family training, Equipment eval/education, Bed mobility, Gait training, Spoke to nursing, OT  Equipment Recommended: William Paget, Wheelchair       See flowsheet documentation for full assessment, interventions and recommendations  Outcome: Progressing  Note: Prognosis: Good  Problem List: Decreased strength, Decreased range of motion, Decreased endurance, Impaired balance, Decreased mobility, Pain, Orthopedic restrictions  Assessment: pt demosntrated similar proviceincy with mobiltiy tasks this session, and was able to maintain WBS throughotu session while standing & ambulatory  Pt demonstrated safe foot clearance & RW management while hopping, but fatigues quickly due to strength & endurance deficits  Pt also has difficulty transitioning between arm rests & RW while performing sit to stand transfers due to the same  He is unable to maintain full upright standing posture due to chronic back pain that is likely exacerbated by acute injuries, which alos contributes to these same deficits  PT will continue to follwo and treat during hospital stay, focuing on surface to surface transfers & maximizing independence with use of RW & w/c to reduce burden of care until pt is cleared for increased activity  encourgaged pt to attempt RLE AROM to tolerace to improve mobility & reduce need for LE management as pain resides  PT POC & d/c recommendations remains appropriate at this time  PT Discharge Recommendation: Post acute rehabilitation services    See flowsheet documentation for full assessment

## 2022-11-08 LAB
FLUAV RNA RESP QL NAA+PROBE: NEGATIVE
FLUBV RNA RESP QL NAA+PROBE: NEGATIVE
RSV RNA RESP QL NAA+PROBE: NEGATIVE
SARS-COV-2 RNA RESP QL NAA+PROBE: NEGATIVE

## 2022-11-08 RX ORDER — BISACODYL 10 MG
10 SUPPOSITORY, RECTAL RECTAL DAILY PRN
Status: DISCONTINUED | OUTPATIENT
Start: 2022-11-08 | End: 2022-11-09 | Stop reason: HOSPADM

## 2022-11-08 RX ORDER — BISACODYL 10 MG
10 SUPPOSITORY, RECTAL RECTAL DAILY PRN
Status: DISCONTINUED | OUTPATIENT
Start: 2022-11-08 | End: 2022-11-08

## 2022-11-08 RX ORDER — DIAPER,BRIEF,INFANT-TODD,DISP
EACH MISCELLANEOUS 4 TIMES DAILY PRN
Status: DISCONTINUED | OUTPATIENT
Start: 2022-11-08 | End: 2022-11-09 | Stop reason: HOSPADM

## 2022-11-08 RX ORDER — OXYCODONE HYDROCHLORIDE 5 MG/1
TABLET ORAL
Qty: 20 TABLET | Refills: 0 | Status: SHIPPED | OUTPATIENT
Start: 2022-11-08

## 2022-11-08 RX ADMIN — OXYCODONE HYDROCHLORIDE 2.5 MG: 5 TABLET ORAL at 13:11

## 2022-11-08 RX ADMIN — ROPINIROLE HYDROCHLORIDE 1 MG: 1 TABLET, FILM COATED ORAL at 21:27

## 2022-11-08 RX ADMIN — DOCUSATE SODIUM 100 MG: 100 CAPSULE, LIQUID FILLED ORAL at 08:20

## 2022-11-08 RX ADMIN — FLUTICASONE PROPIONATE 1 SPRAY: 50 SPRAY, METERED NASAL at 08:22

## 2022-11-08 RX ADMIN — ATORVASTATIN CALCIUM 40 MG: 40 TABLET, FILM COATED ORAL at 17:09

## 2022-11-08 RX ADMIN — EZETIMIBE 10 MG: 10 TABLET ORAL at 21:27

## 2022-11-08 RX ADMIN — DOCUSATE SODIUM 100 MG: 100 CAPSULE, LIQUID FILLED ORAL at 17:09

## 2022-11-08 RX ADMIN — ACETAMINOPHEN 975 MG: 325 TABLET ORAL at 21:27

## 2022-11-08 RX ADMIN — HYDROCORTISONE 1 APPLICATION: 1 CREAM TOPICAL at 21:33

## 2022-11-08 RX ADMIN — Medication 1 TABLET: at 08:20

## 2022-11-08 RX ADMIN — SENNOSIDES 8.6 MG: 8.6 TABLET, FILM COATED ORAL at 08:20

## 2022-11-08 RX ADMIN — ENOXAPARIN SODIUM 30 MG: 30 INJECTION SUBCUTANEOUS at 21:27

## 2022-11-08 RX ADMIN — ENOXAPARIN SODIUM 30 MG: 30 INJECTION SUBCUTANEOUS at 08:20

## 2022-11-08 RX ADMIN — OXYCODONE HYDROCHLORIDE 2.5 MG: 5 TABLET ORAL at 21:28

## 2022-11-08 RX ADMIN — TAMSULOSIN HYDROCHLORIDE 0.4 MG: 0.4 CAPSULE ORAL at 17:09

## 2022-11-08 RX ADMIN — ACETAMINOPHEN 975 MG: 325 TABLET ORAL at 13:09

## 2022-11-08 RX ADMIN — METHOCARBAMOL 500 MG: 500 TABLET ORAL at 05:21

## 2022-11-08 RX ADMIN — Medication 12.5 MG: at 21:27

## 2022-11-08 RX ADMIN — METHOCARBAMOL 500 MG: 500 TABLET ORAL at 21:27

## 2022-11-08 RX ADMIN — POLYETHYLENE GLYCOL 3350 17 G: 17 POWDER, FOR SOLUTION ORAL at 11:02

## 2022-11-08 RX ADMIN — OXYCODONE HYDROCHLORIDE 5 MG: 5 TABLET ORAL at 05:20

## 2022-11-08 RX ADMIN — METHOCARBAMOL 500 MG: 500 TABLET ORAL at 13:09

## 2022-11-08 RX ADMIN — ACETAMINOPHEN 975 MG: 325 TABLET ORAL at 05:21

## 2022-11-08 RX ADMIN — OXYCODONE HYDROCHLORIDE 2.5 MG: 5 TABLET ORAL at 17:09

## 2022-11-08 RX ADMIN — Medication 12.5 MG: at 08:20

## 2022-11-08 RX ADMIN — ASPIRIN 81 MG: 81 TABLET, COATED ORAL at 08:20

## 2022-11-08 RX ADMIN — BISACODYL 10 MG: 10 SUPPOSITORY RECTAL at 13:09

## 2022-11-08 NOTE — PLAN OF CARE
Problem: Potential for Falls  Goal: Patient will remain free of falls  Description: INTERVENTIONS:  - Educate patient/family on patient safety including physical limitations  - Instruct patient to call for assistance with activity   - Consult OT/PT to assist with strengthening/mobility   - Keep Call bell within reach  - Keep bed low and locked with side rails adjusted as appropriate  - Keep care items and personal belongings within reach  - Initiate and maintain comfort rounds  - Make Fall Risk Sign visible to staff  - Offer Toileting every  Hours, in advance of need  - Initiate/Maintain alarm  - Obtain necessary fall risk management equipment:   - Apply yellow socks and bracelet for high fall risk patients  - Consider moving patient to room near nurses station  Outcome: Progressing     Problem: PAIN - ADULT  Goal: Verbalizes/displays adequate comfort level or baseline comfort level  Description: Interventions:  - Encourage patient to monitor pain and request assistance  - Assess pain using appropriate pain scale  - Administer analgesics based on type and severity of pain and evaluate response  - Implement non-pharmacological measures as appropriate and evaluate response  - Consider cultural and social influences on pain and pain management  - Notify physician/advanced practitioner if interventions unsuccessful or patient reports new pain  Outcome: Progressing     Problem: SAFETY ADULT  Goal: Patient will remain free of falls  Description: INTERVENTIONS:  - Educate patient/family on patient safety including physical limitations  - Instruct patient to call for assistance with activity   - Consult OT/PT to assist with strengthening/mobility   - Keep Call bell within reach  - Keep bed low and locked with side rails adjusted as appropriate  - Keep care items and personal belongings within reach  - Initiate and maintain comfort rounds  - Make Fall Risk Sign visible to staff  - Offer Toileting every  Hours, in advance of need  - Initiate/Maintain alarm  - Obtain necessary fall risk management equipment:   - Apply yellow socks and bracelet for high fall risk patients  - Consider moving patient to room near nurses station  Outcome: Progressing  Goal: Maintain or return to baseline ADL function  Description: INTERVENTIONS:  -  Assess patient's ability to carry out ADLs; assess patient's baseline for ADL function and identify physical deficits which impact ability to perform ADLs (bathing, care of mouth/teeth, toileting, grooming, dressing, etc )  - Assess/evaluate cause of self-care deficits   - Assess range of motion  - Assess patient's mobility; develop plan if impaired  - Assess patient's need for assistive devices and provide as appropriate  - Encourage maximum independence but intervene and supervise when necessary  - Involve family in performance of ADLs  - Assess for home care needs following discharge   - Consider OT consult to assist with ADL evaluation and planning for discharge  - Provide patient education as appropriate  Outcome: Progressing  Goal: Maintains/Returns to pre admission functional level  Description: INTERVENTIONS:  - Perform BMAT or MOVE assessment daily    - Set and communicate daily mobility goal to care team and patient/family/caregiver  - Collaborate with rehabilitation services on mobility goals if consulted  - Perform Range of Motion  times a day  - Reposition patient every  hours    - Dangle patient  times a day  - Stand patient  times a day  - Ambulate patient  times a day  - Out of bed to chair  times a day   - Out of bed for meals  times a day  - Out of bed for toileting  - Record patient progress and toleration of activity level   Outcome: Progressing     Problem: DISCHARGE PLANNING  Goal: Discharge to home or other facility with appropriate resources  Description: INTERVENTIONS:  - Identify barriers to discharge w/patient and caregiver  - Arrange for needed discharge resources and transportation as appropriate  - Identify discharge learning needs (meds, wound care, etc )  - Arrange for interpretive services to assist at discharge as needed  - Refer to Case Management Department for coordinating discharge planning if the patient needs post-hospital services based on physician/advanced practitioner order or complex needs related to functional status, cognitive ability, or social support system  Outcome: Progressing     Problem: Knowledge Deficit  Goal: Patient/family/caregiver demonstrates understanding of disease process, treatment plan, medications, and discharge instructions  Description: Complete learning assessment and assess knowledge base    Interventions:  - Provide teaching at level of understanding  - Provide teaching via preferred learning methods  Outcome: Progressing     Problem: Prexisting or High Potential for Compromised Skin Integrity  Goal: Skin integrity is maintained or improved  Description: INTERVENTIONS:  - Identify patients at risk for skin breakdown  - Assess and monitor skin integrity  - Assess and monitor nutrition and hydration status  - Monitor labs   - Assess for incontinence   - Turn and reposition patient  - Assist with mobility/ambulation  - Relieve pressure over bony prominences  - Avoid friction and shearing  - Provide appropriate hygiene as needed including keeping skin clean and dry  - Evaluate need for skin moisturizer/barrier cream  - Collaborate with interdisciplinary team   - Patient/family teaching  - Consider wound care consult   Outcome: Progressing     Problem: MOBILITY - ADULT  Goal: Maintain or return to baseline ADL function  Description: INTERVENTIONS:  -  Assess patient's ability to carry out ADLs; assess patient's baseline for ADL function and identify physical deficits which impact ability to perform ADLs (bathing, care of mouth/teeth, toileting, grooming, dressing, etc )  - Assess/evaluate cause of self-care deficits   - Assess range of motion  - Assess patient's mobility; develop plan if impaired  - Assess patient's need for assistive devices and provide as appropriate  - Encourage maximum independence but intervene and supervise when necessary  - Involve family in performance of ADLs  - Assess for home care needs following discharge   - Consider OT consult to assist with ADL evaluation and planning for discharge  - Provide patient education as appropriate  Outcome: Progressing  Goal: Maintains/Returns to pre admission functional level  Description: INTERVENTIONS:  - Perform BMAT or MOVE assessment daily    - Set and communicate daily mobility goal to care team and patient/family/caregiver  - Collaborate with rehabilitation services on mobility goals if consulted  - Perform Range of Motion  times a day  - Reposition patient every  hours    - Dangle patient  times a day  - Stand patient  times a day  - Ambulate patient  times a day  - Out of bed to chair  times a day   - Out of bed for meals times a day  - Out of bed for toileting  - Record patient progress and toleration of activity level   Outcome: Progressing     Problem: MUSCULOSKELETAL - ADULT  Goal: Maintain or return mobility to safest level of function  Description: INTERVENTIONS:  - Assess patient's ability to carry out ADLs; assess patient's baseline for ADL function and identify physical deficits which impact ability to perform ADLs (bathing, care of mouth/teeth, toileting, grooming, dressing, etc )  - Assess/evaluate cause of self-care deficits   - Assess range of motion  - Assess patient's mobility  - Assess patient's need for assistive devices and provide as appropriate  - Encourage maximum independence but intervene and supervise when necessary  - Involve family in performance of ADLs  - Assess for home care needs following discharge   - Consider OT consult to assist with ADL evaluation and planning for discharge  - Provide patient education as appropriate  Outcome: Progressing  Goal: Maintain proper alignment of affected body part  Description: INTERVENTIONS:  - Support, maintain and protect limb and body alignment  - Provide patient/ family with appropriate education  Outcome: Progressing

## 2022-11-08 NOTE — CASE MANAGEMENT
Case Management Discharge Planning Note    Patient name Ada Alonso  Location [facility] 628/[facility] 291-32 MRN [id number]  : 1945 Date 2022       Current Admission Date: 2022  Current Admission Diagnosis:Periprosthetic fracture of knee   Patient Active Problem List    Diagnosis Date Noted   • Periprosthetic fracture of knee 2022      LOS (days): 6  Geometric Mean LOS (GMLOS) (days): 4 40  Days to GMLOS:-1 1     OBJECTIVE:  Risk of Unplanned Readmission Score: 14 2      BPCI Notification Given?: Yes (Discussed bundle payment and continuity of care  SL facilities offered to patient, patient opting to do STR closer to his home in [facility])  Current admission status: Inpatient   Preferred Pharmacy:   [facility] [address] [facility] GEORGE [address] [place] [address] Donny Palma  [address] [address]  Phone: [phone] Fax: [phone]    Primary Care Provider: Rosemary Pierce MD    Primary Insurance: MEDICARE  Secondary Insurance: Aileen Ordonez    DISCHARGE DETAILS:    Discharge planning discussed with[de-identified] Patient  Freedom of Choice: Yes  Comments - Freedom of Choice: Discussed FOC    Other Referral/Resources/Interventions Provided:  Interventions: Short Term Rehab  Referral Comments: Per Shreyas Keller for [facility] rehab, patient is appropriate and can transition to [facility] today, patient making wife aware  Transport at Discharge : [facility]  Dispatcher Contacted: Yes  Number/Name of Dispatcher: Juan Will and Unit #):  COLETTE  ETA of Transport (Date): 22  ETA of Transport (Time): Dieudonne Ledesma Name, [address] : [facility]  Receiving Facility/Agency Phone Number: [phone]  Facility/Agency Fax Number: [phone]

## 2022-11-08 NOTE — PLAN OF CARE
Problem: Potential for Falls  Goal: Patient will remain free of falls  Description: INTERVENTIONS:  - Educate patient/family on patient safety including physical limitations  - Instruct patient to call for assistance with activity   - Consult OT/PT to assist with strengthening/mobility   - Keep Call bell within reach  - Keep bed low and locked with side rails adjusted as appropriate  - Keep care items and personal belongings within reach  - Initiate and maintain comfort rounds  - Make Fall Risk Sign visible to staff  - Offer Toileting every 2 Hours, in advance of need  - Initiate/Maintain on alarm  - Obtain necessary fall risk management equipment:   Problem: PAIN - ADULT  Goal: Verbalizes/displays adequate comfort level or baseline comfort level  Description: Interventions:  - Encourage patient to monitor pain and request assistance  - Assess pain using appropriate pain scale  - Administer analgesics based on type and severity of pain and evaluate response  - Implement non-pharmacological measures as appropriate and evaluate response  - Consider cultural and social influences on pain and pain management  - Notify physician/advanced practitioner if interventions unsuccessful or patient reports new pain  Outcome: Progressing     Problem: SAFETY ADULT  Goal: Patient will remain free of falls  Description: INTERVENTIONS:  - Educate patient/family on patient safety including physical limitations  - Instruct patient to call for assistance with activity   - Consult OT/PT to assist with strengthening/mobility   - Keep Call bell within reach  - Keep bed low and locked with side rails adjusted as appropriate  - Keep care items and personal belongings within reach  - Initiate and maintain comfort rounds  - Make Fall Risk Sign visible to staff  - Offer Toileting every 2 Hours, in advance of need  - Initiate/Maintain on alarm  - Obtain necessary fall risk management equipment:  - Apply yellow socks and bracelet for high fall risk patients  - Consider moving patient to room near nurses station  Outcome: Progressing  Goal: Maintain or return to baseline ADL function  Description: INTERVENTIONS:  -  Assess patient's ability to carry out ADLs; assess patient's baseline for ADL function and identify physical deficits which impact ability to perform ADLs (bathing, care of mouth/teeth, toileting, grooming, dressing, etc )  - Assess/evaluate cause of self-care deficits   - Assess range of motion  - Assess patient's mobility; develop plan if impaired  - Assess patient's need for assistive devices and provide as appropriate  - Encourage maximum independence but intervene and supervise when necessary  - Involve family in performance of ADLs  - Assess for home care needs following discharge   - Consider OT consult to assist with ADL evaluation and planning for discharge  - Provide patient education as appropriate  Outcome: Progressing  Goal: Maintains/Returns to pre admission functional level  Description: INTERVENTIONS:  - Perform BMAT or MOVE assessment daily    - Set and communicate daily mobility goal to care team and patient/family/caregiver  - Collaborate with rehabilitation services on mobility goals if consulted  - Perform Range of Motion 3 times a day  - Reposition patient every 2 hours    - Dangle patient 3 times a day  - Stand patient 3 times a day  - Ambulate patient 3 times a day  - Out of bed to chair 3 times a day   - Out of bed for meals 3  Problem: DISCHARGE PLANNING  Goal: Discharge to home or other facility with appropriate resources  Description: INTERVENTIONS:  - Identify barriers to discharge w/patient and caregiver  - Arrange for needed discharge resources and transportation as appropriate  - Identify discharge learning needs (meds, wound care, etc )  - Arrange for interpretive services to assist at discharge as needed  - Refer to Case Management Department for coordinating discharge planning if the patient needs post-hospital services based on physician/advanced practitioner order or complex needs related to functional status, cognitive ability, or social support system  Outcome: Progressing     Problem: Knowledge Deficit  Goal: Patient/family/caregiver demonstrates understanding of disease process, treatment plan, medications, and discharge instructions  Description: Complete learning assessment and assess knowledge base    Interventions:  - Provide teaching at level of understanding  - Provide teaching via preferred learning methods  Outcome: Progressing     Problem: MUSCULOSKELETAL - ADULT  Goal: Maintain or return mobility to safest level of function  Description: INTERVENTIONS:  - Assess patient's ability to carry out ADLs; assess patient's baseline for ADL function and identify physical deficits which impact ability to perform ADLs (bathing, care of mouth/teeth, toileting, grooming, dressing, etc )  - Assess/evaluate cause of self-care deficits   - Assess range of motion  - Assess patient's mobility  - Assess patient's need for assistive devices and provide as appropriate  - Encourage maximum independence but intervene and supervise when necessary  - Involve family in performance of ADLs  - Assess for home care needs following discharge   - Consider OT consult to assist with ADL evaluation and planning for discharge  - Provide patient education as appropriate  Outcome: Progressing  Goal: Maintain proper alignment of affected body part  Description: INTERVENTIONS:  - Support, maintain and protect limb and body alignment  - Provide patient/ family with appropriate education  Outcome: Progressing    times a day  - Out of bed for toileting  - Record patient progress and toleration of activity level   Outcome: Progressing     - Apply yellow socks and bracelet for high fall risk patients  - Consider moving patient to room near nurses station  Outcome: Progressing

## 2022-11-08 NOTE — PROGRESS NOTES
[address] [facility]  Progress Note - Earl Molina 1945, 77 y o  male MRN: [id number]  Unit/Bed#: [facility] 628-01 Encounter: [id number]  Primary Care Provider: Keira Flowers MD   Date and time admitted to hospital: 11/2/2022  8:59 PM    * Periprosthetic fracture of knee  Assessment & Plan  - s/p ORIF R knee ORIF:   - Non weight bearing  right lower extremity   - clear for discharge, will follow up in 2 weeks with Ortho  - APS recommendations:   - Right-sided single shot femoral nerve block with local anesthetic is functioning appropriately, patient does report some mild sensory deficit the medial aspect of his right knee as well as some mild motor deficit which has been resolving  Pain continues to be well controlled  - Tylenol 975 mg every 8 hours scheduled   - Robaxin 500 mg every 8 hours, for muscle spasms   - Oxycodone 2 5 mg every 4 hours as needed, for moderate pain   - Oxycodone 5 mg every 4 hours as needed for severe pain    - Colace 100 mg twice daily  - PT recommended Acute Rehab, placement pending  - medically appropriate for discharge once accepted        Bowel Regimen: colace, senna   VTE Prophylaxis:Enoxaparin (Lovenox)     Disposition: rehab, placement pending     Subjective   Chief Complaint: Leg pain    Subjective: "Im feeling great just eager to go"     Objective   Vitals:   Temp:  [98 °F (36 7 °C)-99 8 °F (37 7 °C)] 98 °F (36 7 °C)  HR:  [] 90  Resp:  [16-18] 16  BP: (116-128)/(59-71) 128/71    I/O       11/06 0701  11/07 0700 11/07 0701  11/08 0700 11/08 0701  11/09 0700    P  O  1480 1320     Total Intake(mL/kg) 1480 (11 3) 1320 (10 1)     Urine (mL/kg/hr) 2825 (0 9) 2450 (0 8)     Total Output 2825 2450     Net -1345 -1130            Unmeasured Urine Occurrence 1 x             Physical Exam:   GENERAL APPEARANCE: NAD  NEURO: AAOx3, no focal deficits  HEENT: normocephalic, atraumatic, MMM  CV: RRR  LUNGS: No distress, room air   GI: soft nontender   : normal  MSK: no deformity, bearing weight as tolerated  SKIN: warm, dry, back rash resolved     Invasive Devices  Report    Peripheral Intravenous Line  Duration           Peripheral IV 11/07/22 Left Antecubital <1 day                      Lab Results: n/a  Imaging: n/a   Other Studies: n/a

## 2022-11-08 NOTE — DISCHARGE SUMMARY
Discharge Summary - Dianna George 77 y o  male MRN: [id number]    [facility] Encounter: [id number]    Admission Date:   Admission Orders (From admission, onward)     Ordered        11/02/22 2201  Inpatient Admission  Once                        Admitting Diagnosis: Unspecified multiple injuries, initial encounter [T07  XXXA]  Periprosthetic fracture of knee [id number]  [id number]]    HPI:  As per Umm Iniguez MD on 11/2, " Dianna George is a 77 y o  male who presents s/p slip and fall off embankment while fishing with R leg pain  Pain located to R distal femur and knee  +headstrike, -LOC  Trauma workup at [facility]) [place] was negative aside from R knee periprosthetic fx  Pt is a community ambulator and baseline, takes ASA 81 mg QD  Is retired  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 at [facility], R knee 2 yrs ago, L knee 3 yrs ago "    Procedures Performed: No orders of the defined types were placed in this encounter  Summary of Hospital Course: The patient was admitted to the hospital, underwent ORIF  of his fracture, and postoperatively followed a stable course  He was bearing weight as tolerated, working diligently with Physical and Occupational therapy postoperatively, and was maintained on Lovenox DVT prophylaxis given his traumatic injury  He tolerated a diet, his pain was well controlled, and postoperative serial examinations on postoperative days 1 through 5 were reassuring  11/7 he complained of an acute morbilliform rash which was transient, treated with Benadryl  He did have acute blood loss anemia managed without transfusion, serial hemoglobin assessment on 11/06 was stable  He was deemed suitable for discharge on 11/7 and rehab placement efforts were underway  On 11/08, he was successfully placed for rehab at [facility], and prior to departure from the hospital, all provisions for orthopaedic surgical follow-up, medication reconciliation and discharge instructions were made      Significant Findings, Care, Treatment and Services Provided:     11/5 RLE ORIF    XR Trauma chest portable    Result Date: 11/2/2022  Impression: No acute cardiopulmonary disease  Workstation performed: [id number]     XR femur 2 views RIGHT    Result Date: 11/2/2022  Impression: Acute comminuted distal femoral periprosthetic fracture  No acute osseous abnormality in the proximal femur, tibia-fibula, or ankle  Workstation performed: [id number]     XR knee 1 or 2 vw right    Result Date: 11/3/2022  Impression: Fluoroscopic guidance provided for procedure guidance  Please refer to the separate procedure notes for additional details  Workstation performed: [id number]     XR knee 1 or 2 vw right    Result Date: 11/3/2022  Impression: Postoperative right knee as noted Workstation performed: [id number]     XR knee 4+ vw right injury    Result Date: 11/2/2022  Impression: Acute comminuted distal femoral periprosthetic fracture  No acute osseous abnormality in the proximal femur, tibia-fibula, or ankle  Workstation performed: [id number]     XR tibia fibula 2 views RIGHT    Result Date: 11/2/2022  Impression: Acute comminuted distal femoral periprosthetic fracture  No acute osseous abnormality in the proximal femur, tibia-fibula, or ankle  Workstation performed: [id number]     XR ankle 3+ views RIGHT    Result Date: 11/2/2022  Impression: Acute comminuted distal femoral periprosthetic fracture  No acute osseous abnormality in the proximal femur, tibia-fibula, or ankle  Workstation performed: [id number]     TRAUMA - CT head wo contrast    Result Date: 11/2/2022  Impression: No acute intracranial abnormality  The study was marked in [facility] for immediate notification  Workstation performed: [id number]     TRAUMA - CT spine cervical wo contrast    Result Date: 11/2/2022  Impression: No cervical spine fracture or traumatic malalignment  The study was marked in [facility] for immediate notification   Workstation performed: [id number]     XR Trauma pelvis ap only 1 or 2 vw    Result Date: 11/2/2022  Impression: No acute osseous abnormality  Degenerative changes as described  Workstation performed: [id number]     TRAUMA - CT chest abdomen pelvis w contrast    Result Date: 11/2/2022  Impression: No acute traumatic injury in the chest, abdomen, or pelvis  The study was marked in [facility] for immediate notification  Workstation performed: [id number]     CT lower extremity wo contrast right    Result Date: 11/2/2022  Impression: Acute comminuted distal femoral periprosthetic fracture, as described  Workstation performed: [id number]       Complications: none      Discharge Diagnosis: right periprosthetic femoral fracture     Medical Problems             Resolved Problems  Date Reviewed: 11/8/2022   None                 Condition at Discharge: good         Discharge instructions/Information to patient and family:   See after visit summary for information provided to patient and family  Provisions for Follow-Up Care:  See after visit summary for information related to follow-up care and any pertinent home health orders  PCP: Alain Hamman, MD    Disposition: Short-term rehab at [facility]    Planned Readmission: No      Discharge Statement   I spent 27 minutes discharging the patient  This time was spent on the day of discharge  I had direct contact with the patient on the day of discharge  Additional documentation is required if more than 30 minutes were spent on discharge  Discharge Medications:  See after visit summary for reconciled discharge medications provided to patient and family

## 2022-11-09 VITALS
HEART RATE: 86 BPM | WEIGHT: 287.92 LBS | DIASTOLIC BLOOD PRESSURE: 74 MMHG | TEMPERATURE: 97.7 F | HEIGHT: 73 IN | SYSTOLIC BLOOD PRESSURE: 117 MMHG | OXYGEN SATURATION: 97 % | RESPIRATION RATE: 16 BRPM | BODY MASS INDEX: 38.16 KG/M2

## 2022-11-09 RX ORDER — DIAPER,BRIEF,INFANT-TODD,DISP
EACH MISCELLANEOUS 4 TIMES DAILY PRN
Qty: 30 G | Refills: 0
Start: 2022-11-09

## 2022-11-09 RX ADMIN — FLUTICASONE PROPIONATE 1 SPRAY: 50 SPRAY, METERED NASAL at 08:13

## 2022-11-09 RX ADMIN — ACETAMINOPHEN 975 MG: 325 TABLET ORAL at 06:05

## 2022-11-09 RX ADMIN — Medication 12.5 MG: at 08:13

## 2022-11-09 RX ADMIN — ASPIRIN 81 MG: 81 TABLET, COATED ORAL at 08:13

## 2022-11-09 RX ADMIN — Medication 1 TABLET: at 08:13

## 2022-11-09 RX ADMIN — METHOCARBAMOL 500 MG: 500 TABLET ORAL at 12:09

## 2022-11-09 RX ADMIN — METHOCARBAMOL 500 MG: 500 TABLET ORAL at 06:05

## 2022-11-09 RX ADMIN — ENOXAPARIN SODIUM 30 MG: 30 INJECTION SUBCUTANEOUS at 08:13

## 2022-11-09 RX ADMIN — SENNOSIDES 8.6 MG: 8.6 TABLET, FILM COATED ORAL at 08:14

## 2022-11-09 RX ADMIN — DOCUSATE SODIUM 100 MG: 100 CAPSULE, LIQUID FILLED ORAL at 08:13

## 2022-11-09 RX ADMIN — GLYCERIN 2 DROP: .002; .002; .01 SOLUTION/ DROPS OPHTHALMIC at 08:13

## 2022-11-09 RX ADMIN — ACETAMINOPHEN 975 MG: 325 TABLET ORAL at 12:09

## 2022-11-09 RX ADMIN — OXYCODONE HYDROCHLORIDE 5 MG: 5 TABLET ORAL at 10:11

## 2022-11-09 NOTE — PLAN OF CARE
Problem: Potential for Falls  Goal: Patient will remain free of falls  Description: INTERVENTIONS:  - Educate patient/family on patient safety including physical limitations  - Instruct patient to call for assistance with activity   - Consult OT/PT to assist with strengthening/mobility   - Keep Call bell within reach  - Keep bed low and locked with side rails adjusted as appropriate  - Keep care items and personal belongings within reach  - Initiate and maintain comfort rounds  - Make Fall Risk Sign visible to staff  - Offer Toileting every 2 Hours, in advance of need  - Initiate/Maintain alarm  - Obtain necessary fall risk management equipment:   - Apply yellow socks and bracelet for high fall risk patients  - Consider moving patient to room near nurses station  Outcome: Completed     Problem: PAIN - ADULT  Goal: Verbalizes/displays adequate comfort level or baseline comfort level  Description: Interventions:  - Encourage patient to monitor pain and request assistance  - Assess pain using appropriate pain scale  - Administer analgesics based on type and severity of pain and evaluate response  - Implement non-pharmacological measures as appropriate and evaluate response  - Consider cultural and social influences on pain and pain management  - Notify physician/advanced practitioner if interventions unsuccessful or patient reports new pain  Outcome: Completed     Problem: SAFETY ADULT  Goal: Patient will remain free of falls  Description: INTERVENTIONS:  - Educate patient/family on patient safety including physical limitations  - Instruct patient to call for assistance with activity   - Consult OT/PT to assist with strengthening/mobility   - Keep Call bell within reach  - Keep bed low and locked with side rails adjusted as appropriate  - Keep care items and personal belongings within reach  - Initiate and maintain comfort rounds  - Make Fall Risk Sign visible to staff  - Offer Toileting every 2 Hours, in advance of need  - Initiate/Maintain alarm  - Obtain necessary fall risk management equipment:   - Apply yellow socks and bracelet for high fall risk patients  - Consider moving patient to room near nurses station  Outcome: Completed  Goal: Maintain or return to baseline ADL function  Description: INTERVENTIONS:  -  Assess patient's ability to carry out ADLs; assess patient's baseline for ADL function and identify physical deficits which impact ability to perform ADLs (bathing, care of mouth/teeth, toileting, grooming, dressing, etc )  - Assess/evaluate cause of self-care deficits   - Assess range of motion  - Assess patient's mobility; develop plan if impaired  - Assess patient's need for assistive devices and provide as appropriate  - Encourage maximum independence but intervene and supervise when necessary  - Involve family in performance of ADLs  - Assess for home care needs following discharge   - Consider OT consult to assist with ADL evaluation and planning for discharge  - Provide patient education as appropriate  Outcome: Completed  Goal: Maintains/Returns to pre admission functional level  Description: INTERVENTIONS:  - Perform BMAT or MOVE assessment daily    - Set and communicate daily mobility goal to care team and patient/family/caregiver  - Collaborate with rehabilitation services on mobility goals if consulted  - Perform Range of Motion 3 times a day  - Reposition patient every 2 hours    - Dangle patient 3 times a day  - Stand patient 3 times a day  - Ambulate patient 3 times a day  - Out of bed to chair 3 times a day   - Out of bed for meals 3 times a day  - Out of bed for toileting  - Record patient progress and toleration of activity level   Outcome: Completed     Problem: DISCHARGE PLANNING  Goal: Discharge to home or other facility with appropriate resources  Description: INTERVENTIONS:  - Identify barriers to discharge w/patient and caregiver  - Arrange for needed discharge resources and transportation as appropriate  - Identify discharge learning needs (meds, wound care, etc )  - Arrange for interpretive services to assist at discharge as needed  - Refer to Case Management Department for coordinating discharge planning if the patient needs post-hospital services based on physician/advanced practitioner order or complex needs related to functional status, cognitive ability, or social support system  Outcome: Completed     Problem: Knowledge Deficit  Goal: Patient/family/caregiver demonstrates understanding of disease process, treatment plan, medications, and discharge instructions  Description: Complete learning assessment and assess knowledge base    Interventions:  - Provide teaching at level of understanding  - Provide teaching via preferred learning methods  Outcome: Completed     Problem: Prexisting or High Potential for Compromised Skin Integrity  Goal: Skin integrity is maintained or improved  Description: INTERVENTIONS:  - Identify patients at risk for skin breakdown  - Assess and monitor skin integrity  - Assess and monitor nutrition and hydration status  - Monitor labs   - Assess for incontinence   - Turn and reposition patient  - Assist with mobility/ambulation  - Relieve pressure over bony prominences  - Avoid friction and shearing  - Provide appropriate hygiene as needed including keeping skin clean and dry  - Evaluate need for skin moisturizer/barrier cream  - Collaborate with interdisciplinary team   - Patient/family teaching  - Consider wound care consult   Outcome: Completed     Problem: MOBILITY - ADULT  Goal: Maintain or return to baseline ADL function  Description: INTERVENTIONS:  -  Assess patient's ability to carry out ADLs; assess patient's baseline for ADL function and identify physical deficits which impact ability to perform ADLs (bathing, care of mouth/teeth, toileting, grooming, dressing, etc )  - Assess/evaluate cause of self-care deficits   - Assess range of motion  - Assess patient's mobility; develop plan if impaired  - Assess patient's need for assistive devices and provide as appropriate  - Encourage maximum independence but intervene and supervise when necessary  - Involve family in performance of ADLs  - Assess for home care needs following discharge   - Consider OT consult to assist with ADL evaluation and planning for discharge  - Provide patient education as appropriate  Outcome: Completed  Goal: Maintains/Returns to pre admission functional level  Description: INTERVENTIONS:  - Perform BMAT or MOVE assessment daily    - Set and communicate daily mobility goal to care team and patient/family/caregiver  - Collaborate with rehabilitation services on mobility goals if consulted  - Perform Range of Motion 3 times a day  - Reposition patient every 2 hours    - Dangle patient 3 times a day  - Stand patient 3 times a day  - Ambulate patient 3 times a day  - Out of bed to chair 3 times a day   - Out of bed for meals 3 times a day  - Out of bed for toileting  - Record patient progress and toleration of activity level   Outcome: Completed     Problem: MUSCULOSKELETAL - ADULT  Goal: Maintain or return mobility to safest level of function  Description: INTERVENTIONS:  - Assess patient's ability to carry out ADLs; assess patient's baseline for ADL function and identify physical deficits which impact ability to perform ADLs (bathing, care of mouth/teeth, toileting, grooming, dressing, etc )  - Assess/evaluate cause of self-care deficits   - Assess range of motion  - Assess patient's mobility  - Assess patient's need for assistive devices and provide as appropriate  - Encourage maximum independence but intervene and supervise when necessary  - Involve family in performance of ADLs  - Assess for home care needs following discharge   - Consider OT consult to assist with ADL evaluation and planning for discharge  - Provide patient education as appropriate  Outcome: Completed  Goal: Maintain proper alignment of affected body part  Description: INTERVENTIONS:  - Support, maintain and protect limb and body alignment  - Provide patient/ family with appropriate education  Outcome: Completed

## 2022-11-09 NOTE — PLAN OF CARE
Problem: Potential for Falls  Goal: Patient will remain free of falls  Description: INTERVENTIONS:  - Educate patient/family on patient safety including physical limitations  - Instruct patient to call for assistance with activity   - Consult OT/PT to assist with strengthening/mobility   - Keep Call bell within reach  - Keep bed low and locked with side rails adjusted as appropriate  - Keep care items and personal belongings within reach  - Initiate and maintain comfort rounds  - Make Fall Risk Sign visible to staff  - Offer Toileting every 2 Hours, in advance of need  - Initiate/Maintain on alarm  - Obtain necessary fall risk management equipment:  Problem: PAIN - ADULT  Goal: Verbalizes/displays adequate comfort level or baseline comfort level  Description: Interventions:  - Encourage patient to monitor pain and request assistance  - Assess pain using appropriate pain scale  - Administer analgesics based on type and severity of pain and evaluate response  - Implement non-pharmacological measures as appropriate and evaluate response  - Consider cultural and social influences on pain and pain management  - Notify physician/advanced practitioner if interventions unsuccessful or patient reports new pain  Outcome: Progressing     Problem: SAFETY ADULT  Goal: Patient will remain free of falls  Description: INTERVENTIONS:  - Educate patient/family on patient safety including physical limitations  - Instruct patient to call for assistance with activity   - Consult OT/PT to assist with strengthening/mobility   - Keep Call bell within reach  - Keep bed low and locked with side rails adjusted as appropriate  - Keep care items and personal belongings within reach  - Initiate and maintain comfort rounds  - Make Fall Risk Sign visible to staff  - Offer Toileting every 2 Hours, in advance of need  - Initiate/Maintain on   Problem: MUSCULOSKELETAL - ADULT  Goal: Maintain or return mobility to safest level of function  Description: INTERVENTIONS:  - Assess patient's ability to carry out ADLs; assess patient's baseline for ADL function and identify physical deficits which impact ability to perform ADLs (bathing, care of mouth/teeth, toileting, grooming, dressing, etc )  - Assess/evaluate cause of self-care deficits   - Assess range of motion  - Assess patient's mobility  - Assess patient's need for assistive devices and provide as appropriate  - Encourage maximum independence but intervene and supervise when necessary  - Involve family in performance of ADLs  - Assess for home care needs following discharge   - Consider OT consult to assist with ADL evaluation and planning for discharge  - Provide patient education as appropriate  Outcome: Progressing  Goal: Maintain proper alignment of affected body part  Description: INTERVENTIONS:  - Support, maintain and protect limb and body alignment  - Provide patient/ family with appropriate education  Outcome: Progressing   alarm  - Obtain necessary fall risk management equipment:   - Apply yellow socks and bracelet for high fall risk patients  - Consider moving patient to room near nurses station  Outcome: Progressing  Goal: Maintain or return to baseline ADL function  Description: INTERVENTIONS:  -  Assess patient's ability to carry out ADLs; assess patient's baseline for ADL function and identify physical deficits which impact ability to perform ADLs (bathing, care of mouth/teeth, toileting, grooming, dressing, etc )  - Assess/evaluate cause of self-care deficits   - Assess range of motion  - Assess patient's mobility; develop plan if impaired  - Assess patient's need for assistive devices and provide as appropriate  - Encourage maximum independence but intervene and supervise when necessary  - Involve family in performance of ADLs  - Assess for home care needs following discharge   - Consider OT consult to assist with ADL evaluation and planning for discharge  - Provide patient education as appropriate  Outcome: Progressing  Goal: Maintains/Returns to pre admission functional level  Description: INTERVENTIONS:  - Perform BMAT or MOVE assessment daily    - Set and communicate daily mobility goal to care team and patient/family/caregiver  - Collaborate with rehabilitation services on mobility goals if consulted  - Perform Range of Motion 3 times a day  - Reposition patient every 2 hours    - Dangle patient 3 times a day  - Stand patient 3 times a day  - Ambulate patient 3 times a day  - Out of bed to chair 3 times a day   - Out of bed for meals 3  Problem: DISCHARGE PLANNING  Goal: Discharge to home or other facility with appropriate resources  Description: INTERVENTIONS:  - Identify barriers to discharge w/patient and caregiver  - Arrange for needed discharge resources and transportation as appropriate  - Identify discharge learning needs (meds, wound care, etc )  - Arrange for interpretive services to assist at discharge as needed  - Refer to Case Management Department for coordinating discharge planning if the patient needs post-hospital services based on physician/advanced practitioner order or complex needs related to functional status, cognitive ability, or social support system  Outcome: Progressing    times a day  - Out of bed for toileting  - Record patient progress and toleration of activity level   Outcome: Progressing     - Apply yellow socks and bracelet for high fall risk patients  - Consider moving patient to room near nurses station  Outcome: Progressing

## 2022-11-09 NOTE — DISCHARGE SUMMARY
[address] [facility]  Addendum to Discharge Summary Completed on 11/8/22- Keith Whiltey 1945, 77 y o  male MRN: [id number]  [facility] Encounter: [id number]  Primary Care Provider: Oscar Rondon MD   Date and time admitted to hospital: 11/2/2022  8:59 PM    * Periprosthetic fracture of knee  Assessment & Plan  - s/p ORIF R knee ORIF:   - Non weight bearing  right lower extremity   - clear for discharge, will follow up in 2 weeks with Ortho  - APS recommendations:   - Right-sided single shot femoral nerve block with local anesthetic is functioning appropriately, patient does report some mild sensory deficit the medial aspect of his right knee as well as some mild motor deficit which has been resolving  Pain continues to be well controlled  - Tylenol 975 mg every 8 hours scheduled   - Robaxin 500 mg every 8 hours, for muscle spasms   - Oxycodone 2 5 mg every 4 hours as needed, for moderate pain   - Oxycodone 5 mg every 4 hours as needed for severe pain    - Colace 100 mg twice daily  - PT recommended Acute Rehab  - medically appropriate for discharge  Will discharge to rehab today  Discharge was cancelled on 11/8/22 due to transport being cancelled  No new acute events occurred overnight  Medical Problems             Resolved Problems  Date Reviewed: 11/9/2022   None                 Admission Date:   Admission Orders (From admission, onward)     Ordered        11/02/22 2201  Inpatient Admission  Once                        Admitting Diagnosis: Unspecified multiple injuries, initial encounter [T07  XXXA]  Periprosthetic fracture of knee [id number]  [id number]]    HPI: Please refer to details completed in D/C summary from 11/8/22    Procedures Performed: No orders of the defined types were placed in this encounter  11/3/22 ORIF R femur fracture by orthopedics      Summary of Hospital Course:  Please refer to details completed in D/C summary from 11/8/22   Patient remained in the hospital overnight on 11/8 due to transport being cancelled  He had no acute events overnight and remains medically stable for discharge today  Subjective: Today patient had no complaints  Feeling well, ready to go home  Denies SOB, CP, N/V, abdominal pain  R leg pain under control  Exam:  Vitals:    11/09/22 0806   BP: 117/74   Pulse:    Resp: 16   Temp: 97 7 °F (36 5 °C)   SpO2:      GEN: NAD   HEENT: PERRL  NEURO: A&Ox3  CV: RRR  PULM: CTA  GI: No TTP  MSK: R leg incision c/d/i  SKIN: Warm, well perfused      Significant Findings, Care, Treatment and Services Provided:   XR Trauma chest portable    Result Date: 11/2/2022  Impression: No acute cardiopulmonary disease  Workstation performed: [id number]     XR femur 2 views RIGHT    Result Date: 11/2/2022  Impression: Acute comminuted distal femoral periprosthetic fracture  No acute osseous abnormality in the proximal femur, tibia-fibula, or ankle  Workstation performed: [id number]     XR knee 1 or 2 vw right    Result Date: 11/3/2022  Impression: Fluoroscopic guidance provided for procedure guidance  Please refer to the separate procedure notes for additional details  Workstation performed: [id number]     XR knee 1 or 2 vw right    Result Date: 11/3/2022  Impression: Postoperative right knee as noted Workstation performed: [id number]     XR knee 4+ vw right injury    Result Date: 11/2/2022  Impression: Acute comminuted distal femoral periprosthetic fracture  No acute osseous abnormality in the proximal femur, tibia-fibula, or ankle  Workstation performed: [id number]     XR tibia fibula 2 views RIGHT    Result Date: 11/2/2022  Impression: Acute comminuted distal femoral periprosthetic fracture  No acute osseous abnormality in the proximal femur, tibia-fibula, or ankle  Workstation performed: [id number]     XR ankle 3+ views RIGHT    Result Date: 11/2/2022  Impression: Acute comminuted distal femoral periprosthetic fracture   No acute osseous abnormality in the proximal femur, tibia-fibula, or ankle  Workstation performed: [id number]     TRAUMA - CT head wo contrast    Result Date: 11/2/2022  Impression: No acute intracranial abnormality  The study was marked in [facility] for immediate notification  Workstation performed: [id number]     TRAUMA - CT spine cervical wo contrast    Result Date: 11/2/2022  Impression: No cervical spine fracture or traumatic malalignment  The study was marked in [facility] for immediate notification  Workstation performed: [id number]     XR Trauma pelvis ap only 1 or 2 vw    Result Date: 11/2/2022  Impression: No acute osseous abnormality  Degenerative changes as described  Workstation performed: [id number]     TRAUMA - CT chest abdomen pelvis w contrast    Result Date: 11/2/2022  Impression: No acute traumatic injury in the chest, abdomen, or pelvis  The study was marked in [facility] for immediate notification  Workstation performed: [id number]     CT lower extremity wo contrast right    Result Date: 11/2/2022  Impression: Acute comminuted distal femoral periprosthetic fracture, as described  Workstation performed: [id number]       Complications: none    Condition at Discharge: good         Discharge instructions/Information to patient and family:   See after visit summary for information provided to patient and family  Provisions for Follow-Up Care:  See after visit summary for information related to follow-up care and any pertinent home health orders  PCP: Melissa Ngo MD    Disposition: Short-term rehab at [facility]    Planned Readmission: No    Discharge Statement   I spent 25 minutes discharging the patient  This time was spent on the day of discharge  I had direct contact with the patient on the day of discharge  Additional documentation is required if more than 30 minutes were spent on discharge  Discharge Medications:  See after visit summary for reconciled discharge medications provided to patient and family

## 2022-11-09 NOTE — ASSESSMENT & PLAN NOTE
- s/p ORIF R knee ORIF:   - Non weight bearing right lower extremity   - clear for discharge, will follow up in 2 weeks with Ortho  - APS recommendations:   - Right-sided single shot femoral nerve block with local anesthetic is functioning appropriately, patient does report some mild sensory deficit the medial aspect of his right knee as well as some mild motor deficit which has been resolving  Pain continues to be well controlled  - Tylenol 975 mg every 8 hours scheduled   - Robaxin 500 mg every 8 hours, for muscle spasms   - Oxycodone 2 5 mg every 4 hours as needed, for moderate pain   - Oxycodone 5 mg every 4 hours as needed for severe pain    - Colace 100 mg twice daily  - PT recommended Acute Rehab  - medically appropriate for discharge  Will discharge to rehab today  Discharge was cancelled on 11/8/22 due to transport being cancelled  No new acute events occurred overnight

## 2022-11-10 ENCOUNTER — PATIENT OUTREACH (OUTPATIENT)
Dept: CASE MANAGEMENT | Facility: OTHER | Age: 77
End: 2022-11-10

## 2022-11-10 NOTE — PROGRESS NOTES
Patient identified as New Medicare Bundle through report  Email with bundle communication tool sent to facility with bundle dates, DRG, and LOS  This care manager assistant will continue to monitor via chart review throughout bundle episode

## 2022-11-17 ENCOUNTER — PATIENT OUTREACH (OUTPATIENT)
Dept: CASE MANAGEMENT | Facility: OTHER | Age: 77
End: 2022-11-17

## 2022-11-18 ENCOUNTER — TELEPHONE (OUTPATIENT)
Dept: OBGYN CLINIC | Facility: HOSPITAL | Age: 77
End: 2022-11-18

## 2022-11-18 NOTE — TELEPHONE ENCOUNTER
Caller: Frankie Christina     Doctor: Micah Power    Reason for call: Patients wife called patient is in a rehab in [place]  She is wondering if they can take out his staples and when the staples need to come out? Is She is wondering if it is 2 weeks from surgery or discharge  The dr that is at the rehab is just a general dr   She is wondering what the next step is after the staples are out? When does he need to see you  Patient is still having difficulty getting up from a wheel chair  They said he he going to go home on Tuesday  He is not having any therapy after Monday  She thought he would have more therapy  Please advise       Call back#: [phone]

## 2022-11-18 NOTE — TELEPHONE ENCOUNTER
Spoke with Joe and they will have Dr Janora Lesch staples and apply steristrips  Spoke with wife and information above, she is concerned patient is being discharged too soon  Advised that she should meet with case mgt and voice her concerns and wishes to see if appeal could be made  If not VNA PT can be completed in the home  She will check with case mgt  Julieth Art,  Please advise if the following patient can be forced onto the schedule:    Patient: Ander Cabello    : [birth year]    MRN: [id number]    Call back #: [phone]     Insurance: Medicare     Reason for appointment: PO 8 weeks from sx 11/3 R knee ORIF    Requested doctor/location: [facility]      Thank you

## 2022-11-23 ENCOUNTER — TELEPHONE (OUTPATIENT)
Dept: OBGYN CLINIC | Facility: HOSPITAL | Age: 77
End: 2022-11-23

## 2022-11-23 ENCOUNTER — PATIENT OUTREACH (OUTPATIENT)
Dept: CASE MANAGEMENT | Facility: OTHER | Age: 77
End: 2022-11-23

## 2022-11-26 ENCOUNTER — TELEPHONE (OUTPATIENT)
Dept: OBGYN CLINIC | Facility: HOSPITAL | Age: 77
End: 2022-11-26

## 2022-11-26 NOTE — TELEPHONE ENCOUNTER
Caller: Byron Wong Nurse  Doctor: Suzie Alexis  MRN# [id number]  : 1945  Reason for call: Visiting nurse needs a Dr to call back and say it is ok for the patient to take the Lovenox 40 mg -   There was confusion on the medication the patient should take    Call back#: [phone]  Sent message to on call Dr Dr Nery Escamilla

## 2022-11-28 ENCOUNTER — TELEPHONE (OUTPATIENT)
Dept: OBGYN CLINIC | Facility: HOSPITAL | Age: 77
End: 2022-11-28

## 2022-11-29 ENCOUNTER — HOSPITAL ENCOUNTER (OUTPATIENT)
Dept: RADIOLOGY | Facility: HOSPITAL | Age: 77
Discharge: HOME | End: 2022-11-29
Attending: ORTHOPAEDIC SURGERY

## 2022-11-29 ENCOUNTER — OFFICE VISIT (OUTPATIENT)
Dept: OBGYN CLINIC | Facility: HOSPITAL | Age: 77
End: 2022-11-29

## 2022-11-29 VITALS
HEIGHT: 73 IN | HEART RATE: 86 BPM | SYSTOLIC BLOOD PRESSURE: 131 MMHG | BODY MASS INDEX: 37.99 KG/M2 | DIASTOLIC BLOOD PRESSURE: 74 MMHG

## 2022-11-29 DIAGNOSIS — Z48.89 AFTERCARE FOLLOWING SURGERY: ICD-10-CM

## 2022-11-29 DIAGNOSIS — Z48.89 AFTERCARE FOLLOWING SURGERY: Primary | ICD-10-CM

## 2022-11-29 DIAGNOSIS — M97.9XXA PERIPROSTHETIC FRACTURE OF KNEE: ICD-10-CM

## 2022-11-29 RX ORDER — ASPIRIN 325 MG
325 TABLET ORAL
COMMUNITY

## 2022-11-29 RX ORDER — FLUTICASONE PROPIONATE 50 MCG
1 SPRAY, SUSPENSION (ML) NASAL
COMMUNITY

## 2022-11-29 RX ORDER — CARBOXYMETHYLCELLULOSE SODIUM 5 MG/ML
1 SOLUTION/ DROPS OPHTHALMIC
COMMUNITY

## 2022-11-29 RX ORDER — ROPINIROLE 1 MG/1
TABLET, FILM COATED ORAL
COMMUNITY
Start: 2022-11-16

## 2022-11-29 RX ORDER — ASPIRIN 81 MG/1
81 TABLET ORAL DAILY
COMMUNITY

## 2022-11-29 RX ORDER — APIXABAN 2.5 MG/1
TABLET, FILM COATED ORAL
COMMUNITY
Start: 2022-11-23

## 2022-11-29 RX ORDER — ACETAMINOPHEN 325 MG/1
650 TABLET ORAL
COMMUNITY

## 2022-11-29 RX ORDER — HYDROCODONE BITARTRATE AND ACETAMINOPHEN 7.5; 325 MG/1; MG/1
TABLET ORAL
COMMUNITY

## 2022-11-29 NOTE — PROGRESS NOTES
Subjective;    78-year-old male who presents for his 1st postoperative hospital visit  He underwent successful ORIF of a periprosthetic fracture of the right distal femur on 3 November 2022  He went to rehabilitation nonweightbearing right lower extremity  He is now home and continues right nonweightbearing behavior  At rehab he had his incisional staples removed however retention nylon sutures, remain-appropriately so  He presents the office today for follow-up visit x-rays of the right distal femur and removal of the retention sutures if appropriate  He is accompanied by his wife    History reviewed  No pertinent past medical history  Past Surgical History:   Procedure Laterality Date   • ORIF PERIPROSTHETIC KNEE Right 11/3/2022    Procedure: OPEN REDUCTION W/ INTERNAL FIXATION (ORIF) PERIPROSTHETIC KNEE;  Surgeon: Delvis Kimble MD;  [facility]: [facility];  Service: Orthopedics       History reviewed  No pertinent family history  Social History     Tobacco Use   • Smoking status: Former   • Smokeless tobacco: Former   Substance Use Topics   • Alcohol use: Not Currently     Exam;    Adult male seated in a wheelchair  He has no pain with the right lower extremity at rest  He allows for the examiner to extend the knee  Interrupted nylon retention sutures remain have no redness no drainage and a healed incision  He has flexion of the knee to approximately  80°  He has no posterior calf pain right lower extremity    X-rays; right distal femur, two views, show evidence of total knee components, periprosthetic fracture of the distal femur, and a distal femoral fixation plate, without change since its insertion  Impression;    Right distal femoral periprosthetic fracture  Known history of right total knee components    Plan;    Absolute nonweightbearing behavior right lower extremity  Nylon interrupted sutures were removed at this time    He will continue rehabilitative therapy in his home  PT should be aimed at right ankle range of motion knee range of motion, and the avoidance of calf swelling or discomfort  His next follow-up should be 3 additional week, x-rays right distal femur on large plate AP and lateral    His entire experience was supervised by and plan formulated by the attending surgeon it was my privilege to assist him in the delivery of this gentleman's care

## 2022-12-01 ENCOUNTER — PATIENT OUTREACH (OUTPATIENT)
Dept: CASE MANAGEMENT | Facility: OTHER | Age: 77
End: 2022-12-01

## 2022-12-01 NOTE — PROGRESS NOTES
Upon chart review the patient was discharged from [facility] to home  A email was sent to the facility requesting discharge instructions  When CM assistant has received the Discharge paperwork CM assistant will attach to this episode  I have removed myself off of the care team, added the CM to the care team who will follow the patient through the bundle episode, sent the care manager a inbasket notifying them of the bundle episode, updated the BPCI form, and updated the care coordination note

## 2022-12-01 NOTE — PROGRESS NOTES
Outreach TC to patient for CM assessment  No answer to call  Left message on voicemail requesting a return call from patient to Hodan Perez 12, BSN; Outpatient Care Manager @ [phone]  First unanswered call to patient  Chart review reveals that patient had a mechanical fall on 11/2/22 and fractured the distal end of his R femur  Patient has a hx of R TKA  Patient had surgical repair on 11/3/22 and has been non-weight since 11/3/22  Patient did see orthopedics on 11/29/22  Patient remains NWB  Outreach will continue to try and complete an assessment

## 2022-12-02 ENCOUNTER — PATIENT OUTREACH (OUTPATIENT)
Dept: CASE MANAGEMENT | Facility: OTHER | Age: 77
End: 2022-12-02

## 2022-12-02 NOTE — PROGRESS NOTES
Outreach TC to patient for CM assessment  No answer to call  Left message on voicemail requesting a return call from patient to Hodan Perez 12, BSN; [address]  [employer] [address] @ [phone]  Second unanswered call to patient  Chart review reveals that patient has an upcoming appointment with orthopedics on 12/20/22  Outreach will continue to attempt to reach patient for a full assessment

## 2022-12-07 ENCOUNTER — PATIENT OUTREACH (OUTPATIENT)
Dept: CASE MANAGEMENT | Facility: OTHER | Age: 77
End: 2022-12-07

## 2022-12-07 NOTE — PROGRESS NOTES
Outreach TC to patient for CM assessment  No answer to call  Mailbox is full , so unable to leave a message on voicemail requesting a return call from patient to Hodan Perez 12, BSN; [address] [email or web address]  [address] [facility] [address] @ [phone]  Third unanswered call to patient  Chart review reveals that patient has not completed any recent office visits, labs or imaging to review in [facility]  Care Everywhere was queried but no new information was noted  Chart review will continue until end of episode

## 2022-12-12 DIAGNOSIS — Z48.89 AFTERCARE FOLLOWING SURGERY: Primary | ICD-10-CM

## 2022-12-20 ENCOUNTER — HOSPITAL ENCOUNTER (OUTPATIENT)
Dept: RADIOLOGY | Facility: HOSPITAL | Age: 77
Discharge: HOME | End: 2022-12-20
Attending: ORTHOPAEDIC SURGERY

## 2022-12-20 ENCOUNTER — OFFICE VISIT (OUTPATIENT)
Dept: OBGYN CLINIC | Facility: HOSPITAL | Age: 77
End: 2022-12-20

## 2022-12-20 VITALS
HEIGHT: 73 IN | SYSTOLIC BLOOD PRESSURE: 124 MMHG | DIASTOLIC BLOOD PRESSURE: 72 MMHG | HEART RATE: 92 BPM | BODY MASS INDEX: 37.99 KG/M2

## 2022-12-20 DIAGNOSIS — M97.11XS PERIPROSTHETIC FRACTURE AROUND INTERNAL PROSTHETIC RIGHT KNEE JOINT, SEQUELA: Primary | ICD-10-CM

## 2022-12-20 DIAGNOSIS — Z98.890 S/P ORIF (OPEN REDUCTION INTERNAL FIXATION) FRACTURE: ICD-10-CM

## 2022-12-20 DIAGNOSIS — Z48.89 AFTERCARE FOLLOWING SURGERY: ICD-10-CM

## 2022-12-20 DIAGNOSIS — Z87.81 S/P ORIF (OPEN REDUCTION INTERNAL FIXATION) FRACTURE: ICD-10-CM

## 2022-12-20 RX ORDER — TAMSULOSIN HYDROCHLORIDE 0.4 MG/1
CAPSULE ORAL
COMMUNITY

## 2022-12-20 RX ORDER — FERROUS SULFATE 325(65) MG
TABLET ORAL
COMMUNITY

## 2022-12-20 NOTE — PROGRESS NOTES
Assessment:   Diagnosis ICD-10-CM Associated Orders   1  Periprosthetic fracture around internal prosthetic right knee joint, sequela  M97  11XS Ambulatory Referral to Physical Therapy      2  S/P ORIF (open reduction internal fixation) fracture  Z98 890     Z87 81           Plan:  • X-rays taken and reviewed, physical exam performed  • Recommendation is to remain nonweightbearing on his right lower extremity for 4 additional weeks  • May continue with physical as well as home therapy exercises for range of motion and isometric quadricep strengthening exercises  To do next visit:  Return in about 4 weeks (around 1/17/2023) for re-check with x-rays right knee (distal thigh)   The above stated was discussed in layman's terms and the patient expressed understanding  All questions were answered to the patient's satisfaction  Scribe Attestation    I,:  Ranjith Moraes am acting as a scribe while in the presence of the attending physician :       I,:  Corine Ramirez MD personally performed the services described in this documentation    as scribed in my presence :             Subjective:   Tony Lei is a 69 y o  male who presents with his wife second postoperative visit 6 weeks status post ORIF right periprosthetic distal femur fracture  Overall he is doing well  He has been receiving home therapy and has been compliant with his nonweightbearing status  He denies any calf or thigh pain  Denies any fevers or chills  Review of systems negative unless otherwise specified in HPI  Review of Systems    History reviewed  No pertinent past medical history  Past Surgical History:   Procedure Laterality Date   • ORIF PERIPROSTHETIC KNEE Right 11/3/2022    Procedure: OPEN REDUCTION W/ INTERNAL FIXATION (ORIF) PERIPROSTHETIC KNEE;  Surgeon: Corine Ramirez MD;  Location: [facility];  Service: Orthopedics       History reviewed  No pertinent family history      Social History     Occupational History   • Not on file   Tobacco Use   • Smoking status: Former   • Smokeless tobacco: Former   Substance and Sexual Activity   • Alcohol use: Not Currently   • Drug use: Not on file   • Sexual activity: Not on file         Current Outpatient Medications:   •  acetaminophen (TYLENOL) 325 mg tablet, Take 650 mg by mouth, Disp: , Rfl:   •  aspirin (ECOTRIN LOW STRENGTH) 81 mg EC tablet, Take 81 mg by mouth daily, Disp: , Rfl:   •  aspirin 325 mg tablet, Take 325 mg by mouth, Disp: , Rfl:   •  aspirin 81 mg chewable tablet, Chew 81 mg daily, Disp: , Rfl:   •  atorvastatin (LIPITOR) 40 mg tablet, Take 40 mg by mouth daily, Disp: , Rfl:   •  carboxymethylcellulose (REFRESH PLUS) 0 5 % SOLN, 1 drop, Disp: , Rfl:   •  Eliquis 2 5 MG, , Disp: , Rfl:   •  enoxaparin (LOVENOX) 40 mg/0 4 mL, Inject 0 4 mL (40 mg total) under the skin daily in the early morning for 28 days, Disp: 11 2 mL, Rfl: 0  •  ezetimibe (ZETIA) 10 mg tablet, Take 10 mg by mouth daily, Disp: , Rfl:   •  ferrous sulfate 325 (65 Fe) mg tablet, Take by mouth, Disp: , Rfl:   •  fluticasone (FLONASE) 50 mcg/act nasal spray, 2 sprays into each nostril daily, Disp: , Rfl:   •  fluticasone (FLONASE) 50 mcg/act nasal spray, 1 spray, Disp: , Rfl:   •  HYDROcodone-acetaminophen (NORCO) 7 5-325 mg per tablet, Take by mouth, Disp: , Rfl:   •  hydrocortisone 1 % cream, Apply topically 4 (four) times a day as needed for rash, Disp: 30 g, Rfl: 0  •  metoprolol succinate (TOPROL-XL) 25 mg 24 hr tablet, Take 25 mg by mouth daily at bedtime, Disp: , Rfl:   •  Multiple Vitamin (multivitamin) tablet, Take 1 tablet by mouth daily, Disp: , Rfl:   •  oxyCODONE (ROXICODONE) 5 immediate release tablet, 2 5 mg to 5 mg PO every 4 hours as needed for moderate to severe pain  Ongoing therapy  , Disp: 20 tablet, Rfl: 0  •  polyvinyl alcohol (LIQUIFILM TEARS) 1 4 % ophthalmic solution, Administer 1 drop to both eyes as needed for dry eyes, Disp: , Rfl:   •  Polyvinyl Alcohol-Povidone PF 1 4-0 6 % SOLN, once daily, Disp: , Rfl:   •  rOPINIRole (REQUIP) 1 mg tablet, , Disp: , Rfl:   •  tamsulosin (FLOMAX) 0 4 mg, 0 4 mg by oral route , Disp: , Rfl:     No Known Allergies         Vitals:    12/20/22 1308   BP: 124/72   Pulse: 92       Objective:                    Right Knee Exam     Range of Motion   Extension: 15   Flexion: 90     Other   Erythema: absent  Sensation: normal  Swelling: moderate    Comments:    Healed incision anteriorly  Minimal warmth  No signs of infection  Intact extensor mechanism  Calf and thigh are soft and nontender no signs of DVT            Diagnostics, reviewed and taken today if performed as documented: The attending physician has personally reviewed the pertinent films in PACS and interpretation is as follows:    Right knee x-rays taken and reviewed in the office today show: Total knee prosthesis is well as plate and multiple screws in unchanged alignment  Otherwise hardware is in acceptable position  Very minimal callus formation present at his distal femur fracture site  Procedures, if performed today:    Procedures    None performed      Portions of the record may have been created with voice recognition software  Occasional wrong word or "sound a like" substitutions may have occurred due to the inherent limitations of voice recognition software  Read the chart carefully and recognize, using context, where substitutions have occurred

## 2022-12-20 NOTE — PATIENT INSTRUCTIONS
Diagnosis ICD-10-CM Associated Orders   1  Periprosthetic fracture around internal prosthetic right knee joint, sequela  M97  11XS Ambulatory Referral to Physical Therapy      2  S/P ORIF (open reduction internal fixation) fracture  Z98 890     Z87 81         Remain non-weight bearing on the right lower extremity  May perform ROM and isometric strengthening exercises  Return in about 4 weeks (around 1/17/2023) for re-check with x-rays right knee (distal thigh)

## 2023-01-04 ENCOUNTER — PATIENT OUTREACH (OUTPATIENT)
Dept: CASE MANAGEMENT | Facility: OTHER | Age: 78
End: 2023-01-04

## 2023-01-05 NOTE — PROGRESS NOTES
Chart review reveals that patient did complete an office visit with orthopedics on 12/20/22  X-rays were completed and patient was advised to maintain NWB to RLE x 4 additional weeks  Patient will return to the office on 1/18/23 for additional x-rays and physical exam  According to office notes, patient understood all information and weight bearing status  Chart review will continue until end of episode

## 2023-01-10 DIAGNOSIS — Z48.89 AFTERCARE FOLLOWING SURGERY: Primary | ICD-10-CM

## 2023-01-18 ENCOUNTER — OFFICE VISIT (OUTPATIENT)
Dept: OBGYN CLINIC | Facility: HOSPITAL | Age: 78
End: 2023-01-18

## 2023-01-18 ENCOUNTER — HOSPITAL ENCOUNTER (OUTPATIENT)
Dept: RADIOLOGY | Facility: HOSPITAL | Age: 78
Discharge: HOME | End: 2023-01-18
Attending: ORTHOPAEDIC SURGERY

## 2023-01-18 VITALS
BODY MASS INDEX: 37.99 KG/M2 | DIASTOLIC BLOOD PRESSURE: 67 MMHG | HEIGHT: 73 IN | SYSTOLIC BLOOD PRESSURE: 125 MMHG | HEART RATE: 91 BPM

## 2023-01-18 DIAGNOSIS — Z48.89 AFTERCARE FOLLOWING SURGERY: ICD-10-CM

## 2023-01-18 DIAGNOSIS — Z48.89 AFTERCARE FOLLOWING SURGERY: Primary | ICD-10-CM

## 2023-01-18 NOTE — PROGRESS NOTES
Assessment:   Diagnosis ICD-10-CM Associated Orders   1  Aftercare following surgery  Z48 [id number] Ambulatory Referral to Physical Therapy          Plan:  • X-rays taken and reviewed, physical exam performed  We discussed the results  • Recommendation is to be weightbearing 1/2 his body weight for next 6 weeks  • Pt to use walker for ambulation  • Updated script for Physical Therapy     To do next visit:  Return in about 6 weeks (around 3/1/2023) for recheck with xrays  The above stated was discussed in layman's terms and the patient expressed understanding  All questions were answered to the patient's satisfaction  Scribe Attestation    I,:  Janessa Nice am acting as a scribe while in the presence of the attending physician :       I,:  Purvi Devries MD personally performed the services described in this documentation    as scribed in my presence :             Subjective:   Faustina Garg is a 69 y o  male who presents with his wife second postoperative visit 11 weeks status post ORIF right periprosthetic distal femur fracture  Overall he is doing well  He has been receiving home therapy and has been compliant with his nonweightbearing status  He reports lateral thigh pain and some medial patella pain  Denies any fevers or chills  Review of systems negative unless otherwise specified in HPI  Review of Systems   Constitutional: Negative for chills and fever  HENT: Negative for ear pain and sore throat  Eyes: Negative for pain and visual disturbance  Respiratory: Negative for cough and shortness of breath  Cardiovascular: Negative for chest pain and palpitations  Gastrointestinal: Negative for abdominal pain and vomiting  Genitourinary: Negative for dysuria and hematuria  Musculoskeletal: Positive for arthralgias  Negative for back pain  Skin: Negative for color change and rash  Neurological: Negative for seizures and syncope     All other systems reviewed and are negative  No past medical history on file  Past Surgical History:   Procedure Laterality Date   • ORIF PERIPROSTHETIC KNEE Right 11/3/2022    Procedure: OPEN REDUCTION W/ INTERNAL FIXATION (ORIF) PERIPROSTHETIC KNEE;  Surgeon: Purvi Devries MD;  Location: [facility];  Service: Orthopedics       No family history on file      Social History     Occupational History   • Not on file   Tobacco Use   • Smoking status: Former   • Smokeless tobacco: Former   Substance and Sexual Activity   • Alcohol use: Not Currently   • Drug use: Not on file   • Sexual activity: Not on file         Current Outpatient Medications:   •  acetaminophen (TYLENOL) 325 mg tablet, Take 650 mg by mouth, Disp: , Rfl:   •  aspirin (ECOTRIN LOW STRENGTH) 81 mg EC tablet, Take 81 mg by mouth daily, Disp: , Rfl:   •  aspirin 325 mg tablet, Take 325 mg by mouth, Disp: , Rfl:   •  aspirin 81 mg chewable tablet, Chew 81 mg daily, Disp: , Rfl:   •  atorvastatin (LIPITOR) 40 mg tablet, Take 40 mg by mouth daily, Disp: , Rfl:   •  carboxymethylcellulose (REFRESH PLUS) 0 5 % SOLN, 1 drop, Disp: , Rfl:   •  Eliquis 2 5 MG, , Disp: , Rfl:   •  enoxaparin (LOVENOX) 40 mg/0 4 mL, Inject 0 4 mL (40 mg total) under the skin daily in the early morning for 28 days, Disp: 11 2 mL, Rfl: 0  •  ezetimibe (ZETIA) 10 mg tablet, Take 10 mg by mouth daily, Disp: , Rfl:   •  ferrous sulfate 325 (65 Fe) mg tablet, Take by mouth, Disp: , Rfl:   •  fluticasone (FLONASE) 50 mcg/act nasal spray, 2 sprays into each nostril daily, Disp: , Rfl:   •  fluticasone (FLONASE) 50 mcg/act nasal spray, 1 spray, Disp: , Rfl:   •  HYDROcodone-acetaminophen (NORCO) 7 5-325 mg per tablet, Take by mouth, Disp: , Rfl:   •  hydrocortisone 1 % cream, Apply topically 4 (four) times a day as needed for rash, Disp: 30 g, Rfl: 0  •  metoprolol succinate (TOPROL-XL) 25 mg 24 hr tablet, Take 25 mg by mouth daily at bedtime, Disp: , Rfl:   •  Multiple Vitamin (multivitamin) tablet, Take 1 tablet by mouth daily, Disp: , Rfl:   •  oxyCODONE (ROXICODONE) 5 immediate release tablet, 2 5 mg to 5 mg PO every 4 hours as needed for moderate to severe pain  Ongoing therapy  , Disp: 20 tablet, Rfl: 0  •  polyvinyl alcohol (LIQUIFILM TEARS) 1 4 % ophthalmic solution, Administer 1 drop to both eyes as needed for dry eyes, Disp: , Rfl:   •  Polyvinyl Alcohol-Povidone PF 1 4-0 6 % SOLN, once daily, Disp: , Rfl:   •  rOPINIRole (REQUIP) 1 mg tablet, , Disp: , Rfl:   •  tamsulosin (FLOMAX) 0 4 mg, 0 4 mg by oral route , Disp: , Rfl:     No Known Allergies         Vitals:    01/18/23 1445   BP: 125/67   Pulse: 91       Objective:                    Right Knee Exam     Range of Motion   Extension: 15   Flexion: 90     Other   Erythema: absent  Scars: present  Sensation: normal  Swelling: moderate    Comments:    Healed incision anteriorly  No warmth  No signs of infection  Intact extensor mechanism  Calf and thigh are soft and nontender no signs of DVT            Diagnostics, reviewed and taken today if performed as documented: The attending physician has personally reviewed the pertinent films in PACS and interpretation is as follows:    Right knee x-rays taken and reviewed in the office today show:   Callus bone formation  Stable hardware with no signs of failure      Procedures, if performed today:    Procedures    None performed      Portions of the record may have been created with voice recognition software  Occasional wrong word or "sound a like" substitutions may have occurred due to the inherent limitations of voice recognition software  Read the chart carefully and recognize, using context, where substitutions have occurred

## 2023-02-07 ENCOUNTER — PATIENT OUTREACH (OUTPATIENT)
Dept: CASE MANAGEMENT | Facility: OTHER | Age: 78
End: 2023-02-07

## 2023-02-07 NOTE — PROGRESS NOTES
Chart review reveals that patient did complete an office visit with orthopedics on 1/18/23  Patient is allowed half weightbearing on his RLE  Patient is to ambulate with a walker  For the next 6 weeks  Patient is 11 weeks s/p ORIF of the R distal periprosthetic femur fracture  Patient has been nonweightbearing  Patient will return to the office on 3/2/23  BPCI episode end  The episode has been resolved  I removed myself from the care team and the care coordination note has been updated

## 2023-02-20 DIAGNOSIS — Z48.89 AFTERCARE FOLLOWING SURGERY: Primary | ICD-10-CM

## 2023-03-02 ENCOUNTER — HOSPITAL ENCOUNTER (OUTPATIENT)
Dept: RADIOLOGY | Facility: HOSPITAL | Age: 78
Discharge: HOME | End: 2023-03-02
Attending: ORTHOPAEDIC SURGERY

## 2023-03-02 ENCOUNTER — OFFICE VISIT (OUTPATIENT)
Dept: OBGYN CLINIC | Facility: HOSPITAL | Age: 78
End: 2023-03-02

## 2023-03-02 VITALS
HEIGHT: 73 IN | DIASTOLIC BLOOD PRESSURE: 78 MMHG | HEART RATE: 83 BPM | BODY MASS INDEX: 37.99 KG/M2 | SYSTOLIC BLOOD PRESSURE: 122 MMHG

## 2023-03-02 DIAGNOSIS — Z48.89 AFTERCARE FOLLOWING SURGERY: ICD-10-CM

## 2023-03-02 DIAGNOSIS — M97.11XS PERIPROSTHETIC FRACTURE AROUND INTERNAL PROSTHETIC RIGHT KNEE JOINT, SEQUELA: ICD-10-CM

## 2023-03-02 DIAGNOSIS — Z87.81 S/P ORIF (OPEN REDUCTION INTERNAL FIXATION) FRACTURE: ICD-10-CM

## 2023-03-02 DIAGNOSIS — Z98.890 S/P ORIF (OPEN REDUCTION INTERNAL FIXATION) FRACTURE: ICD-10-CM

## 2023-03-02 DIAGNOSIS — Z48.89 AFTERCARE FOLLOWING SURGERY: Primary | ICD-10-CM

## 2023-03-02 NOTE — PROGRESS NOTES
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, sequela  Ambulatory referral to Physical Therapy      3  S/P ORIF (open reduction internal fixation) fracture  Ambulatory referral to Physical Therapy          Plan:  4 months s/p right knee periprosthetic ORIF, 11/3/2022  The patient is doing well  Radiograph displays increased callus formation over fracture site  The patient can progress to full weight bear as tolerated  He should continue physical therapy with focus on ROM and strength  He should follow up in 6 weeks  To do next visit:  Return in about 6 weeks (around 4/13/2023)  The above stated was discussed in layman's terms and the patient expressed understanding  All questions were answered to the patient's satisfaction  Scribe Attestation    I,:  Sharon Ruiz am acting as a scribe while in the presence of the attending physician :       I,:  Lloyd Councilman, MD personally performed the services described in this documentation    as scribed in my presence :             Subjective:   Gricel Wilson is a 69 y o  male who presents 4 months s/p right knee periprosthetic ORIF, 11/3/2022  He is doing well  Today he complains of mild right knee pain with greater complaints of stiffness  He has been partial weight bearing  He does participate in physical therapy with progress  He is in wheel chair in office  Review of systems negative unless otherwise specified in HPI    No past medical history on file  Past Surgical History:   Procedure Laterality Date   • ORIF PERIPROSTHETIC KNEE Right 11/3/2022    Procedure: OPEN REDUCTION W/ INTERNAL FIXATION (ORIF) PERIPROSTHETIC KNEE;  Surgeon: Lloyd Councilman, MD;  Location: [facility];  Service: Orthopedics       No family history on file      Social History     Occupational History   • Not on file   Tobacco Use   • Smoking Per Dr Whitehead patient is to monitor low back pain for now and follow up at next appt 01/13/2023   status: Former   • Smokeless tobacco: Former   Substance and Sexual Activity   • Alcohol use: Not Currently   • Drug use: Not on file   • Sexual activity: Not on file         Current Outpatient Medications:   •  acetaminophen (TYLENOL) 325 mg tablet, Take 650 mg by mouth, Disp: , Rfl:   •  aspirin (ECOTRIN LOW STRENGTH) 81 mg EC tablet, Take 81 mg by mouth daily, Disp: , Rfl:   •  aspirin 325 mg tablet, Take 325 mg by mouth, Disp: , Rfl:   •  aspirin 81 mg chewable tablet, Chew 81 mg daily, Disp: , Rfl:   •  atorvastatin (LIPITOR) 40 mg tablet, Take 40 mg by mouth daily, Disp: , Rfl:   •  carboxymethylcellulose (REFRESH PLUS) 0 5 % SOLN, 1 drop, Disp: , Rfl:   •  Eliquis 2 5 MG, , Disp: , Rfl:   •  enoxaparin (LOVENOX) 40 mg/0 4 mL, Inject 0 4 mL (40 mg total) under the skin daily in the early morning for 28 days, Disp: 11 2 mL, Rfl: 0  •  ezetimibe (ZETIA) 10 mg tablet, Take 10 mg by mouth daily, Disp: , Rfl:   •  ferrous sulfate 325 (65 Fe) mg tablet, Take by mouth, Disp: , Rfl:   •  fluticasone (FLONASE) 50 mcg/act nasal spray, 2 sprays into each nostril daily, Disp: , Rfl:   •  fluticasone (FLONASE) 50 mcg/act nasal spray, 1 spray, Disp: , Rfl:   •  HYDROcodone-acetaminophen (NORCO) 7 5-325 mg per tablet, Take by mouth, Disp: , Rfl:   •  hydrocortisone 1 % cream, Apply topically 4 (four) times a day as needed for rash, Disp: 30 g, Rfl: 0  •  metoprolol succinate (TOPROL-XL) 25 mg 24 hr tablet, Take 25 mg by mouth daily at bedtime, Disp: , Rfl:   •  Multiple Vitamin (multivitamin) tablet, Take 1 tablet by mouth daily, Disp: , Rfl:   •  oxyCODONE (ROXICODONE) 5 immediate release tablet, 2 5 mg to 5 mg PO every 4 hours as needed for moderate to severe pain  Ongoing therapy  , Disp: 20 tablet, Rfl: 0  •  polyvinyl alcohol (LIQUIFILM TEARS) 1 4 % ophthalmic solution, Administer 1 drop to both eyes as needed for dry eyes, Disp: , Rfl:   •  Polyvinyl Alcohol-Povidone PF 1 4-0 6 % SOLN, once daily, Disp: , Rfl:   • rOPINIRole (REQUIP) 1 mg tablet, , Disp: , Rfl:   •  tamsulosin (FLOMAX) 0 4 mg, 0 4 mg by oral route , Disp: , Rfl:     No Known Allergies         Vitals:    03/02/23 1527   BP: 122/78   Pulse: 83       Objective:  Physical exam  · General: Awake, Alert, Oriented  · Eyes: Pupils equal, round and reactive to light  · Heart: regular rate and rhythm  · Lungs: No audible wheezing  · Abdomen: soft                    Ortho Exam  Right knee:  Well healed anterior incisional scar  Patient able to extend knee  No erythema or ecchymosis  No effusion or swelling  Normal strength  Good ROM   Calf compartments soft and supple  Sensation intact  Toes are warm sensate and mobile        Diagnostics, reviewed and taken today if performed as documented: The attending physician has personally reviewed the pertinent films in PACS and interpretation is as follows:  Right knee x-ray:  Healing periprosthetic fracture s/p TKA  No hardware failure  Procedures, if performed today:    Procedures    None performed      Portions of the record may have been created with voice recognition software  Occasional wrong word or "sound a like" substitutions may have occurred due to the inherent limitations of voice recognition software  Read the chart carefully and recognize, using context, where substitutions have occurred

## 2023-03-30 DIAGNOSIS — Z09 FRACTURE FOLLOW-UP: Primary | ICD-10-CM

## 2023-05-15 DIAGNOSIS — Z48.89 AFTERCARE FOLLOWING SURGERY: Primary | ICD-10-CM

## 2023-05-25 ENCOUNTER — HOSPITAL ENCOUNTER (OUTPATIENT)
Dept: RADIOLOGY | Facility: HOSPITAL | Age: 78
Discharge: HOME | End: 2023-05-25
Attending: ORTHOPAEDIC SURGERY

## 2023-05-25 ENCOUNTER — OFFICE VISIT (OUTPATIENT)
Dept: OBGYN CLINIC | Facility: HOSPITAL | Age: 78
End: 2023-05-25

## 2023-05-25 VITALS
BODY MASS INDEX: 37.87 KG/M2 | HEIGHT: 73 IN | DIASTOLIC BLOOD PRESSURE: 77 MMHG | HEART RATE: 112 BPM | SYSTOLIC BLOOD PRESSURE: 145 MMHG

## 2023-05-25 DIAGNOSIS — Z87.81 S/P ORIF (OPEN REDUCTION INTERNAL FIXATION) FRACTURE: ICD-10-CM

## 2023-05-25 DIAGNOSIS — Z48.89 AFTERCARE FOLLOWING SURGERY: Primary | ICD-10-CM

## 2023-05-25 DIAGNOSIS — M97.11XS PERIPROSTHETIC FRACTURE AROUND INTERNAL PROSTHETIC RIGHT KNEE JOINT, SEQUELA: ICD-10-CM

## 2023-05-25 DIAGNOSIS — Z48.89 AFTERCARE FOLLOWING SURGERY: ICD-10-CM

## 2023-05-25 DIAGNOSIS — Z98.890 S/P ORIF (OPEN REDUCTION INTERNAL FIXATION) FRACTURE: ICD-10-CM

## 2023-05-25 RX ORDER — SULFAMETHOXAZOLE AND TRIMETHOPRIM 800; 160 MG/1; MG/1
TABLET ORAL
COMMUNITY
Start: 2023-04-10

## 2023-05-25 NOTE — PROGRESS NOTES
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, sequela  Ambulatory referral to Physical Therapy      3  S/P ORIF (open reduction internal fixation) fracture  Ambulatory referral to Physical Therapy          Plan:  6 months s/p right knee periprosthetic ORIF, 11/3/2022  He is doing well  He is encouraged to continue physical therapy  Follow up in 2 months  To do next visit:  Return in about 2 months (around 7/25/2023)  The above stated was discussed in layman's terms and the patient expressed understanding  All questions were answered to the patient's satisfaction  Scribe Attestation    I,:  Jada Aquino am acting as a scribe while in the presence of the attending physician :       I,:  Bernard Desouza MD personally performed the services described in this documentation    as scribed in my presence :             Subjective:   Tabitha Tran is a 69 y o  male who presents 6 months s/p right knee periprosthetic ORIF, 11/3/2022  He is progressing slowly  Today he complains of right anteromedial knee pain with right lateral thigh numbness  He continues physical therapy with benefit  Review of systems negative unless otherwise specified in HPI    History reviewed  No pertinent past medical history  Past Surgical History:   Procedure Laterality Date   • ORIF PERIPROSTHETIC KNEE Right 11/3/2022    Procedure: OPEN REDUCTION W/ INTERNAL FIXATION (ORIF) PERIPROSTHETIC KNEE;  Surgeon: Bernard Desouza MD;  Location: [facility];  Service: Orthopedics       History reviewed  No pertinent family history      Social History     Occupational History   • Not on file   Tobacco Use   • Smoking status: Former   • Smokeless tobacco: Former   Substance and Sexual Activity   • Alcohol use: Not Currently   • Drug use: Not on file   • Sexual activity: Not on file         Current Outpatient Medications:   •  acetaminophen (TYLENOL) 325 mg tablet, Take 650 mg by mouth, Disp: , Rfl:   •  aspirin (ECOTRIN LOW STRENGTH) 81 mg EC tablet, Take 81 mg by mouth daily, Disp: , Rfl:   •  aspirin 325 mg tablet, Take 325 mg by mouth (Patient not taking: Reported on 4/13/2023), Disp: , Rfl:   •  aspirin 81 mg chewable tablet, Chew 81 mg daily, Disp: , Rfl:   •  atorvastatin (LIPITOR) 40 mg tablet, Take 40 mg by mouth daily, Disp: , Rfl:   •  carboxymethylcellulose (REFRESH PLUS) 0 5 % SOLN, 1 drop, Disp: , Rfl:   •  Eliquis 2 5 MG, , Disp: , Rfl:   •  enoxaparin (LOVENOX) 40 mg/0 4 mL, Inject 0 4 mL (40 mg total) under the skin daily in the early morning for 28 days, Disp: 11 2 mL, Rfl: 0  •  ezetimibe (ZETIA) 10 mg tablet, Take 10 mg by mouth daily, Disp: , Rfl:   •  ferrous sulfate 325 (65 Fe) mg tablet, Take by mouth, Disp: , Rfl:   •  fluticasone (FLONASE) 50 mcg/act nasal spray, 2 sprays into each nostril daily, Disp: , Rfl:   •  fluticasone (FLONASE) 50 mcg/act nasal spray, 1 spray (Patient not taking: Reported on 4/13/2023), Disp: , Rfl:   •  HYDROcodone-acetaminophen (NORCO) 7 5-325 mg per tablet, Take by mouth (Patient not taking: Reported on 4/13/2023), Disp: , Rfl:   •  hydrocortisone 1 % cream, Apply topically 4 (four) times a day as needed for rash, Disp: 30 g, Rfl: 0  •  metoprolol succinate (TOPROL-XL) 25 mg 24 hr tablet, Take 25 mg by mouth daily at bedtime, Disp: , Rfl:   •  Multiple Vitamin (multivitamin) tablet, Take 1 tablet by mouth daily, Disp: , Rfl:   •  oxyCODONE (ROXICODONE) 5 immediate release tablet, 2 5 mg to 5 mg PO every 4 hours as needed for moderate to severe pain  Ongoing therapy   (Patient not taking: Reported on 4/13/2023), Disp: 20 tablet, Rfl: 0  •  polyvinyl alcohol (LIQUIFILM TEARS) 1 4 % ophthalmic solution, Administer 1 drop to both eyes as needed for dry eyes, Disp: , Rfl:   •  Polyvinyl Alcohol-Povidone PF 1 4-0 6 % SOLN, once daily, Disp: , Rfl:   •  rOPINIRole "(REQUIP) 1 mg tablet, , Disp: , Rfl:   •  sulfamethoxazole-trimethoprim (BACTRIM DS) 800-160 mg per tablet, , Disp: , Rfl:   •  tamsulosin (FLOMAX) 0 4 mg, 0 4 mg by oral route , Disp: , Rfl:     No Known Allergies         Vitals:    05/25/23 1559   BP: 145/77   Pulse: (!) 112       Objective:  Physical exam  · General: Awake, Alert, Oriented  · Eyes: Pupils equal, round and reactive to light  · Heart: regular rate and rhythm  · Lungs: No audible wheezing  · Abdomen: soft                    Ortho Exam  Right knee:  Well healed anterior incisional scar  Patient able to extend knee  No erythema or ecchymosis  No effusion or swelling  Normal strength  Good ROM   Calf compartments soft and supple  Sensation intact  Toes are warm sensate and mobile    Diagnostics, reviewed and taken today if performed as documented: The attending physician has personally reviewed the pertinent films in PACS and interpretation is as follows:  Right femur x-ray:  Healed periprosthetic fracture s/p TKA  No hardware failure  Procedures, if performed today:    Procedures    None performed      Portions of the record may have been created with voice recognition software  Occasional wrong word or \"sound a like\" substitutions may have occurred due to the inherent limitations of voice recognition software  Read the chart carefully and recognize, using context, where substitutions have occurred      "

## 2023-05-26 ENCOUNTER — TELEPHONE (OUTPATIENT)
Dept: OBGYN CLINIC | Facility: HOSPITAL | Age: 78
End: 2023-05-26

## 2023-05-26 NOTE — TELEPHONE ENCOUNTER
Caller: Yaniv Sands - Wife    Doctor: Chari Velasquez    Reason for call: Patient needs the office visit note to be faxed to [facility] today, patient has an appt at 2pm and PT Needs it so they can continue the PT on patient  Fax: [phone]    Call back#: [phone]

## 2023-06-27 ENCOUNTER — TELEPHONE (OUTPATIENT)
Dept: OBGYN CLINIC | Facility: HOSPITAL | Age: 78
End: 2023-06-27

## 2023-06-27 DIAGNOSIS — Z48.89 AFTERCARE FOLLOWING SURGERY: Primary | ICD-10-CM

## 2023-06-27 DIAGNOSIS — R29.898 WEAKNESS OF RIGHT LOWER EXTREMITY: ICD-10-CM

## 2023-06-27 DIAGNOSIS — M97.11XS PERIPROSTHETIC FRACTURE AROUND INTERNAL PROSTHETIC RIGHT KNEE JOINT, SEQUELA: ICD-10-CM

## 2023-06-27 DIAGNOSIS — Z87.81 S/P ORIF (OPEN REDUCTION INTERNAL FIXATION) FRACTURE: ICD-10-CM

## 2023-06-27 DIAGNOSIS — Z98.890 S/P ORIF (OPEN REDUCTION INTERNAL FIXATION) FRACTURE: ICD-10-CM

## 2023-06-27 NOTE — TELEPHONE ENCOUNTER
Caller: Wife    Doctor: Dr Denisa Mcintyre    Reason for call: Wife calling asking if an updated script for Physical Therapy can be faxed to the number below  PT is asking for updated referral   Wife asking if she can receive a call back when new referral is placed  Patient's next appt is 6/30/23      Attn: [facility]  Fax: [phone]    Call back#: [phone]

## 2023-06-27 NOTE — TELEPHONE ENCOUNTER
Physical therapy script was faxed to provided number with atten to vin pt  Lm for pt's wife that the pt script was placed in chart and faxed

## 2023-07-25 ENCOUNTER — HOSPITAL ENCOUNTER (OUTPATIENT)
Dept: RADIOLOGY | Facility: HOSPITAL | Age: 78
Discharge: HOME | End: 2023-07-25
Attending: ORTHOPAEDIC SURGERY
Payer: MEDICARE

## 2023-07-25 ENCOUNTER — OFFICE VISIT (OUTPATIENT)
Dept: OBGYN CLINIC | Facility: HOSPITAL | Age: 78
End: 2023-07-25
Payer: MEDICARE

## 2023-07-25 VITALS
BODY MASS INDEX: 37.87 KG/M2 | HEIGHT: 73 IN | HEART RATE: 84 BPM | DIASTOLIC BLOOD PRESSURE: 77 MMHG | SYSTOLIC BLOOD PRESSURE: 136 MMHG

## 2023-07-25 DIAGNOSIS — Z98.890 S/P ORIF (OPEN REDUCTION INTERNAL FIXATION) FRACTURE: ICD-10-CM

## 2023-07-25 DIAGNOSIS — Z87.81 S/P ORIF (OPEN REDUCTION INTERNAL FIXATION) FRACTURE: ICD-10-CM

## 2023-07-25 DIAGNOSIS — M97.11XS PERIPROSTHETIC FRACTURE AROUND INTERNAL PROSTHETIC RIGHT KNEE JOINT, SEQUELA: ICD-10-CM

## 2023-07-25 DIAGNOSIS — R29.898 WEAKNESS OF RIGHT LOWER EXTREMITY: ICD-10-CM

## 2023-07-25 DIAGNOSIS — Z48.89 AFTERCARE FOLLOWING SURGERY: Primary | ICD-10-CM

## 2023-07-25 DIAGNOSIS — Z48.89 AFTERCARE FOLLOWING SURGERY: ICD-10-CM

## 2023-07-25 PROCEDURE — 73560 X-RAY EXAM OF KNEE 1 OR 2: CPT

## 2023-07-25 PROCEDURE — 99213 OFFICE O/P EST LOW 20 MIN: CPT | Performed by: ORTHOPAEDIC SURGERY

## 2023-07-25 NOTE — PROGRESS NOTES
Assessment:  1. Aftercare following surgery  XR knee 1 or 2 vw right    Ambulatory referral to Physical Therapy      2. S/P ORIF (open reduction internal fixation) fracture  Ambulatory referral to Physical Therapy      3. 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 Weakness of right lower extremity  Ambulatory referral to Physical Therapy          Plan:  9 months s/p right knee periprosthetic ORIF, 11/3/2022. · Radiograph displays increased callus formation and fracture is considered healed  · Patient should continue physical therapy as needed  · He is encouraged to be active including walking   · Follow up in 3 months        To do next visit:  Return in about 3 months (around 10/25/2023) for re-check with x-rays. The above stated was discussed in layman's terms and the patient expressed understanding. All questions were answered to the patient's satisfaction. Scribe Attestation    I,:  Prasanth Agustin am acting as a scribe while in the presence of the attending physician.:       I,:  Giuseppe Thompson MD personally performed the services described in this documentation    as scribed in my presence.:             Subjective:   Zhanna Morgan is a 69 y.o. male who presents 9 months s/p right knee periprosthetic ORIF, 11/3/2022. He is progressing. Today he complains of improving right anteromedial knee pain with remaining right anterolateral thigh numbness. He continues physical therapy  He does use Tylenol 1x/day with benefit. He does use walker yet slowly transitioning to cane. Review of systems negative unless otherwise specified in HPI    History reviewed. No pertinent past medical history.     Past Surgical History:   Procedure Laterality Date   • ORIF PERIPROSTHETIC KNEE Right 11/3/2022    Procedure: OPEN REDUCTION W/ INTERNAL FIXATION (ORIF) PERIPROSTHETIC KNEE;  Surgeon: Giuseppe Thompson MD;  Location:  [facility];  Service: Orthopedics       History reviewed. No pertinent family history.     Social History     Occupational History   • Not on file   Tobacco Use   • Smoking status: Former   • Smokeless tobacco: Former   Substance and Sexual Activity   • Alcohol use: Not Currently   • Drug use: Not on file   • Sexual activity: Not on file         Current Outpatient Medications:   •  acetaminophen (TYLENOL) 325 mg tablet, Take 650 mg by mouth, Disp: , Rfl:   •  aspirin (ECOTRIN LOW STRENGTH) 81 mg EC tablet, Take 81 mg by mouth daily, Disp: , Rfl:   •  aspirin 325 mg tablet, Take 325 mg by mouth (Patient not taking: Reported on 4/13/2023), Disp: , Rfl:   •  aspirin 81 mg chewable tablet, Chew 81 mg daily, Disp: , Rfl:   •  atorvastatin (LIPITOR) 40 mg tablet, Take 40 mg by mouth daily, Disp: , Rfl:   •  carboxymethylcellulose (REFRESH PLUS) 0.5 % SOLN, 1 drop, Disp: , Rfl:   •  Eliquis 2.5 MG, , Disp: , Rfl:   •  enoxaparin (LOVENOX) 40 mg/0.4 mL, Inject 0.4 mL (40 mg total) under the skin daily in the early morning for 28 days, Disp: 11.2 mL, Rfl: 0  •  ezetimibe (ZETIA) 10 mg tablet, Take 10 mg by mouth daily, Disp: , Rfl:   •  ferrous sulfate 325 (65 Fe) mg tablet, Take by mouth, Disp: , Rfl:   •  fluticasone (FLONASE) 50 mcg/act nasal spray, 2 sprays into each nostril daily, Disp: , Rfl:   •  fluticasone (FLONASE) 50 mcg/act nasal spray, 1 spray (Patient not taking: Reported on 4/13/2023), Disp: , Rfl:   •  HYDROcodone-acetaminophen (NORCO) 7.5-325 mg per tablet, Take by mouth (Patient not taking: Reported on 4/13/2023), Disp: , Rfl:   •  hydrocortisone 1 % cream, Apply topically 4 (four) times a day as needed for rash, Disp: 30 g, Rfl: 0  •  metoprolol succinate (TOPROL-XL) 25 mg 24 hr tablet, Take 25 mg by mouth daily at bedtime, Disp: , Rfl:   •  Multiple Vitamin (multivitamin) tablet, Take 1 tablet by mouth daily, Disp: , Rfl:   •  oxyCODONE (ROXICODONE) 5 immediate release tablet, 2.5 mg to 5 mg PO every 4 hours as needed for moderate to severe pain. Ongoing therapy. (Patient not taking: Reported on 4/13/2023), Disp: 20 tablet, Rfl: 0  •  polyvinyl alcohol (LIQUIFILM TEARS) 1.4 % ophthalmic solution, Administer 1 drop to both eyes as needed for dry eyes, Disp: , Rfl:   •  Polyvinyl Alcohol-Povidone PF 1.4-0.6 % SOLN, once daily, Disp: , Rfl:   •  rOPINIRole (REQUIP) 1 mg tablet, , Disp: , Rfl:   •  sulfamethoxazole-trimethoprim (BACTRIM DS) 800-160 mg per tablet, , Disp: , Rfl:   •  tamsulosin (FLOMAX) 0.4 mg, 0.4 mg by oral route., Disp: , Rfl:     No Known Allergies         Vitals:    07/25/23 1257   BP: 136/77   Pulse: 84       Objective:  Physical exam  · General: Awake, Alert, Oriented  · Eyes: Pupils equal, round and reactive to light  · Heart: regular rate and rhythm  · Lungs: No audible wheezing  · Abdomen: soft                    Ortho Exam  Right knee:  Well healed anterior incisional scar  Patient able to extend knee  No erythema or ecchymosis  No effusion or swelling  Normal strength  Good ROM 5-95  Calf compartments soft and supple  Sensation intact  Toes are warm sensate and mobile    Diagnostics, reviewed and taken today if performed as documented: The attending physician has personally reviewed the pertinent films in PACS and interpretation is as follows:  Right femur x-ray:  Healed periprosthetic fracture s/p TKA.  No hardware failure. Procedures, if performed today:    Procedures    None performed      Portions of the record may have been created with voice recognition software. Occasional wrong word or "sound a like" substitutions may have occurred due to the inherent limitations of voice recognition software. Read the chart carefully and recognize, using context, where substitutions have occurred.

## 2023-08-30 DIAGNOSIS — Z48.89 AFTERCARE FOLLOWING SURGERY FOR INJURY AND TRAUMA: Primary | ICD-10-CM

## 2023-10-18 DIAGNOSIS — Z48.89 AFTERCARE FOLLOWING SURGERY: Primary | ICD-10-CM

## 2023-10-25 ENCOUNTER — OFFICE VISIT (OUTPATIENT)
Dept: OBGYN CLINIC | Facility: HOSPITAL | Age: 78
End: 2023-10-25
Payer: MEDICARE

## 2023-10-25 ENCOUNTER — HOSPITAL ENCOUNTER (OUTPATIENT)
Dept: RADIOLOGY | Facility: HOSPITAL | Age: 78
Discharge: HOME | End: 2023-10-25
Attending: ORTHOPAEDIC SURGERY
Payer: MEDICARE

## 2023-10-25 VITALS
HEART RATE: 67 BPM | BODY MASS INDEX: 37.87 KG/M2 | SYSTOLIC BLOOD PRESSURE: 125 MMHG | HEIGHT: 73 IN | DIASTOLIC BLOOD PRESSURE: 70 MMHG

## 2023-10-25 DIAGNOSIS — Z87.81 S/P ORIF (OPEN REDUCTION INTERNAL FIXATION) FRACTURE: Primary | ICD-10-CM

## 2023-10-25 DIAGNOSIS — M97.11XS PERIPROSTHETIC FRACTURE AROUND INTERNAL PROSTHETIC RIGHT KNEE JOINT, SEQUELA: ICD-10-CM

## 2023-10-25 DIAGNOSIS — Z98.890 S/P ORIF (OPEN REDUCTION INTERNAL FIXATION) FRACTURE: Primary | ICD-10-CM

## 2023-10-25 DIAGNOSIS — Z48.89 AFTERCARE FOLLOWING SURGERY: ICD-10-CM

## 2023-10-25 PROCEDURE — 99213 OFFICE O/P EST LOW 20 MIN: CPT | Performed by: ORTHOPAEDIC SURGERY

## 2023-10-25 PROCEDURE — 73560 X-RAY EXAM OF KNEE 1 OR 2: CPT

## 2023-10-25 RX ORDER — MIRABEGRON 25 MG/1
TABLET, FILM COATED, EXTENDED RELEASE ORAL
COMMUNITY
Start: 2023-10-10

## 2023-10-25 RX ORDER — SOD SULF/POT CHLORIDE/MAG SULF 1.479 G
TABLET ORAL
COMMUNITY
Start: 2023-09-15

## 2023-10-25 NOTE — PROGRESS NOTES
Assessment:   Diagnosis ICD-10-CM Associated Orders   1. S/P ORIF (open reduction internal fixation) fracture  Z98.890 Ambulatory Referral to Physical Therapy    Z87.81       2. 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 11XS Ambulatory Referral to Physical Therapy          Plan:  X-rays taken and reviewed, physical exam performed. Nearly 1 year status post ORIF right periprosthetic distal femur fracture fixation. Radiographically there is a defect in the plate which was not present at his last set of radiographs however is stable to varus and valgus stressing today. Continue physical therapy to maximize recovery. Weightbearing and activities as tolerated. To do next visit:  Return in about 4 weeks (around 11/22/2023) for re-check with x-rays right knee. The above stated was discussed in layman's terms and the patient expressed understanding. All questions were answered to the patient's satisfaction. Scribe Attestation      I,:  Vahe Bender am acting as a scribe while in the presence of the attending physician.:       I,:  Treva Palacio MD personally performed the services described in this documentation    as scribed in my presence.:               Subjective:   Nay Santos is a 69 y.o. male who presents today for repeat evaluation of his right lower extremity. He is nearly 1 year status post ORIF right distal femur periprosthetic fracture from 11/3/2022. He was hospitalized last week for several days due to A-fib. He has been attending physical therapy closer to home, [facility] physical therapy. He presents using a cane but at times resorts to using a walker. He denies any significant mount of pain of his right lower extremity. He does have an area of numbness throughout his distal thigh and knee anteriorly. Review of systems negative unless otherwise specified in HPI  Review of Systems    No past medical history on file.     Past Surgical History:   Procedure Laterality Date    ORIF PERIPROSTHETIC KNEE Right 11/3/2022    Procedure: OPEN REDUCTION W/ INTERNAL FIXATION (ORIF) PERIPROSTHETIC KNEE;  Surgeon: Julissa Mock MD;  Location: [facility];  Service: Orthopedics       No family history on file.     Social History     Occupational History    Not on file   Tobacco Use    Smoking status: Former    Smokeless tobacco: Former   Substance and Sexual Activity    Alcohol use: Not Currently    Drug use: Not on file    Sexual activity: Not on file         Current Outpatient Medications:     Myrbetriq 25 MG TB24, , Disp: , Rfl:     Sutab 6383-471-149 MG TABS, , Disp: , Rfl:     acetaminophen (TYLENOL) 325 mg tablet, Take 650 mg by mouth, Disp: , Rfl:     aspirin (ECOTRIN LOW STRENGTH) 81 mg EC tablet, Take 81 mg by mouth daily, Disp: , Rfl:     aspirin 325 mg tablet, Take 325 mg by mouth (Patient not taking: Reported on 4/13/2023), Disp: , Rfl:     aspirin 81 mg chewable tablet, Chew 81 mg daily, Disp: , Rfl:     atorvastatin (LIPITOR) 40 mg tablet, Take 40 mg by mouth daily, Disp: , Rfl:     carboxymethylcellulose (REFRESH PLUS) 0.5 % SOLN, 1 drop, Disp: , Rfl:     Eliquis 2.5 MG, , Disp: , Rfl:     enoxaparin (LOVENOX) 40 mg/0.4 mL, Inject 0.4 mL (40 mg total) under the skin daily in the early morning for 28 days, Disp: 11.2 mL, Rfl: 0    ezetimibe (ZETIA) 10 mg tablet, Take 10 mg by mouth daily, Disp: , Rfl:     ferrous sulfate 325 (65 Fe) mg tablet, Take by mouth, Disp: , Rfl:     fluticasone (FLONASE) 50 mcg/act nasal spray, 2 sprays into each nostril daily, Disp: , Rfl:     fluticasone (FLONASE) 50 mcg/act nasal spray, 1 spray (Patient not taking: Reported on 4/13/2023), Disp: , Rfl:     HYDROcodone-acetaminophen (NORCO) 7.5-325 mg per tablet, Take by mouth (Patient not taking: Reported on 4/13/2023), Disp: , Rfl:     hydrocortisone 1 % cream, Apply topically 4 (four) times a day as needed for rash, Disp: 30 g, Rfl: 0    LYCOPENE PO, Take 1 tablet by mouth daily, Disp: , Rfl:     metoprolol succinate (TOPROL-XL) 25 mg 24 hr tablet, Take 25 mg by mouth daily at bedtime, Disp: , Rfl:     Multiple Vitamin (multivitamin) tablet, Take 1 tablet by mouth daily, Disp: , Rfl:     oxyCODONE (ROXICODONE) 5 immediate release tablet, 2.5 mg to 5 mg PO every 4 hours as needed for moderate to severe pain. Ongoing therapy. (Patient not taking: Reported on 4/13/2023), Disp: 20 tablet, Rfl: 0    polyvinyl alcohol (LIQUIFILM TEARS) 1.4 % ophthalmic solution, Administer 1 drop to both eyes as needed for dry eyes, Disp: , Rfl:     Polyvinyl Alcohol-Povidone PF 1.4-0.6 % SOLN, once daily, Disp: , Rfl:     rOPINIRole (REQUIP) 1 mg tablet, , Disp: , Rfl:     sulfamethoxazole-trimethoprim (BACTRIM DS) 800-160 mg per tablet, , Disp: , Rfl:     tamsulosin (FLOMAX) 0.4 mg, 0.4 mg by oral route., Disp: , Rfl:     No Known Allergies         Vitals:    10/25/23 1404   BP: 125/70   Pulse: 67       Objective:                    Right Knee Exam     Tenderness   The patient is experiencing no tenderness. Range of Motion   Extension:  5   Flexion:  100     Other   Erythema: absent  Sensation: normal  Swelling: mild  Effusion: effusion present    Comments:    Intact extensor mechanism  Healed anterior incision  Stable to varus and valgus stressing without laxity. Edema distally            Diagnostics, reviewed and taken today if performed as documented: The attending physician has personally reviewed the pertinent films in PACS and interpretation is as follows:    Right knee x-rays taken and reviewed in the office today and show: Well aligned periprosthetic distal femur fracture status post open reduction internal fixation with plate and multiple screws. Small fissure/crack defect of the plate noted which was not there last x-rays.       Procedures, if performed today:    Procedures    None performed      Portions of the record may have been created with voice recognition software. Occasional wrong word or "sound a like" substitutions may have occurred due to the inherent limitations of voice recognition software. Read the chart carefully and recognize, using context, where substitutions have occurred.

## 2023-11-13 DIAGNOSIS — Z48.89 AFTERCARE FOLLOWING SURGERY: Primary | ICD-10-CM

## 2023-11-22 ENCOUNTER — HOSPITAL ENCOUNTER (OUTPATIENT)
Dept: RADIOLOGY | Facility: HOSPITAL | Age: 78
Discharge: HOME | End: 2023-11-22
Attending: ORTHOPAEDIC SURGERY
Payer: MEDICARE

## 2023-11-22 ENCOUNTER — OFFICE VISIT (OUTPATIENT)
Dept: OBGYN CLINIC | Facility: HOSPITAL | Age: 78
End: 2023-11-22
Payer: MEDICARE

## 2023-11-22 VITALS
BODY MASS INDEX: 37.87 KG/M2 | DIASTOLIC BLOOD PRESSURE: 79 MMHG | HEART RATE: 71 BPM | HEIGHT: 73 IN | SYSTOLIC BLOOD PRESSURE: 132 MMHG

## 2023-11-22 DIAGNOSIS — Z87.81 S/P ORIF (OPEN REDUCTION INTERNAL FIXATION) FRACTURE: ICD-10-CM

## 2023-11-22 DIAGNOSIS — Z48.89 AFTERCARE FOLLOWING SURGERY: Primary | ICD-10-CM

## 2023-11-22 DIAGNOSIS — Z98.890 S/P ORIF (OPEN REDUCTION INTERNAL FIXATION) FRACTURE: ICD-10-CM

## 2023-11-22 DIAGNOSIS — R29.898 WEAKNESS OF RIGHT LOWER EXTREMITY: ICD-10-CM

## 2023-11-22 DIAGNOSIS — M97.11XS PERIPROSTHETIC FRACTURE AROUND INTERNAL PROSTHETIC RIGHT KNEE JOINT, SEQUELA: ICD-10-CM

## 2023-11-22 DIAGNOSIS — Z48.89 AFTERCARE FOLLOWING SURGERY: ICD-10-CM

## 2023-11-22 PROCEDURE — 73560 X-RAY EXAM OF KNEE 1 OR 2: CPT

## 2023-11-22 PROCEDURE — 99213 OFFICE O/P EST LOW 20 MIN: CPT | Performed by: ORTHOPAEDIC SURGERY

## 2023-11-22 RX ORDER — APIXABAN 5 MG/1
TABLET, FILM COATED ORAL
COMMUNITY
Start: 2023-11-06

## 2023-11-22 RX ORDER — METOPROLOL SUCCINATE 50 MG/1
TABLET, EXTENDED RELEASE ORAL
COMMUNITY
Start: 2023-11-06

## 2023-11-22 NOTE — PROGRESS NOTES
Assessment:   Diagnosis ICD-10-CM Associated Orders   1. Aftercare following surgery  Z48.89 Ambulatory Referral to Physical Therapy      2. S/P ORIF (open reduction internal fixation) fracture  Z98.890 Ambulatory Referral to Physical Therapy    Z87.81       3. 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 11XS Ambulatory Referral to Physical Therapy      4. Weakness of right lower extremity  R29.898 Ambulatory Referral to Physical Therapy          Plan:  X-rays taken and reviewed, physical exam performed  Stable exam with relatively unchanged or slight change of his retained hardware when compared to previous radiographs   Recommend continuation of PT with gradual progression as tolerated  Weight bear as tolerated  Walker to cane progression as tolerated. To do next visit:  Return in about 6 weeks (around 1/3/2024) for re-check with x-rays right femur. The above stated was discussed in layman's terms and the patient expressed understanding. All questions were answered to the patient's satisfaction. Scribe Attestation      I,:  Kirti Colon am acting as a scribe while in the presence of the attending physician.:       I,:  Lukas Duval MD personally performed the services described in this documentation    as scribed in my presence.:               Subjective:   Kayla Hampton is a 69 y.o. male who presents today with his wife for repeat evaluation of his right lower extremity. He is just over 1 yr s/p ORIF right distal femur periprosthetic fracture fixation. At his visit last month his x-rays showed a defect in his plate. He had pain following his office visit and his PT eased back on some of the exercises. He does feel better today than a few weeks ago but not quite to where he was prior to last month's visit. He presents using a walker. Review of systems negative unless otherwise specified in HPI  Review of Systems    History reviewed.  No pertinent past medical history. Past Surgical History:   Procedure Laterality Date    ORIF PERIPROSTHETIC KNEE Right 11/3/2022    Procedure: OPEN REDUCTION W/ INTERNAL FIXATION (ORIF) PERIPROSTHETIC KNEE;  Surgeon: Nasrin Franklin MD;  [facility]: [facility];  Service: Orthopedics       History reviewed. No pertinent family history.     Social History     Occupational History    Not on file   Tobacco Use    Smoking status: Former    Smokeless tobacco: Former   Substance and Sexual Activity    Alcohol use: Not Currently    Drug use: Not on file    Sexual activity: Not on file         Current Outpatient Medications:     Eliquis 5 MG, , Disp: , Rfl:     metoprolol succinate (TOPROL-XL) 50 mg 24 hr tablet, , Disp: , Rfl:     acetaminophen (TYLENOL) 325 mg tablet, Take 650 mg by mouth, Disp: , Rfl:     aspirin (ECOTRIN LOW STRENGTH) 81 mg EC tablet, Take 81 mg by mouth daily, Disp: , Rfl:     atorvastatin (LIPITOR) 40 mg tablet, Take 40 mg by mouth daily, Disp: , Rfl:     carboxymethylcellulose (REFRESH PLUS) 0.5 % SOLN, 1 drop, Disp: , Rfl:     ezetimibe (ZETIA) 10 mg tablet, Take 10 mg by mouth daily, Disp: , Rfl:     fluticasone (FLONASE) 50 mcg/act nasal spray, 1 spray (Patient not taking: Reported on 4/13/2023), Disp: , Rfl:     Multiple Vitamin (multivitamin) tablet, Take 1 tablet by mouth daily, Disp: , Rfl:     Myrbetriq 25 MG TB24, , Disp: , Rfl:     polyvinyl alcohol (LIQUIFILM TEARS) 1.4 % ophthalmic solution, Administer 1 drop to both eyes as needed for dry eyes, Disp: , Rfl:     Polyvinyl Alcohol-Povidone PF 1.4-0.6 % SOLN, once daily, Disp: , Rfl:     rOPINIRole (REQUIP) 1 mg tablet, , Disp: , Rfl:     sulfamethoxazole-trimethoprim (BACTRIM DS) 800-160 mg per tablet, , Disp: , Rfl:     Sutab 3432-007-730 MG TABS, , Disp: , Rfl:     tamsulosin (FLOMAX) 0.4 mg, 0.4 mg by oral route., Disp: , Rfl:     No Known Allergies         Vitals:    11/22/23 1510   BP: 132/79   Pulse: 71       Body mass index is 37.87 kg/m². Wt Readings from Last 3 Encounters:   04/13/23 130 kg (287 lb)   11/02/22 131 kg (287 lb 14.7 oz)   11/02/22 131 kg (288 lb)       Objective:                    Right Knee Exam     Tenderness   The patient is experiencing no tenderness. Range of Motion   Extension:  5   Flexion:  100     Other   Erythema: absent  Sensation: normal  Swelling: mild  Effusion: effusion present    Comments:    Intact extensor mechanism  Healed anterior incision  Stable to varus and valgus stressing without laxity. Edema distally            Diagnostics, reviewed and taken today if performed as documented: The attending physician has personally reviewed the pertinent films in PACS and interpretation is as follows:    Right femur x-rays taken and reviewed in the office today and show: unchanged alignment periprosthetic distal femur fracture status post ORIF with plate and multiple screws. Slight progression of the crack in the plate compared to previous x-ray      Procedures, if performed today:    Procedures    None performed      Portions of the record may have been created with voice recognition software. Occasional wrong word or "sound a like" substitutions may have occurred due to the inherent limitations of voice recognition software. Read the chart carefully and recognize, using context, where substitutions have occurred.

## 2023-12-28 DIAGNOSIS — Z48.89 AFTERCARE FOLLOWING SURGERY: Primary | ICD-10-CM

## 2024-01-11 ENCOUNTER — OFFICE VISIT (OUTPATIENT)
Dept: OBGYN CLINIC | Facility: HOSPITAL | Age: 79
End: 2024-01-11
Payer: MEDICARE

## 2024-01-11 ENCOUNTER — HOSPITAL ENCOUNTER (OUTPATIENT)
Dept: RADIOLOGY | Facility: HOSPITAL | Age: 79
Discharge: HOME | End: 2024-01-11
Attending: ORTHOPAEDIC SURGERY
Payer: MEDICARE

## 2024-01-11 VITALS
SYSTOLIC BLOOD PRESSURE: 152 MMHG | BODY MASS INDEX: 37.87 KG/M2 | DIASTOLIC BLOOD PRESSURE: 63 MMHG | HEART RATE: 77 BPM | HEIGHT: 73 IN

## 2024-01-11 DIAGNOSIS — M97.11XS PERIPROSTHETIC FRACTURE AROUND INTERNAL PROSTHETIC RIGHT KNEE JOINT, SEQUELA: ICD-10-CM

## 2024-01-11 DIAGNOSIS — Z48.89 AFTERCARE FOLLOWING SURGERY: Primary | ICD-10-CM

## 2024-01-11 DIAGNOSIS — Z98.890 S/P ORIF (OPEN REDUCTION INTERNAL FIXATION) FRACTURE: ICD-10-CM

## 2024-01-11 DIAGNOSIS — Z87.81 S/P ORIF (OPEN REDUCTION INTERNAL FIXATION) FRACTURE: ICD-10-CM

## 2024-01-11 DIAGNOSIS — Z48.89 AFTERCARE FOLLOWING SURGERY: ICD-10-CM

## 2024-01-11 PROCEDURE — 73552 X-RAY EXAM OF FEMUR 2/>: CPT

## 2024-01-11 PROCEDURE — 99213 OFFICE O/P EST LOW 20 MIN: CPT | Performed by: ORTHOPAEDIC SURGERY

## 2024-01-11 RX ORDER — CEFDINIR 300 MG/1
CAPSULE ORAL
COMMUNITY
Start: 2023-12-18

## 2024-01-11 RX ORDER — METOPROLOL SUCCINATE 25 MG/1
TABLET, EXTENDED RELEASE ORAL
COMMUNITY
Start: 2024-01-02

## 2024-01-11 NOTE — PROGRESS NOTES
Assessment:   Diagnosis ICD-10-CM Associated Orders   1. Aftercare following surgery  Z48.89 Ambulatory Referral to Physical Therapy      2. S/P ORIF (open reduction internal fixation) fracture  Z98.890 Ambulatory Referral to Physical Therapy    Z87.81       3. 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   Stable exam with relatively unchanged or slight change of his retained hardware when compared to previous radiographs.   Recommend continuation of PT with gradual progression as tolerated.  New physical therapy referral was given today.  Weight bear as tolerated  Walker to cane progression as tolerated.    To do next visit:  Return in about 2 months (around 3/11/2024) for Recheck and repeat x-rays.    The above stated was discussed in layman's terms and the patient expressed understanding.  All questions were answered to the patient's satisfaction.       Scribe Attestation      I,:  Americo Rm am acting as a scribe while in the presence of the attending physician.:       I,:  Umberto Irby MD personally performed the services described in this documentation    as scribed in my presence.:               Subjective:   Osmin Plummer is a 78 y.o. male who presents today for repeat evaluation of his right lower extremity.  He is just over 1 year status post ORIF right distal femur periprosthetic fracture fixation.  Patient was last seen 11/22/2023 where x-rays demonstrated slight change in his retained hardware we advised him to continue PT and continue weightbearing as tolerated. Patient states today he is doing well as he continues to go to PT and continues to weight bear as tolerated.         Review of systems negative unless otherwise specified in HPI  Review of Systems   Constitutional:   Positive for activity change. Negative for chills, fever and unexpected weight change.   HENT:  Negative for hearing loss, nosebleeds and sore throat.    Eyes:  Negative for pain, redness and visual disturbance.   Respiratory:  Negative for cough, shortness of breath and wheezing.    Cardiovascular:  Negative for chest pain, palpitations and leg swelling.   Gastrointestinal:  Negative for abdominal pain, nausea and vomiting.   Endocrine: Negative for polyphagia and polyuria.   Genitourinary:  Negative for dysuria and hematuria.   Musculoskeletal:         See HPI   Skin:  Negative for rash and wound.   Neurological:  Negative for dizziness, numbness and headaches.   Psychiatric/Behavioral:  Negative for decreased concentration and suicidal ideas. The patient is not nervous/anxious.        Past Medical History:   Diagnosis Date    Atrial fibrillation (HCC)        Past Surgical History:   Procedure Laterality Date    ORIF PERIPROSTHETIC KNEE Right 11/3/2022    Procedure: OPEN REDUCTION W/ INTERNAL FIXATION (ORIF) PERIPROSTHETIC KNEE;  Surgeon: Umberto Irby MD;  Location: [facility];  Service: Orthopedics       History reviewed. No pertinent family history.    Social History     Occupational History    Not on file   Tobacco Use    Smoking status: Former    Smokeless tobacco: Former   Substance and Sexual Activity    Alcohol use: Not Currently    Drug use: Not on file    Sexual activity: Not on file         Current Outpatient Medications:     acetaminophen (TYLENOL) 325 mg tablet, Take 650 mg by mouth, Disp: , Rfl:     aspirin (ECOTRIN LOW STRENGTH) 81 mg EC tablet, Take 81 mg by mouth daily, Disp: , Rfl:     atorvastatin (LIPITOR) 40 mg tablet, Take 40 mg by mouth daily, Disp: , Rfl:     carboxymethylcellulose (REFRESH PLUS) 0.5 % SOLN, 1 drop, Disp: , Rfl:     cefdinir (OMNICEF) 300 mg capsule, , Disp: , Rfl:     Eliquis 5 MG, , Disp: , Rfl:     ezetimibe (ZETIA) 10 mg tablet, Take 10 mg by mouth daily, Disp:  , Rfl:     Ferrous Sulfate 5 MG/20ML SOLN, Take by mouth (Patient not taking: Reported on 1/11/2024), Disp: , Rfl:     fluticasone (FLONASE) 50 mcg/act nasal spray, 1 spray (Patient not taking: Reported on 4/13/2023), Disp: , Rfl:     metoprolol succinate (TOPROL-XL) 25 mg 24 hr tablet, , Disp: , Rfl:     metoprolol succinate (TOPROL-XL) 50 mg 24 hr tablet, , Disp: , Rfl:     Multiple Vitamin (multivitamin) tablet, Take 1 tablet by mouth daily, Disp: , Rfl:     Myrbetriq 25 MG TB24, , Disp: , Rfl:     polyvinyl alcohol (LIQUIFILM TEARS) 1.4 % ophthalmic solution, Administer 1 drop to both eyes as needed for dry eyes, Disp: , Rfl:     Polyvinyl Alcohol-Povidone PF 1.4-0.6 % SOLN, once daily, Disp: , Rfl:     rOPINIRole (REQUIP) 1 mg tablet, , Disp: , Rfl:     sulfamethoxazole-trimethoprim (BACTRIM DS) 800-160 mg per tablet, , Disp: , Rfl:     Sutab 1206-308-611 MG TABS, , Disp: , Rfl:     tamsulosin (FLOMAX) 0.4 mg, 0.4 mg by oral route., Disp: , Rfl:     No Known Allergies         Vitals:    01/11/24 1343   BP: 152/63   Pulse: 77         Body mass index is 37.87 kg/m².  Wt Readings from Last 3 Encounters:   04/13/23 130 kg (287 lb)   11/02/22 131 kg (287 lb 14.7 oz)   11/02/22 131 kg (288 lb)       Objective:                    Right Knee Exam     Tenderness   The patient is experiencing no tenderness.     Range of Motion   Extension:  5   Flexion:  100     Other   Erythema: absent  Sensation: normal  Swelling: mild  Effusion: effusion present    Comments:    Intact extensor mechanism  Healed anterior incision  Stable to varus and valgus stressing without laxity.  Edema distally            Diagnostics, reviewed and taken today if performed as documented:    The attending physician has personally reviewed the pertinent films in PACS and interpretation is as follows:    Right femur x-rays taken and reviewed in the office today and show: unchanged alignment periprosthetic distal femur fracture status post ORIF with  "plate and multiple screws from previous radiographic imaging.      Procedures, if performed today:    Procedures    None performed      Portions of the record may have been created with voice recognition software.  Occasional wrong word or \"sound a like\" substitutions may have occurred due to the inherent limitations of voice recognition software.  Read the chart carefully and recognize, using context, where substitutions have occurred.  "

## 2024-02-28 ENCOUNTER — TELEPHONE (OUTPATIENT)
Dept: OBGYN CLINIC | Facility: HOSPITAL | Age: 79
End: 2024-02-28

## 2024-02-28 DIAGNOSIS — Z87.81 S/P ORIF (OPEN REDUCTION INTERNAL FIXATION) FRACTURE: Primary | ICD-10-CM

## 2024-02-28 DIAGNOSIS — Z98.890 S/P ORIF (OPEN REDUCTION INTERNAL FIXATION) FRACTURE: Primary | ICD-10-CM

## 2024-02-28 NOTE — TELEPHONE ENCOUNTER
Mahamed     Pt is requesting a new script for PT. Next PT appt is this Friday.     Please fax when completed  [phone]      Callback: [phone] can leave detailed message

## 2024-03-06 DIAGNOSIS — Z48.89 AFTERCARE FOLLOWING SURGERY: Primary | ICD-10-CM

## 2024-03-19 ENCOUNTER — OFFICE VISIT (OUTPATIENT)
Dept: OBGYN CLINIC | Facility: HOSPITAL | Age: 79
End: 2024-03-19
Payer: MEDICARE

## 2024-03-19 ENCOUNTER — HOSPITAL ENCOUNTER (OUTPATIENT)
Dept: RADIOLOGY | Facility: HOSPITAL | Age: 79
Discharge: HOME | End: 2024-03-19
Attending: ORTHOPAEDIC SURGERY
Payer: MEDICARE

## 2024-03-19 VITALS
HEART RATE: 85 BPM | BODY MASS INDEX: 36.58 KG/M2 | HEIGHT: 73 IN | DIASTOLIC BLOOD PRESSURE: 87 MMHG | SYSTOLIC BLOOD PRESSURE: 131 MMHG | WEIGHT: 276 LBS

## 2024-03-19 DIAGNOSIS — Z98.890 S/P ORIF (OPEN REDUCTION INTERNAL FIXATION) FRACTURE: Primary | ICD-10-CM

## 2024-03-19 DIAGNOSIS — Z48.89 AFTERCARE FOLLOWING SURGERY: ICD-10-CM

## 2024-03-19 DIAGNOSIS — Z87.81 S/P ORIF (OPEN REDUCTION INTERNAL FIXATION) FRACTURE: Primary | ICD-10-CM

## 2024-03-19 DIAGNOSIS — M97.11XS PERIPROSTHETIC FRACTURE AROUND INTERNAL PROSTHETIC RIGHT KNEE JOINT, SEQUELA: ICD-10-CM

## 2024-03-19 PROCEDURE — 99213 OFFICE O/P EST LOW 20 MIN: CPT | Performed by: ORTHOPAEDIC SURGERY

## 2024-03-19 PROCEDURE — 73552 X-RAY EXAM OF FEMUR 2/>: CPT

## 2024-03-19 RX ORDER — AMIODARONE HYDROCHLORIDE 200 MG/1
TABLET ORAL
COMMUNITY
Start: 2024-02-19

## 2024-03-19 NOTE — PROGRESS NOTES
Assessment:   Diagnosis ICD-10-CM Associated Orders   1. S/P ORIF (open reduction internal fixation) fracture  Z98.890 Ambulatory Referral to Physical Therapy    Z87.81       2. 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   Recommend continuation of PT with gradual progression as tolerated.  New physical therapy referral was given today.  Weight bear as tolerated  Walker to cane progression as tolerated.    To do next visit:  Return in about 3 months (around 6/19/2024) for Recheck.    The above stated was discussed in layman's terms and the patient expressed understanding.  All questions were answered to the patient's satisfaction.       Subjective:   Osmin Plummer is a 78 y.o. male who presents today for repeat evaluation of his right lower extremity.  He is 1 year and 4 months status post ORIF right distal femur periprosthetic fracture fixation. He has been weightbearing as tolerated using a walker. He feels he has made some progress. Still feels he is not stable using a cane.      Review of systems negative unless otherwise specified in HPI  Review of Systems   Constitutional:  Positive for activity change. Negative for chills, fever and unexpected weight change.   HENT:  Negative for hearing loss, nosebleeds and sore throat.    Eyes:  Negative for pain, redness and visual disturbance.   Respiratory:  Negative for cough, shortness of breath and wheezing.    Cardiovascular:  Negative for chest pain, palpitations and leg swelling.   Gastrointestinal:  Negative for abdominal pain, nausea and vomiting.   Endocrine: Negative for polyphagia and polyuria.    Genitourinary:  Negative for dysuria and hematuria.   Musculoskeletal:         See HPI   Skin:  Negative for rash and wound.   Neurological:  Negative for dizziness, numbness and headaches.   Psychiatric/Behavioral:  Negative for decreased concentration and suicidal ideas. The patient is not nervous/anxious.        Past Medical History:   Diagnosis Date    Atrial fibrillation (HCC)        Past Surgical History:   Procedure Laterality Date    ORIF PERIPROSTHETIC KNEE Right 11/3/2022    Procedure: OPEN REDUCTION W/ INTERNAL FIXATION (ORIF) PERIPROSTHETIC KNEE;  Surgeon: Umberto Irby MD;  Location: [facility];  Service: Orthopedics       History reviewed. No pertinent family history.    Social History     Occupational History    Not on file   Tobacco Use    Smoking status: Former    Smokeless tobacco: Former   Substance and Sexual Activity    Alcohol use: Not Currently    Drug use: Not on file    Sexual activity: Not on file         Current Outpatient Medications:     acetaminophen (TYLENOL) 325 mg tablet, Take 650 mg by mouth, Disp: , Rfl:     amiodarone 200 mg tablet, , Disp: , Rfl:     aspirin (ECOTRIN LOW STRENGTH) 81 mg EC tablet, Take 81 mg by mouth daily, Disp: , Rfl:     atorvastatin (LIPITOR) 40 mg tablet, Take 40 mg by mouth daily, Disp: , Rfl:     carboxymethylcellulose (REFRESH PLUS) 0.5 % SOLN, 1 drop, Disp: , Rfl:     Eliquis 5 MG, , Disp: , Rfl:     ezetimibe (ZETIA) 10 mg tablet, Take 10 mg by mouth daily, Disp: , Rfl:     fluticasone (FLONASE) 50 mcg/act nasal spray, 1 spray, Disp: , Rfl:     Multiple Vitamin (multivitamin) tablet, Take 1 tablet by mouth daily, Disp: , Rfl:     Myrbetriq 25 MG TB24, , Disp: , Rfl:     polyvinyl alcohol (LIQUIFILM TEARS) 1.4 % ophthalmic solution, Administer 1 drop to both eyes as needed for dry eyes, Disp: , Rfl:     tamsulosin (FLOMAX) 0.4 mg, 0.4 mg by oral route., Disp: , Rfl:     cefdinir (OMNICEF) 300 mg capsule, , Disp: , Rfl:     Ferrous Sulfate 5  MG/20ML SOLN, Take by mouth (Patient not taking: Reported on 1/11/2024), Disp: , Rfl:     metoprolol succinate (TOPROL-XL) 25 mg 24 hr tablet, , Disp: , Rfl:     metoprolol succinate (TOPROL-XL) 50 mg 24 hr tablet, , Disp: , Rfl:     Polyvinyl Alcohol-Povidone PF 1.4-0.6 % SOLN, once daily (Patient not taking: Reported on 3/19/2024), Disp: , Rfl:     rOPINIRole (REQUIP) 1 mg tablet, , Disp: , Rfl:     sulfamethoxazole-trimethoprim (BACTRIM DS) 800-160 mg per tablet, , Disp: , Rfl:     Sutab 8203-468-498 MG TABS, , Disp: , Rfl:     No Known Allergies         Vitals:    03/19/24 1409   BP: 131/87   Pulse: 85         Body mass index is 36.41 kg/m².  Wt Readings from Last 3 Encounters:   03/19/24 125 kg (276 lb)   04/13/23 130 kg (287 lb)   11/02/22 131 kg (287 lb 14.7 oz)       Objective:                    Right Knee Exam     Tenderness   The patient is experiencing no tenderness.     Range of Motion   Extension:  5   Flexion:  110     Tests   Varus: negative Valgus: negative    Other   Erythema: absent  Sensation: normal  Swelling: mild  Effusion: no effusion present    Comments:    Intact extensor mechanism  Healed anterior incision  Stable to varus and valgus stressing without laxity.  Edema distally            Diagnostics, reviewed and taken today if performed as documented:    The attending physician has personally reviewed the pertinent films in PACS and interpretation is as follows:    Right femur x-rays taken and reviewed in the office today and show: unchanged alignment periprosthetic distal femur fracture status post ORIF with plate and multiple screws from previous radiographic imaging. Some interval healing noted      Procedures, if performed today:    Procedures    None performed

## 2024-05-21 DIAGNOSIS — Z87.81 S/P ORIF (OPEN REDUCTION INTERNAL FIXATION) FRACTURE: Primary | ICD-10-CM

## 2024-05-21 DIAGNOSIS — Z98.890 S/P ORIF (OPEN REDUCTION INTERNAL FIXATION) FRACTURE: Primary | ICD-10-CM

## 2024-06-13 DIAGNOSIS — M97.11XS PERIPROSTHETIC FRACTURE AROUND INTERNAL PROSTHETIC RIGHT KNEE JOINT, SEQUELA: Primary | ICD-10-CM

## 2024-06-26 RX ORDER — LANSOPRAZOLE 30 MG/1
CAPSULE, DELAYED RELEASE ORAL
COMMUNITY
Start: 2024-04-12

## 2024-06-26 RX ORDER — SILDENAFIL 100 MG/1
TABLET, FILM COATED ORAL
COMMUNITY
Start: 2024-04-16

## 2024-06-26 RX ORDER — CEPHALEXIN 500 MG/1
CAPSULE ORAL
COMMUNITY
Start: 2024-04-17

## 2024-06-27 ENCOUNTER — OFFICE VISIT (OUTPATIENT)
Dept: OBGYN CLINIC | Facility: HOSPITAL | Age: 79
End: 2024-06-27
Payer: MEDICARE

## 2024-06-27 ENCOUNTER — HOSPITAL ENCOUNTER (OUTPATIENT)
Dept: RADIOLOGY | Facility: HOSPITAL | Age: 79
Discharge: HOME | End: 2024-06-27
Attending: ORTHOPAEDIC SURGERY
Payer: MEDICARE

## 2024-06-27 VITALS
DIASTOLIC BLOOD PRESSURE: 70 MMHG | WEIGHT: 265 LBS | HEART RATE: 105 BPM | BODY MASS INDEX: 35.12 KG/M2 | SYSTOLIC BLOOD PRESSURE: 122 MMHG | HEIGHT: 73 IN

## 2024-06-27 DIAGNOSIS — R29.898 WEAKNESS OF RIGHT LOWER EXTREMITY: ICD-10-CM

## 2024-06-27 DIAGNOSIS — M97.11XS PERIPROSTHETIC FRACTURE AROUND INTERNAL PROSTHETIC RIGHT KNEE JOINT, SEQUELA: ICD-10-CM

## 2024-06-27 DIAGNOSIS — M97.11XS PERIPROSTHETIC FRACTURE AROUND INTERNAL PROSTHETIC RIGHT KNEE JOINT, SEQUELA: Primary | ICD-10-CM

## 2024-06-27 DIAGNOSIS — Z98.890 S/P ORIF (OPEN REDUCTION INTERNAL FIXATION) FRACTURE: ICD-10-CM

## 2024-06-27 DIAGNOSIS — Z48.89 AFTERCARE FOLLOWING SURGERY: ICD-10-CM

## 2024-06-27 DIAGNOSIS — Z87.81 S/P ORIF (OPEN REDUCTION INTERNAL FIXATION) FRACTURE: ICD-10-CM

## 2024-06-27 PROCEDURE — 73560 X-RAY EXAM OF KNEE 1 OR 2: CPT

## 2024-06-27 PROCEDURE — 99213 OFFICE O/P EST LOW 20 MIN: CPT | Performed by: ORTHOPAEDIC SURGERY

## 2024-06-27 RX ORDER — VIBEGRON 75 MG/1
TABLET, FILM COATED ORAL
COMMUNITY
Start: 2024-04-16

## 2024-06-27 NOTE — PROGRESS NOTES
Assessment:  1. 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 S/P ORIF (open reduction internal fixation) fracture  Ambulatory referral to Physical Therapy      3. Aftercare following surgery  Ambulatory referral to Physical Therapy      4. Weakness of right lower extremity  Ambulatory referral to Physical Therapy          Plan:  20 months s/p right periprosthetic ORIF, 11/3/2022.   Patient progressing with physical therapy  Continue physical therapy  Radiograph displays grossly stable periprosthetic fracture with small amount of drift increasing gap at lateral distal emur fixation flate fracture sight.  The idea of total knee arthroplasty revision with long stem was discussed as potential treatment option.   We will attain information of current total knee hardware  Follow up in 6 weeks        To do next visit:  Return in about 6 weeks (around 8/8/2024).    The above stated was discussed in layman's terms and the patient expressed understanding.  All questions were answered to the patient's satisfaction.       Scribe Attestation      I,:  Angel Saez am acting as a scribe while in the presence of the attending physician.:       I,:  Umberto Irby MD personally performed the services described in this documentation    as scribed in my presence.:               Subjective:   Osmin Plummer is a 78 y.o. male who presents 20 months s/p right periprosthetic ORIF, 11/3/2022.  He is doing well.  Today he complains of instances of right knee instability yet no pain.  He continues physical therapy 2x/week with benefit.  He did have recent heart ablation 6/18/2024 yet is doing well.        Review of systems negative unless otherwise specified in HPI    Past Medical History:   Diagnosis Date    Atrial fibrillation (HCC)        Past Surgical History:   Procedure Laterality Date    ORIF PERIPROSTHETIC KNEE Right 11/3/2022    Procedure: OPEN  REDUCTION W/ INTERNAL FIXATION (ORIF) PERIPROSTHETIC KNEE;  Surgeon: Umberto Irby MD;  Location: [facility];  Service: Orthopedics       No family history on file.    Social History     Occupational History    Not on file   Tobacco Use    Smoking status: Former    Smokeless tobacco: Former   Substance and Sexual Activity    Alcohol use: Not Currently    Drug use: Not on file    Sexual activity: Not on file         Current Outpatient Medications:     cephalexin (KEFLEX) 500 mg capsule, take 1 capsule by mouth every 6 hours for 7 days, Disp: , Rfl:     lansoprazole (PREVACID) 30 mg capsule, , Disp: , Rfl:     mupirocin (BACTROBAN) 2 % ointment, APPLY TOPICALLY TO THE AFFECTED AREA TWICE DAILY UNTIL HEALED, Disp: , Rfl:     sildenafil (VIAGRA) 100 mg tablet, TAKE 1 TABLET BY MOUTH 60 MINUTES BEFORE SEXUAL ACTIVITY, Disp: , Rfl:     Vibegron (Gemtesa) 75 MG TABS, Take by mouth, Disp: , Rfl:     acetaminophen (TYLENOL) 325 mg tablet, Take 650 mg by mouth, Disp: , Rfl:     amiodarone 200 mg tablet, , Disp: , Rfl:     aspirin (ECOTRIN LOW STRENGTH) 81 mg EC tablet, Take 81 mg by mouth daily, Disp: , Rfl:     atorvastatin (LIPITOR) 40 mg tablet, Take 40 mg by mouth daily, Disp: , Rfl:     carboxymethylcellulose (REFRESH PLUS) 0.5 % SOLN, 1 drop, Disp: , Rfl:     cefdinir (OMNICEF) 300 mg capsule, , Disp: , Rfl:     Eliquis 5 MG, , Disp: , Rfl:     ezetimibe (ZETIA) 10 mg tablet, Take 10 mg by mouth daily, Disp: , Rfl:     Ferrous Sulfate 5 MG/20ML SOLN, Take by mouth (Patient not taking: Reported on 1/11/2024), Disp: , Rfl:     fluticasone (FLONASE) 50 mcg/act nasal spray, 1 spray, Disp: , Rfl:     metoprolol succinate (TOPROL-XL) 25 mg 24 hr tablet, , Disp: , Rfl:     metoprolol succinate (TOPROL-XL) 50 mg 24 hr tablet, , Disp: , Rfl:     Multiple Vitamin (multivitamin) tablet, Take 1 tablet by mouth daily, Disp: , Rfl:     Myrbetriq 25 MG TB24, , Disp: , Rfl:     polyvinyl alcohol (LIQUIFILM TEARS) 1.4 %  "ophthalmic solution, Administer 1 drop to both eyes as needed for dry eyes, Disp: , Rfl:     Polyvinyl Alcohol-Povidone PF 1.4-0.6 % SOLN, once daily (Patient not taking: Reported on 3/19/2024), Disp: , Rfl:     rOPINIRole (REQUIP) 1 mg tablet, , Disp: , Rfl:     sulfamethoxazole-trimethoprim (BACTRIM DS) 800-160 mg per tablet, , Disp: , Rfl:     Sutab 1616-749-142 MG TABS, , Disp: , Rfl:     tamsulosin (FLOMAX) 0.4 mg, 0.4 mg by oral route., Disp: , Rfl:     No Known Allergies         Vitals:    06/27/24 1446   BP: 122/70   Pulse: 105       Objective:  Physical exam  General: Awake, Alert, Oriented  Eyes: Pupils equal, round and reactive to light  Heart: regular rate and rhythm  Lungs: No audible wheezing  Abdomen: soft                    Ortho Exam  Right knee:  Patient uses walker for ambulation  Well healed anterior incision  No erythema and no ecchymosis  Stable to varus and valgus stress  Extensor mechanism intact  Extension near full  Flexion 110  Calf compartments soft and supple  Sensation intact  Toes are warm sensate and mobile      Diagnostics, reviewed and taken today if performed as documented:    The attending physician has personally reviewed the pertinent films in PACS and interpretation is as follows:  Right knee/femur x-ray:  s/p total knee arthroplasty.  Distal femur periprosthetic fracture remains grossly intact and stable with small amount of drift visibly increasing gap of lateral surgical plate fracture site.      Procedures, if performed today:    Procedures    None performed      Portions of the record may have been created with voice recognition software.  Occasional wrong word or \"sound a like\" substitutions may have occurred due to the inherent limitations of voice recognition software.  Read the chart carefully and recognize, using context, where substitutions have occurred.    "

## 2024-07-24 DIAGNOSIS — Z48.89 AFTERCARE FOLLOWING SURGERY: Primary | ICD-10-CM

## 2024-08-07 ENCOUNTER — OFFICE VISIT (OUTPATIENT)
Dept: OBGYN CLINIC | Facility: HOSPITAL | Age: 79
End: 2024-08-07
Payer: MEDICARE

## 2024-08-07 ENCOUNTER — HOSPITAL ENCOUNTER (OUTPATIENT)
Dept: RADIOLOGY | Facility: HOSPITAL | Age: 79
Discharge: HOME | End: 2024-08-07
Attending: ORTHOPAEDIC SURGERY
Payer: MEDICARE

## 2024-08-07 VITALS
BODY MASS INDEX: 34.96 KG/M2 | HEART RATE: 99 BPM | HEIGHT: 73 IN | SYSTOLIC BLOOD PRESSURE: 112 MMHG | DIASTOLIC BLOOD PRESSURE: 70 MMHG

## 2024-08-07 DIAGNOSIS — R26.9 GAIT ABNORMALITY: ICD-10-CM

## 2024-08-07 DIAGNOSIS — Z98.890 S/P ORIF (OPEN REDUCTION INTERNAL FIXATION) FRACTURE: ICD-10-CM

## 2024-08-07 DIAGNOSIS — Z48.89 AFTERCARE FOLLOWING SURGERY: Primary | ICD-10-CM

## 2024-08-07 DIAGNOSIS — R29.898 WEAKNESS OF RIGHT LOWER EXTREMITY: ICD-10-CM

## 2024-08-07 DIAGNOSIS — M97.11XS PERIPROSTHETIC FRACTURE AROUND INTERNAL PROSTHETIC RIGHT KNEE JOINT, SEQUELA: ICD-10-CM

## 2024-08-07 DIAGNOSIS — M97.9XXA PERIPROSTHETIC FRACTURE OF KNEE: ICD-10-CM

## 2024-08-07 DIAGNOSIS — Z48.89 AFTERCARE FOLLOWING SURGERY: ICD-10-CM

## 2024-08-07 DIAGNOSIS — Z87.81 S/P ORIF (OPEN REDUCTION INTERNAL FIXATION) FRACTURE: ICD-10-CM

## 2024-08-07 PROCEDURE — 99213 OFFICE O/P EST LOW 20 MIN: CPT | Performed by: ORTHOPAEDIC SURGERY

## 2024-08-07 PROCEDURE — 73560 X-RAY EXAM OF KNEE 1 OR 2: CPT

## 2024-08-07 RX ORDER — AMIODARONE HYDROCHLORIDE 100 MG/1
TABLET ORAL
COMMUNITY
Start: 2024-07-17

## 2024-08-07 NOTE — PROGRESS NOTES
Assessment:  1. Aftercare following surgery        2. 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 S/P ORIF (open reduction internal fixation) fracture        4. Weakness of right lower extremity        5. Periprosthetic fracture of knee        6. Gait abnormality            Plan:   21 months s/p right periprosthetic ORIF, 11/3/2022  Radiograph displays known break in fixation plate over distal femur yet this plate displays no increased angulation compared to previous radiograph  Current symptoms of anteromedial right knee pain  We discussed that we can potentially use an intermedullary nail following removal of broken fixation plate.    The patient is currently doing well with no reduction in activity.  A plan to wait and watch while patient progresses in physical therapy was made.    Continue physical therapy  Follow up in 8 weeks    To do next visit:  No follow-ups on file.    The above stated was discussed in layman's terms and the patient expressed understanding.  All questions were answered to the patient's satisfaction.       Scribe Attestation      I,:   am acting as a scribe while in the presence of the attending physician.:       I,:   personally performed the services described in this documentation    as scribed in my presence.:               Subjective:   Osmin Plummer is a 78 y.o. male who presents 21 months s/p right periprosthetic ORIF, 11/3/2022.   He seems to have plateaued.  Today he complains of right anteromedial sharp knee pain with instability.  He continues to use walker.  He continues physical therapy 2x/week with benefit.        Review of systems negative unless otherwise specified in HPI    Past Medical History:   Diagnosis Date    Atrial fibrillation (HCC)        Past Surgical History:   Procedure Laterality Date    ORIF PERIPROSTHETIC KNEE Right 11/3/2022    Procedure: OPEN REDUCTION W/ INTERNAL FIXATION (ORIF) PERIPROSTHETIC KNEE;  Surgeon:  Umberto Irby MD;  Location: [facility];  Service: Orthopedics       No family history on file.    Social History     Occupational History    Not on file   Tobacco Use    Smoking status: Former    Smokeless tobacco: Former   Substance and Sexual Activity    Alcohol use: Not Currently    Drug use: Not on file    Sexual activity: Not on file         Current Outpatient Medications:     amiodarone 100 mg tablet, , Disp: , Rfl:     acetaminophen (TYLENOL) 325 mg tablet, Take 650 mg by mouth, Disp: , Rfl:     amiodarone 200 mg tablet, , Disp: , Rfl:     aspirin (ECOTRIN LOW STRENGTH) 81 mg EC tablet, Take 81 mg by mouth daily, Disp: , Rfl:     atorvastatin (LIPITOR) 40 mg tablet, Take 40 mg by mouth daily, Disp: , Rfl:     carboxymethylcellulose (REFRESH PLUS) 0.5 % SOLN, 1 drop, Disp: , Rfl:     cefdinir (OMNICEF) 300 mg capsule, , Disp: , Rfl:     cephalexin (KEFLEX) 500 mg capsule, take 1 capsule by mouth every 6 hours for 7 days, Disp: , Rfl:     Eliquis 5 MG, , Disp: , Rfl:     ezetimibe (ZETIA) 10 mg tablet, Take 10 mg by mouth daily, Disp: , Rfl:     Ferrous Sulfate 5 MG/20ML SOLN, Take by mouth (Patient not taking: Reported on 1/11/2024), Disp: , Rfl:     fluticasone (FLONASE) 50 mcg/act nasal spray, 1 spray, Disp: , Rfl:     lansoprazole (PREVACID) 30 mg capsule, , Disp: , Rfl:     metoprolol succinate (TOPROL-XL) 25 mg 24 hr tablet, , Disp: , Rfl:     metoprolol succinate (TOPROL-XL) 50 mg 24 hr tablet, , Disp: , Rfl:     Multiple Vitamin (multivitamin) tablet, Take 1 tablet by mouth daily, Disp: , Rfl:     mupirocin (BACTROBAN) 2 % ointment, APPLY TOPICALLY TO THE AFFECTED AREA TWICE DAILY UNTIL HEALED, Disp: , Rfl:     Myrbetriq 25 MG TB24, , Disp: , Rfl:     polyvinyl alcohol (LIQUIFILM TEARS) 1.4 % ophthalmic solution, Administer 1 drop to both eyes as needed for dry eyes, Disp: , Rfl:     Polyvinyl Alcohol-Povidone PF 1.4-0.6 % SOLN, once daily (Patient not taking: Reported on 3/19/2024), Disp: ,  "Rfl:     rOPINIRole (REQUIP) 1 mg tablet, , Disp: , Rfl:     sildenafil (VIAGRA) 100 mg tablet, TAKE 1 TABLET BY MOUTH 60 MINUTES BEFORE SEXUAL ACTIVITY, Disp: , Rfl:     sulfamethoxazole-trimethoprim (BACTRIM DS) 800-160 mg per tablet, , Disp: , Rfl:     Sutab 7874-078-195 MG TABS, , Disp: , Rfl:     tamsulosin (FLOMAX) 0.4 mg, 0.4 mg by oral route., Disp: , Rfl:     Vibegron (Gemtesa) 75 MG TABS, Take by mouth, Disp: , Rfl:     No Known Allergies         Vitals:    08/07/24 1308   BP: 112/70   Pulse: 99       Objective:  Physical exam  General: Awake, Alert, Oriented  Eyes: Pupils equal, round and reactive to light  Heart: regular rate and rhythm  Lungs: No audible wheezing  Abdomen: soft                    Ortho Exam  Right knee:  Patient uses walker for ambulation  Well healed anterior incision  No erythema and no ecchymosis  Stable to varus and valgus stress  Extensor mechanism intact  Extension near full  Flexion 110  Calf compartments soft and supple  Sensation intact  Toes are warm sensate and mobile    Diagnostics, reviewed and taken today if performed as documented:     The attending physician has personally reviewed the pertinent films in PACS and interpretation is as follows:  Right knee/femur x-ray:  s/p total knee arthroplasty.  Distal femur periprosthetic fracture remains grossly intact and stable with maturing callus formation.  The is break in distal fixation plate yet there is no increase of angulation since previous study.    Procedures, if performed today:    Procedures    None performed      Portions of the record may have been created with voice recognition software.  Occasional wrong word or \"sound a like\" substitutions may have occurred due to the inherent limitations of voice recognition software.  Read the chart carefully and recognize, using context, where substitutions have occurred.    "

## 2024-08-08 ENCOUNTER — TELEPHONE (OUTPATIENT)
Age: 79
End: 2024-08-08

## 2024-08-08 NOTE — TELEPHONE ENCOUNTER
Caller: Keyonna Spouse    Doctor/Office: Mahamed Kinney CB#: [phone]    What needs to be faxed: asking for an updated physical therapy order to reflect patient needs to continue for 8 weeks until his next appt. Please fax as they use a non sl pt    ATTN to: NA    Fax#: [phone]

## 2024-09-05 DIAGNOSIS — M97.9XXA PERIPROSTHETIC FRACTURE OF KNEE: Primary | ICD-10-CM

## 2024-09-30 ENCOUNTER — TELEPHONE (OUTPATIENT)
Age: 79
End: 2024-09-30

## 2024-09-30 NOTE — TELEPHONE ENCOUNTER
Caller: patients wife rosa     Doctor: kenny     Reason for call: PT is giving patient a hard time with the scripts, patients script that was put in on 9/5 is good for 6 weeks but PT is telling the patient that he needs a new script faxed to [phone] so he can have PT this Friday 10/4 or next Monday 10/7    Please advise     Call back#: [phone]

## 2024-09-30 NOTE — TELEPHONE ENCOUNTER
Caller: patients wife rosa     Doctor: kenny     Reason for call: disregard message about PT script, PT did not take a look at the new script     Call back#: [phone]

## 2024-10-08 ENCOUNTER — HOSPITAL ENCOUNTER (OUTPATIENT)
Dept: RADIOLOGY | Facility: HOSPITAL | Age: 79
Discharge: HOME | End: 2024-10-08
Attending: ORTHOPAEDIC SURGERY
Payer: MEDICARE

## 2024-10-08 ENCOUNTER — OFFICE VISIT (OUTPATIENT)
Dept: OBGYN CLINIC | Facility: HOSPITAL | Age: 79
End: 2024-10-08
Payer: MEDICARE

## 2024-10-08 VITALS
HEIGHT: 73 IN | DIASTOLIC BLOOD PRESSURE: 52 MMHG | BODY MASS INDEX: 34.96 KG/M2 | HEART RATE: 106 BPM | SYSTOLIC BLOOD PRESSURE: 92 MMHG

## 2024-10-08 DIAGNOSIS — Z48.89 AFTERCARE FOLLOWING SURGERY: Primary | ICD-10-CM

## 2024-10-08 DIAGNOSIS — Z98.890 S/P ORIF (OPEN REDUCTION INTERNAL FIXATION) FRACTURE: ICD-10-CM

## 2024-10-08 DIAGNOSIS — Z48.89 AFTERCARE FOLLOWING SURGERY: ICD-10-CM

## 2024-10-08 DIAGNOSIS — R29.898 WEAKNESS OF RIGHT LOWER EXTREMITY: ICD-10-CM

## 2024-10-08 DIAGNOSIS — Z87.81 S/P ORIF (OPEN REDUCTION INTERNAL FIXATION) FRACTURE: ICD-10-CM

## 2024-10-08 DIAGNOSIS — M97.11XS PERIPROSTHETIC FRACTURE AROUND INTERNAL PROSTHETIC RIGHT KNEE JOINT, SEQUELA: ICD-10-CM

## 2024-10-08 PROCEDURE — 73560 X-RAY EXAM OF KNEE 1 OR 2: CPT

## 2024-10-08 PROCEDURE — 99213 OFFICE O/P EST LOW 20 MIN: CPT | Performed by: ORTHOPAEDIC SURGERY

## 2024-10-08 NOTE — PROGRESS NOTES
Assessment:   Diagnosis ICD-10-CM Associated Orders   1. Aftercare following surgery  Z48.89 XR knee 1 or 2 vw right     Ambulatory Referral to Physical Therapy      2. 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 S/P ORIF (open reduction internal fixation) fracture  Z98.890 Ambulatory Referral to Physical Therapy    Z87.81       4. Weakness of right lower extremity  R29.898 Ambulatory Referral to Physical Therapy          Plan:  78 y.o. male 23 months months s/p right femur ORIF   Radiograph displays known break in fixation plate over distal femur yet this plate displays no increased angulation compared to previous radiograph   We discussed that we can potentially use an intermedullary nail following removal of broken fixation plate - if continues to have issues at next visit we agreed to schedule surgery  Continue with physical therapy; new script created today   Follow up in 3 months      The above stated was discussed in layman's terms and the patient expressed understanding.  All questions were answered to the patient's satisfaction.     To do next visit:  Return in about 3 months (around 1/8/2025) for right knee .      Subjective:   Osmin Plummer is a 78 y.o. male who presents 23 months s/p ORIF right femur after neo-prosthetic fracture.  He admits to slight improvements since last visit in regards to function and mobility.  He continues to attend physical therapy which continues to help.  Patient ambulating with walker for stability mostly.  He admits to continued pain and discomfort with ambulation but has improved slightly.        Patient and his wife expressed interest in moving forward with surgical intervention however with state of current x-rays, patient was interested in hold off another 3 months to see if improvements can be made.     Review of systems negative unless otherwise specified in HPI    Past Medical  History:   Diagnosis Date    Atrial fibrillation (HCC)        Past Surgical History:   Procedure Laterality Date    ORIF PERIPROSTHETIC KNEE Right 11/3/2022    Procedure: OPEN REDUCTION W/ INTERNAL FIXATION (ORIF) PERIPROSTHETIC KNEE;  Surgeon: Umberto Irby MD;  Location: [facility];  Service: Orthopedics       No family history on file.    Social History     Occupational History    Not on file   Tobacco Use    Smoking status: Former    Smokeless tobacco: Former   Substance and Sexual Activity    Alcohol use: Not Currently    Drug use: Not on file    Sexual activity: Not on file         Current Outpatient Medications:     acetaminophen (TYLENOL) 325 mg tablet, Take 650 mg by mouth, Disp: , Rfl:     amiodarone 100 mg tablet, , Disp: , Rfl:     amiodarone 200 mg tablet, , Disp: , Rfl:     aspirin (ECOTRIN LOW STRENGTH) 81 mg EC tablet, Take 81 mg by mouth daily, Disp: , Rfl:     atorvastatin (LIPITOR) 40 mg tablet, Take 40 mg by mouth daily, Disp: , Rfl:     carboxymethylcellulose (REFRESH PLUS) 0.5 % SOLN, 1 drop, Disp: , Rfl:     cefdinir (OMNICEF) 300 mg capsule, , Disp: , Rfl:     cephalexin (KEFLEX) 500 mg capsule, take 1 capsule by mouth every 6 hours for 7 days, Disp: , Rfl:     Eliquis 5 MG, , Disp: , Rfl:     ezetimibe (ZETIA) 10 mg tablet, Take 10 mg by mouth daily, Disp: , Rfl:     Ferrous Sulfate 5 MG/20ML SOLN, Take by mouth (Patient not taking: Reported on 1/11/2024), Disp: , Rfl:     fluticasone (FLONASE) 50 mcg/act nasal spray, 1 spray, Disp: , Rfl:     lansoprazole (PREVACID) 30 mg capsule, , Disp: , Rfl:     metoprolol succinate (TOPROL-XL) 25 mg 24 hr tablet, , Disp: , Rfl:     metoprolol succinate (TOPROL-XL) 50 mg 24 hr tablet, , Disp: , Rfl:     Multiple Vitamin (multivitamin) tablet, Take 1 tablet by mouth daily, Disp: , Rfl:     mupirocin (BACTROBAN) 2 % ointment, APPLY TOPICALLY TO THE AFFECTED AREA TWICE DAILY UNTIL HEALED, Disp: , Rfl:     Myrbetriq 25 MG TB24, , Disp: , Rfl:      "polyvinyl alcohol (LIQUIFILM TEARS) 1.4 % ophthalmic solution, Administer 1 drop to both eyes as needed for dry eyes, Disp: , Rfl:     Polyvinyl Alcohol-Povidone PF 1.4-0.6 % SOLN, once daily (Patient not taking: Reported on 3/19/2024), Disp: , Rfl:     rOPINIRole (REQUIP) 1 mg tablet, , Disp: , Rfl:     sildenafil (VIAGRA) 100 mg tablet, TAKE 1 TABLET BY MOUTH 60 MINUTES BEFORE SEXUAL ACTIVITY, Disp: , Rfl:     sulfamethoxazole-trimethoprim (BACTRIM DS) 800-160 mg per tablet, , Disp: , Rfl:     Sutab 3082-659-970 MG TABS, , Disp: , Rfl:     tamsulosin (FLOMAX) 0.4 mg, 0.4 mg by oral route., Disp: , Rfl:     Vibegron (Gemtesa) 75 MG TABS, Take by mouth, Disp: , Rfl:     No Known Allergies         Vitals:    10/08/24 1444   BP: 92/52   Pulse: (!) 106       Objective:  Physical exam  General: Awake, Alert, Oriented  Eyes: Pupils equal, round and reactive to light  Heart: regular rate and rhythm  Lungs: No audible wheezing  Abdomen: soft                    Ortho Exam  Right knee:  Patient uses walker for ambulation  Well healed anterior incision  No erythema and no ecchymosis  Stable to varus and valgus stress  Extensor mechanism intact  Extension near full  Flexion 110  Calf compartments soft and supple  Sensation intact  Toes are warm sensate and mobile    Diagnostics, reviewed and taken today if performed as documented:    Right knee/femur x-ray:  s/p total knee arthroplasty.  Distal femur periprosthetic fracture remains grossly stable with maturing callus formation, especially posteriorly  Break in distal fixation plate without increase of angulation since previous study.       Procedures, if performed today:    None performed      Portions of the record may have been created with voice recognition software.  Occasional wrong word or \"sound a like\" substitutions may have occurred due to the inherent limitations of voice recognition software.  Read the chart carefully and recognize, using context, where " substitutions have occurred.

## 2024-11-25 ENCOUNTER — TELEPHONE (OUTPATIENT)
Age: 79
End: 2024-11-25

## 2024-11-25 DIAGNOSIS — M97.9XXA PERIPROSTHETIC FRACTURE OF KNEE: Primary | ICD-10-CM

## 2024-11-25 NOTE — TELEPHONE ENCOUNTER
Caller: Spouse/Keyonna    Doctor: Mahamed    Reason for call: PT is requesting a new script. Medicare is requiring a script that gives the length as 8 weeks which will bring the patient to his next appt in January. Fax to [facility] fax#[phone]    Call back#: [phone]

## 2025-01-02 DIAGNOSIS — Z48.89 AFTERCARE FOLLOWING SURGERY: Primary | ICD-10-CM

## 2025-01-28 ENCOUNTER — HOSPITAL ENCOUNTER (OUTPATIENT)
Dept: RADIOLOGY | Facility: HOSPITAL | Age: 80
Discharge: HOME | End: 2025-01-28
Attending: ORTHOPAEDIC SURGERY
Payer: MEDICARE

## 2025-01-28 ENCOUNTER — OFFICE VISIT (OUTPATIENT)
Dept: OBGYN CLINIC | Facility: HOSPITAL | Age: 80
End: 2025-01-28
Payer: MEDICARE

## 2025-01-28 VITALS — BODY MASS INDEX: 34.96 KG/M2 | HEIGHT: 73 IN

## 2025-01-28 DIAGNOSIS — M97.9XXA PERIPROSTHETIC FRACTURE OF KNEE: ICD-10-CM

## 2025-01-28 DIAGNOSIS — M97.11XS PERIPROSTHETIC FRACTURE AROUND INTERNAL PROSTHETIC RIGHT KNEE JOINT, SEQUELA: ICD-10-CM

## 2025-01-28 DIAGNOSIS — Z87.81 S/P ORIF (OPEN REDUCTION INTERNAL FIXATION) FRACTURE: ICD-10-CM

## 2025-01-28 DIAGNOSIS — Z48.89 AFTERCARE FOLLOWING SURGERY: ICD-10-CM

## 2025-01-28 DIAGNOSIS — Z48.89 AFTERCARE FOLLOWING SURGERY: Primary | ICD-10-CM

## 2025-01-28 DIAGNOSIS — Z98.890 S/P ORIF (OPEN REDUCTION INTERNAL FIXATION) FRACTURE: ICD-10-CM

## 2025-01-28 DIAGNOSIS — Z51.81 ANTICOAGULATION MANAGEMENT ENCOUNTER: ICD-10-CM

## 2025-01-28 DIAGNOSIS — Z79.01 ANTICOAGULATION MANAGEMENT ENCOUNTER: ICD-10-CM

## 2025-01-28 DIAGNOSIS — R29.898 WEAKNESS OF RIGHT LOWER EXTREMITY: ICD-10-CM

## 2025-01-28 DIAGNOSIS — R26.9 GAIT ABNORMALITY: ICD-10-CM

## 2025-01-28 PROCEDURE — 73560 X-RAY EXAM OF KNEE 1 OR 2: CPT

## 2025-01-28 PROCEDURE — 99214 OFFICE O/P EST MOD 30 MIN: CPT | Performed by: ORTHOPAEDIC SURGERY

## 2025-01-28 RX ORDER — FERROUS SULFATE 324(65)MG
324 TABLET, DELAYED RELEASE (ENTERIC COATED) ORAL
Qty: 60 TABLET | Refills: 0 | Status: SHIPPED | OUTPATIENT
Start: 2025-01-28

## 2025-01-28 RX ORDER — MUPIROCIN 20 MG/G
OINTMENT TOPICAL 2 TIMES DAILY
Qty: 15 G | Refills: 0 | Status: SHIPPED | OUTPATIENT
Start: 2025-01-28

## 2025-01-28 RX ORDER — ENOXAPARIN SODIUM 100 MG/ML
40 INJECTION SUBCUTANEOUS DAILY
Qty: 11.2 ML | Refills: 0 | Status: SHIPPED | OUTPATIENT
Start: 2025-01-28 | End: 2025-02-25

## 2025-01-28 RX ORDER — SODIUM CHLORIDE, SODIUM LACTATE, POTASSIUM CHLORIDE, CALCIUM CHLORIDE 600; 310; 30; 20 MG/100ML; MG/100ML; MG/100ML; MG/100ML
125 INJECTION, SOLUTION INTRAVENOUS CONTINUOUS
OUTPATIENT
Start: 2025-01-28

## 2025-01-28 RX ORDER — TRANEXAMIC ACID 10 MG/ML
1000 INJECTION, SOLUTION INTRAVENOUS ONCE
OUTPATIENT
Start: 2025-01-28 | End: 2025-01-28

## 2025-01-28 RX ORDER — FOLIC ACID 1 MG/1
1 TABLET ORAL DAILY
Qty: 30 TABLET | Refills: 0 | Status: SHIPPED | OUTPATIENT
Start: 2025-01-28

## 2025-01-28 RX ORDER — CHLORHEXIDINE GLUCONATE ORAL RINSE 1.2 MG/ML
15 SOLUTION DENTAL ONCE
OUTPATIENT
Start: 2025-01-28 | End: 2025-01-28

## 2025-01-28 RX ORDER — ASCORBIC ACID 500 MG
500 TABLET ORAL DAILY
Qty: 30 TABLET | Refills: 0 | Status: SHIPPED | OUTPATIENT
Start: 2025-01-28

## 2025-01-28 NOTE — PROGRESS NOTES
Assessment:  1. Aftercare following surgery  Ambulatory referral to Physical Therapy      2. 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 S/P ORIF (open reduction internal fixation) fracture  Ambulatory referral to Physical Therapy      4. Weakness of right lower extremity  Ambulatory referral to Physical Therapy      5. Periprosthetic fracture of knee  Ambulatory referral to Physical Therapy      6. Gait abnormality  Ambulatory referral to Physical Therapy          Plan:  2+ years s/p months s/p right femur ORIF, 11/3/2022.  The patient continues to suffer from right distal femur and right knee pain with instability   Radiograph display stable fracture site with no additional fracture angulation  The patient will schedule right total knee arthroplasty with distal femoral replacement  Risk of the surgery are inclusive of but not limited to bleeding, infection, nerve injury, blood clot, worsening of symptoms, not achieving the anticipated results, persistent stiffness, weakness and the need for additional surgery. The patient verbally stated they understood those risks and would like to proceed with the surgery.  Follow up post-op    To do next visit:  Return for post-op with x-rays.    The above stated was discussed in layman's terms and the patient expressed understanding.  All questions were answered to the patient's satisfaction.       Scribe Attestation      I,:  Angel Saez MA am acting as a scribe while in the presence of the attending physician.:       I,:  Umberto Irby MD personally performed the services described in this documentation    as scribed in my presence.:               Subjective:   Osmin Plummer is a 79 y.o. male who presents 2+ years s/p months s/p right femur ORIF, 11/3/2022.  He is doing well.  Today he complains of right anteromedial and generalized knee pain with right lateral thigh and shin numbness  with occasional instability.  He continues physical therapy with some progress.  He has used Tylenol for pain.  He does use SPC.       He also complains of onset of left generalized knee pain.        Review of systems negative unless otherwise specified in HPI    Past Medical History:   Diagnosis Date    Atrial fibrillation (HCC)        Past Surgical History:   Procedure Laterality Date    ORIF PERIPROSTHETIC KNEE Right 11/3/2022    Procedure: OPEN REDUCTION W/ INTERNAL FIXATION (ORIF) PERIPROSTHETIC KNEE;  Surgeon: Umberto Irby MD;  Location: [facility];  Service: Orthopedics       No family history on file.    Social History     Occupational History    Not on file   Tobacco Use    Smoking status: Former    Smokeless tobacco: Former   Substance and Sexual Activity    Alcohol use: Not Currently    Drug use: Not on file    Sexual activity: Not on file         Current Outpatient Medications:     acetaminophen (TYLENOL) 325 mg tablet, Take 650 mg by mouth, Disp: , Rfl:     amiodarone 100 mg tablet, , Disp: , Rfl:     amiodarone 200 mg tablet, , Disp: , Rfl:     aspirin (ECOTRIN LOW STRENGTH) 81 mg EC tablet, Take 81 mg by mouth daily, Disp: , Rfl:     atorvastatin (LIPITOR) 40 mg tablet, Take 40 mg by mouth daily, Disp: , Rfl:     carboxymethylcellulose (REFRESH PLUS) 0.5 % SOLN, 1 drop, Disp: , Rfl:     cefdinir (OMNICEF) 300 mg capsule, , Disp: , Rfl:     cephalexin (KEFLEX) 500 mg capsule, take 1 capsule by mouth every 6 hours for 7 days, Disp: , Rfl:     Eliquis 5 MG, , Disp: , Rfl:     ezetimibe (ZETIA) 10 mg tablet, Take 10 mg by mouth daily, Disp: , Rfl:     Ferrous Sulfate 5 MG/20ML SOLN, Take by mouth (Patient not taking: Reported on 1/11/2024), Disp: , Rfl:     fluticasone (FLONASE) 50 mcg/act nasal spray, 1 spray, Disp: , Rfl:     lansoprazole (PREVACID) 30 mg capsule, , Disp: , Rfl:     metoprolol succinate (TOPROL-XL) 25 mg 24 hr tablet, , Disp: , Rfl:     metoprolol succinate (TOPROL-XL) 50 mg  24 hr tablet, , Disp: , Rfl:     Multiple Vitamin (multivitamin) tablet, Take 1 tablet by mouth daily, Disp: , Rfl:     mupirocin (BACTROBAN) 2 % ointment, APPLY TOPICALLY TO THE AFFECTED AREA TWICE DAILY UNTIL HEALED, Disp: , Rfl:     Myrbetriq 25 MG TB24, , Disp: , Rfl:     polyvinyl alcohol (LIQUIFILM TEARS) 1.4 % ophthalmic solution, Administer 1 drop to both eyes as needed for dry eyes, Disp: , Rfl:     Polyvinyl Alcohol-Povidone PF 1.4-0.6 % SOLN, once daily (Patient not taking: Reported on 3/19/2024), Disp: , Rfl:     rOPINIRole (REQUIP) 1 mg tablet, , Disp: , Rfl:     sildenafil (VIAGRA) 100 mg tablet, TAKE 1 TABLET BY MOUTH 60 MINUTES BEFORE SEXUAL ACTIVITY, Disp: , Rfl:     sulfamethoxazole-trimethoprim (BACTRIM DS) 800-160 mg per tablet, , Disp: , Rfl:     Sutab 4344-897-453 MG TABS, , Disp: , Rfl:     tacrolimus (PROTOPIC) 0.1 % ointment, Apply to rash on buttocks twice daily as needed., Disp: , Rfl:     tamsulosin (FLOMAX) 0.4 mg, 0.4 mg by oral route., Disp: , Rfl:     Vibegron (Gemtesa) 75 MG TABS, Take by mouth, Disp: , Rfl:     No Known Allergies       There were no vitals filed for this visit.    Objective:  Physical exam  General: Awake, Alert, Oriented  Eyes: Pupils equal, round and reactive to light  Heart: regular rate and rhythm  Lungs: No audible wheezing  Abdomen: soft                    Ortho Exam  Right knee:  Patient uses walker for ambulation  Well healed anterior incision  No erythema and no ecchymosis  Stable to varus and valgus stress  Extensor mechanism intact  Extension near full  Flexion 110  Calf compartments soft and supple  Sensation intact  Toes are warm sensate and mobile     Diagnostics, reviewed and taken today if performed as documented:    The attending physician has personally reviewed the pertinent films in PACS and interpretation is as follows:  Right knee x-rays:  Stable distal femur fracture with increased consolidation of posterior bridging bone.  No change in  "surgical plate and screw positions.      Procedures, if performed today:    Procedures    None performed      Portions of the record may have been created with voice recognition software.  Occasional wrong word or \"sound a like\" substitutions may have occurred due to the inherent limitations of voice recognition software.  Read the chart carefully and recognize, using context, where substitutions have occurred.    "

## 2025-03-06 ENCOUNTER — TELEPHONE (OUTPATIENT)
Dept: OBGYN CLINIC | Facility: HOSPITAL | Age: 80
End: 2025-03-06

## 2025-03-06 DIAGNOSIS — M97.9XXA PERIPROSTHETIC FRACTURE OF KNEE: Primary | ICD-10-CM

## 2025-03-06 NOTE — TELEPHONE ENCOUNTER
Surgery with Dr. Irby on 6/4 has been rescheduled to 7/2 due to Dr. Irby being out of office. Pt agreeable to new date.

## 2025-04-07 ENCOUNTER — TELEPHONE (OUTPATIENT)
Age: 80
End: 2025-04-07

## 2025-04-07 DIAGNOSIS — M97.9XXA PERIPROSTHETIC FRACTURE OF KNEE: Primary | ICD-10-CM

## 2025-04-07 NOTE — TELEPHONE ENCOUNTER
Caller: Ramon patients spouse    Doctor: Dr. Irby    Reason for call: Requesting a renewal for PT // the script must state 8-10 weeks so the insurance will cover it // Spouse states the next surgery R TKA & removal of hardware is scheduled for 07/02/2025// Please advise and thank you    Patient receives PT at [facility] fax: [phone]    Call back#: [phone]

## 2025-04-11 NOTE — TELEPHONE ENCOUNTER
Patient's wife called the RX Refill Line. Message is being forwarded to the office.     Patient's wife is requesting to have his script for PT faxed to [facility]. Stated he's gone twice this week and both times, he was told they never got the order from the office    She's hoping this can be sent today so it does not cause issues for next time he goes. She said the script needs to say good for 8-10 weeks as he is supposed to do PT until surgery which is on 7/2, otherwise Medicare will only cover 4 visits and she'll have to call back     Please contact Keyonna at , would like a call back once it's been faxed

## 2025-04-24 ENCOUNTER — TELEPHONE (OUTPATIENT)
Dept: OBGYN CLINIC | Facility: HOSPITAL | Age: 80
End: 2025-04-24

## 2025-04-24 NOTE — TELEPHONE ENCOUNTER
Patient's surgery on 7/2 with Dr. Irby has been rescheduled to 8/20 due to Dr. Irby being OOO. 8/13 was the next available, but pt was unable to do that date. Pt needs to be at the [facility] due to his cardiac history. We rescheduled his two post-op appointments. Pt's wife has no questions at this time.

## 2025-05-29 ENCOUNTER — OFFICE VISIT (OUTPATIENT)
Dept: OBGYN CLINIC | Facility: HOSPITAL | Age: 80
End: 2025-05-29
Payer: MEDICARE

## 2025-05-29 VITALS — BODY MASS INDEX: 35.65 KG/M2 | WEIGHT: 269 LBS | HEIGHT: 73 IN

## 2025-05-29 DIAGNOSIS — Z96.651 HISTORY OF KNEE REPLACEMENT PROCEDURE OF RIGHT KNEE: Primary | ICD-10-CM

## 2025-05-29 DIAGNOSIS — S72.491K: ICD-10-CM

## 2025-05-29 DIAGNOSIS — M97.11XS PERIPROSTHETIC FRACTURE AROUND INTERNAL PROSTHETIC RIGHT KNEE JOINT, SEQUELA: Primary | ICD-10-CM

## 2025-05-29 PROBLEM — S72.499K: Status: ACTIVE | Noted: 2025-05-29

## 2025-05-29 PROCEDURE — 99213 OFFICE O/P EST LOW 20 MIN: CPT | Performed by: ORTHOPAEDIC SURGERY

## 2025-05-29 RX ORDER — ALUMINUM HYDROXIDE AND MAGNESIUM CARBONATE 254; 237.5 MG/5ML; MG/5ML
LIQUID ORAL
COMMUNITY

## 2025-05-29 RX ORDER — AMMONIUM LACTATE 12 G/100G
CREAM TOPICAL
COMMUNITY
Start: 2025-04-10

## 2025-05-29 RX ORDER — COVID-19 MOLECULAR TEST ASSAY
KIT MISCELLANEOUS
COMMUNITY
Start: 2025-02-27

## 2025-05-29 RX ORDER — MOLNUPIRAVIR 200 MG/1
CAPSULE ORAL
COMMUNITY
Start: 2025-03-04

## 2025-05-29 RX ORDER — BENZONATATE 200 MG/1
CAPSULE ORAL
COMMUNITY
Start: 2025-03-03

## 2025-05-29 NOTE — PROGRESS NOTES
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  All diagnoses were discussed with the patient.  Treatment option including risk, benefits, return discussed in detail.  I think hardware removal and revision knee replacement in the form of distal femoral replacement is indicated.  After review the risks and benefits, consent was confirmed for the above-mentioned surgery.  No guarantees given.  Office follow-up as a postoperative patient is my final recommendation in the interim for added support and allowing more successful ambulation, a hinged knee brace was applied unlocked to allow motion            To do next visit:  No follow-ups on file.    _____________________________________________________  CHIEF COMPLAINT:  Chief Complaint   Patient presents with    Right Knee - Follow-up         SUBJECTIVE:  Osmin Plummer is a 79 y.o. male who presents for reevaluation.  He continues describe weightbearing pain in the right distal thigh above his knee replacement.  He is over a year removed from repair of a fracture of the right distal femur with a plate and screw.  He has fracture of the fracture fixation plate and nonunion.        PAST MEDICAL HISTORY:  Past Medical History[1]    PAST SURGICAL HISTORY:  Past Surgical History[2]    FAMILY HISTORY:  Family History[3]    SOCIAL HISTORY:  Social History[4]    MEDICATIONS:  Current Medications[5]    ALLERGIES:  Allergies[6]    LABS:  HgA1c: No results found for: \"HGBA1C\"  BMP:   Lab Results " "  Component Value Date    CALCIUM 8.1 (L) 11/06/2022    K 4.0 11/06/2022    CO2 34 (H) 11/06/2022     11/06/2022    BUN 13 11/06/2022    CREATININE 0.61 11/06/2022     CBC: No components found for: \"CBC\"    _____________________________________________________  PHYSICAL EXAMINATION:  Vital signs: Ht 6' 1\" (1.854 m)   Wt 122 kg (269 lb)   BMI 35.49 kg/m²   General: No acute distress, awake and alert  Psychiatric: Mood and affect appear appropriate  HEENT: Trachea Midline, No torticollis, no apparent facial trauma  Cardiovascular: No audible murmurs; Extremities appear perfused  Pulmonary: No audible wheezing or stridor  Skin: No open lesions; see further details (if any) below    MUSCULOSKELETAL EXAMINATION:  Ortho Exam  Right hip moves well.  Right thigh has some atrophy.  Right knee is healed anterior scar.  The knee is neutral to alignment.  There is recreatable pain and false motion with varus valgus stress of the right knee when it is extended.  The foot and toes strongly dorsiflex    ___________________________________________________  STUDIES REVIEWED:  I personally reviewed the images obtained in office today and my independent interpretation is as follows:    None performed      PROCEDURES PERFORMED:    Procedures    None preformed       Umberto Irby MD         [1]   Past Medical History:  Diagnosis Date    Atrial fibrillation (HCC)    [2]   Past Surgical History:  Procedure Laterality Date    ORIF PERIPROSTHETIC KNEE Right 11/3/2022    Procedure: OPEN REDUCTION W/ INTERNAL FIXATION (ORIF) PERIPROSTHETIC KNEE;  Surgeon: Umberto Irby MD;  Location: [facility];  Service: Orthopedics   [3] No family history on file.  [4]   Social History  Tobacco Use    Smoking status: Former    Smokeless tobacco: Former   Substance Use Topics    Alcohol use: Not Currently   [5]   Current Outpatient Medications:     ammonium lactate (LAC-HYDRIN) 12 % cream, APPLY TOPICALLY TO THE AFFECTED AREA DAILY " FOR DRY SKIN, Disp: , Rfl:     benzonatate (TESSALON) 200 MG capsule, , Disp: , Rfl:     BinaxNOW COVID-19 Ag Home Test KIT, TEST AS DIRECTED TODAY, Disp: , Rfl:     Lagevrio 200 MG capsule, , Disp: , Rfl:     acetaminophen (TYLENOL) 325 mg tablet, Take 650 mg by mouth, Disp: , Rfl:     Alum Hydroxide-Mag Carbonate (Gaviscon Extra Strength) 508-475 MG/10ML SUSP, Take by mouth, Disp: , Rfl:     amiodarone 100 mg tablet, , Disp: , Rfl:     amiodarone 200 mg tablet, , Disp: , Rfl:     ascorbic acid (VITAMIN C) 500 MG tablet, Take 1 tablet (500 mg total) by mouth daily Start 30 days prior to surgery, Disp: 30 tablet, Rfl: 0    aspirin (ECOTRIN LOW STRENGTH) 81 mg EC tablet, Take 81 mg by mouth daily, Disp: , Rfl:     atorvastatin (LIPITOR) 40 mg tablet, Take 40 mg by mouth daily, Disp: , Rfl:     carboxymethylcellulose (REFRESH PLUS) 0.5 % SOLN, 1 drop, Disp: , Rfl:     cefdinir (OMNICEF) 300 mg capsule, , Disp: , Rfl:     cephalexin (KEFLEX) 500 mg capsule, take 1 capsule by mouth every 6 hours for 7 days, Disp: , Rfl:     Eliquis 5 MG, , Disp: , Rfl:     enoxaparin (LOVENOX) 40 mg/0.4 mL, Inject 0.4 mL (40 mg total) under the skin daily for 28 days Start injections after surgery, Disp: 11.2 mL, Rfl: 0    ezetimibe (ZETIA) 10 mg tablet, Take 10 mg by mouth daily, Disp: , Rfl:     ferrous sulfate 324 (65 Fe) mg, Take 1 tablet (324 mg total) by mouth daily before breakfast Start 30 days prior to surgery, Disp: 60 tablet, Rfl: 0    Ferrous Sulfate 5 MG/20ML SOLN, Take by mouth (Patient not taking: Reported on 1/11/2024), Disp: , Rfl:     fluticasone (FLONASE) 50 mcg/act nasal spray, 1 spray, Disp: , Rfl:     folic acid (FOLVITE) 1 mg tablet, Take 1 tablet (1 mg total) by mouth daily Start 30 days prior to surgery, Disp: 30 tablet, Rfl: 0    lansoprazole (PREVACID) 30 mg capsule, , Disp: , Rfl:     metoprolol succinate (TOPROL-XL) 25 mg 24 hr tablet, , Disp: , Rfl:     metoprolol succinate (TOPROL-XL) 50 mg 24 hr tablet, ,  Disp: , Rfl:     Multiple Vitamin (multivitamin) tablet, Take 1 tablet by mouth daily, Disp: , Rfl:     mupirocin (BACTROBAN) 2 % ointment, APPLY TOPICALLY TO THE AFFECTED AREA TWICE DAILY UNTIL HEALED, Disp: , Rfl:     mupirocin (BACTROBAN) 2 % ointment, Apply topically 2 (two) times a day Apply pea size amount to each nostril 2x/day for 5 days prior to procedure, Disp: 15 g, Rfl: 0    Myrbetriq 25 MG TB24, , Disp: , Rfl:     polyvinyl alcohol (LIQUIFILM TEARS) 1.4 % ophthalmic solution, Administer 1 drop to both eyes as needed for dry eyes, Disp: , Rfl:     Polyvinyl Alcohol-Povidone PF 1.4-0.6 % SOLN, once daily (Patient not taking: Reported on 3/19/2024), Disp: , Rfl:     rOPINIRole (REQUIP) 1 mg tablet, , Disp: , Rfl:     sildenafil (VIAGRA) 100 mg tablet, TAKE 1 TABLET BY MOUTH 60 MINUTES BEFORE SEXUAL ACTIVITY, Disp: , Rfl:     sulfamethoxazole-trimethoprim (BACTRIM DS) 800-160 mg per tablet, , Disp: , Rfl:     Sutab 9776-480-097 MG TABS, , Disp: , Rfl:     tacrolimus (PROTOPIC) 0.1 % ointment, Apply to rash on buttocks twice daily as needed., Disp: , Rfl:     tamsulosin (FLOMAX) 0.4 mg, 0.4 mg by oral route., Disp: , Rfl:     Vibegron (Gemtesa) 75 MG TABS, Take by mouth, Disp: , Rfl:   [6] No Known Allergies

## 2025-05-29 NOTE — ASSESSMENT & PLAN NOTE
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  All diagnoses were discussed with the patient.  Treatment option including risk, benefits, return discussed in detail.  I think hardware removal and revision knee replacement in the form of distal femoral replacement is indicated.  After review the risks and benefits, consent was confirmed for the above-mentioned surgery.  No guarantees given.  Office follow-up as a postoperative patient is my final recommendation in the interim for added support and allowing more successful ambulation, a hinged knee brace was applied unlocked to allow motion        93

## 2025-06-12 ENCOUNTER — TELEPHONE (OUTPATIENT)
Dept: OBGYN CLINIC | Facility: HOSPITAL | Age: 80
End: 2025-06-12

## 2025-06-12 NOTE — TELEPHONE ENCOUNTER
TONY to schedule a telephone call with the neo-op team. Explained he is still okay to see his PCP for clearance since he lives far away, but we need to do the phone call appointment as well.

## 2025-07-14 ENCOUNTER — TELEPHONE (OUTPATIENT)
Dept: ANESTHESIOLOGY | Facility: CLINIC | Age: 80
End: 2025-07-14

## 2025-07-15 ENCOUNTER — TELEPHONE (OUTPATIENT)
Dept: OBGYN CLINIC | Facility: HOSPITAL | Age: 80
End: 2025-07-15

## 2025-07-23 ENCOUNTER — TELEPHONE (OUTPATIENT)
Dept: ANESTHESIOLOGY | Facility: CLINIC | Age: 80
End: 2025-07-23

## 2025-07-29 ENCOUNTER — TELEPHONE (OUTPATIENT)
Age: 80
End: 2025-07-29

## 2025-07-29 DIAGNOSIS — R29.898 WEAKNESS OF RIGHT LOWER EXTREMITY: ICD-10-CM

## 2025-07-29 DIAGNOSIS — M97.11XS PERIPROSTHETIC FRACTURE AROUND INTERNAL PROSTHETIC RIGHT KNEE JOINT, SEQUELA: Primary | ICD-10-CM

## 2025-07-29 DIAGNOSIS — Z96.651 HISTORY OF KNEE REPLACEMENT PROCEDURE OF RIGHT KNEE: ICD-10-CM

## 2025-07-29 DIAGNOSIS — R26.9 GAIT ABNORMALITY: ICD-10-CM

## 2025-08-04 ENCOUNTER — TELEPHONE (OUTPATIENT)
Age: 80
End: 2025-08-04

## 2025-08-08 ENCOUNTER — TELEPHONE (OUTPATIENT)
Age: 80
End: 2025-08-08

## 2025-08-13 ENCOUNTER — TELEPHONE (OUTPATIENT)
Age: 80
End: 2025-08-13

## (undated) DEVICE — INTENDED FOR TISSUE SEPARATION, AND OTHER PROCEDURES THAT REQUIRE A SHARP SURGICAL BLADE TO PUNCTURE OR CUT.: Brand: BARD-PARKER SAFETY BLADES SIZE 10, STERILE

## (undated) DEVICE — PADDING CAST 4 IN  COTTON STRL

## (undated) DEVICE — DRAPE SHEET THREE QUARTER

## (undated) DEVICE — GLOVE SRG BIOGEL 8

## (undated) DEVICE — GAUZE SPONGES,16 PLY: Brand: CURITY

## (undated) DEVICE — DRAPE C-ARM X-RAY

## (undated) DEVICE — JP 3-SPRING RES W/10FR PVC DRAIN/TR: Brand: CARDINAL HEALTH

## (undated) DEVICE — STOCKINETTE REGULAR

## (undated) DEVICE — 3.2MM DRILL BIT/QC/145MM

## (undated) DEVICE — PACK MAJOR ORTHO W/SPLITS PBDS

## (undated) DEVICE — CHLORAPREP HI-LITE 26ML ORANGE

## (undated) DEVICE — ABDOMINAL PAD: Brand: DERMACEA

## (undated) DEVICE — 4.5MM CORTEX SCREW SELF-TAPPING 76MM
Type: IMPLANTABLE DEVICE | Site: KNEE | Status: NON-FUNCTIONAL
Removed: 2022-11-03

## (undated) DEVICE — PLUMEPEN PRO 10FT

## (undated) DEVICE — KERLIX BANDAGE ROLL: Brand: KERLIX

## (undated) DEVICE — ACE WRAP 6 IN STERILE

## (undated) DEVICE — COOL TEMP PAD

## (undated) DEVICE — GLOVE INDICATOR PI UNDERGLOVE SZ 8.5 BLUE

## (undated) DEVICE — SILVER-COATED ANTIMICROBIAL BARRIER DRESSING: Brand: ACTICOAT   4" X 8"

## (undated) DEVICE — COMFORT HOOD -75 EA/BX: Brand: CARDINAL HEALTH

## (undated) DEVICE — SPONGE PVP SCRUB WING STERILE

## (undated) DEVICE — HEAVY DUTY TABLE COVER: Brand: CONVERTORS

## (undated) DEVICE — 2.5MM DRILL TIP GUIDE WIRE 200MM

## (undated) DEVICE — SUT VICRYL PLUS 1 CTB-1 36 IN VCPB947H